# Patient Record
Sex: FEMALE | Race: WHITE | NOT HISPANIC OR LATINO
[De-identification: names, ages, dates, MRNs, and addresses within clinical notes are randomized per-mention and may not be internally consistent; named-entity substitution may affect disease eponyms.]

---

## 2018-12-17 ENCOUNTER — APPOINTMENT (OUTPATIENT)
Dept: ORTHOPEDIC SURGERY | Facility: CLINIC | Age: 68
End: 2018-12-17
Payer: COMMERCIAL

## 2018-12-17 VITALS
WEIGHT: 110 LBS | BODY MASS INDEX: 17.68 KG/M2 | HEIGHT: 66 IN | DIASTOLIC BLOOD PRESSURE: 100 MMHG | SYSTOLIC BLOOD PRESSURE: 178 MMHG | HEART RATE: 79 BPM

## 2018-12-17 DIAGNOSIS — M25.551 PAIN IN RIGHT HIP: ICD-10-CM

## 2018-12-17 DIAGNOSIS — S70.12XA CONTUSION OF LEFT HIP, INITIAL ENCOUNTER: ICD-10-CM

## 2018-12-17 DIAGNOSIS — S70.02XA CONTUSION OF LEFT HIP, INITIAL ENCOUNTER: ICD-10-CM

## 2018-12-17 PROBLEM — Z00.00 ENCOUNTER FOR PREVENTIVE HEALTH EXAMINATION: Noted: 2018-12-17

## 2018-12-17 PROBLEM — Z00.00 ENCOUNTER FOR PREVENTIVE HEALTH EXAMINATION: Status: ACTIVE | Noted: 2018-12-17

## 2018-12-17 PROCEDURE — 73502 X-RAY EXAM HIP UNI 2-3 VIEWS: CPT | Mod: LT

## 2018-12-17 PROCEDURE — 99204 OFFICE O/P NEW MOD 45 MIN: CPT

## 2018-12-18 ENCOUNTER — APPOINTMENT (OUTPATIENT)
Dept: ORTHOPEDIC SURGERY | Facility: CLINIC | Age: 68
End: 2018-12-18

## 2019-05-28 ENCOUNTER — RESULT REVIEW (OUTPATIENT)
Age: 69
End: 2019-05-28

## 2019-10-04 ENCOUNTER — NON-APPOINTMENT (OUTPATIENT)
Age: 69
End: 2019-10-04

## 2019-10-04 ENCOUNTER — APPOINTMENT (OUTPATIENT)
Dept: CARDIOLOGY | Facility: CLINIC | Age: 69
End: 2019-10-04
Payer: COMMERCIAL

## 2019-10-04 VITALS
BODY MASS INDEX: 17.04 KG/M2 | DIASTOLIC BLOOD PRESSURE: 93 MMHG | HEIGHT: 66 IN | SYSTOLIC BLOOD PRESSURE: 154 MMHG | WEIGHT: 106 LBS | RESPIRATION RATE: 15 BRPM | HEART RATE: 68 BPM

## 2019-10-04 PROCEDURE — 99204 OFFICE O/P NEW MOD 45 MIN: CPT

## 2019-10-04 PROCEDURE — 93000 ELECTROCARDIOGRAM COMPLETE: CPT

## 2019-10-11 ENCOUNTER — APPOINTMENT (OUTPATIENT)
Dept: CARDIOLOGY | Facility: CLINIC | Age: 69
End: 2019-10-11
Payer: COMMERCIAL

## 2019-10-11 VITALS
WEIGHT: 108 LBS | HEART RATE: 74 BPM | BODY MASS INDEX: 17.36 KG/M2 | HEIGHT: 66 IN | RESPIRATION RATE: 15 BRPM | SYSTOLIC BLOOD PRESSURE: 150 MMHG | DIASTOLIC BLOOD PRESSURE: 91 MMHG

## 2019-10-11 PROCEDURE — 99214 OFFICE O/P EST MOD 30 MIN: CPT

## 2019-10-11 NOTE — PHYSICAL EXAM
[Well Groomed] : well groomed [Normal Appearance] : normal appearance [General Appearance - Well Developed] : well developed [General Appearance - In No Acute Distress] : no acute distress [General Appearance - Well Nourished] : well nourished [No Deformities] : no deformities [Normal Oropharynx] : normal oropharynx [Normal Conjunctiva] : the conjunctiva exhibited no abnormalities [Normal Oral Mucosa] : normal oral mucosa [Normal Jugular Venous V Waves Present] : normal jugular venous V waves present [Normal Jugular Venous A Waves Present] : normal jugular venous A waves present [Respiration, Rhythm And Depth] : normal respiratory rhythm and effort [No Jugular Venous Fish A Waves] : no jugular venous fish A waves [Exaggerated Use Of Accessory Muscles For Inspiration] : no accessory muscle use [Auscultation Breath Sounds / Voice Sounds] : lungs were clear to auscultation bilaterally [Bowel Sounds] : normal bowel sounds [Abdomen Soft] : soft [Abnormal Walk] : normal gait [Gait - Sufficient For Exercise Testing] : the gait was sufficient for exercise testing [Cyanosis, Localized] : no localized cyanosis [Nail Clubbing] : no clubbing of the fingernails [Skin Color & Pigmentation] : normal skin color and pigmentation [Skin Turgor] : normal skin turgor [] : no rash [Oriented To Time, Place, And Person] : oriented to person, place, and time [Affect] : the affect was normal [Mood] : the mood was normal [No Anxiety] : not feeling anxious [Normal] : normal [No Precordial Heave] : no precordial heave was noted [Normal Rate] : normal [Rhythm Regular] : regular [Normal S1] : normal S1 [Normal S2] : normal S2 [No Gallop] : no gallop heard [II] : a grade 2 [No Abnormalities] : the abdominal aorta was not enlarged and no bruit was heard [2+] : left 2+ [No Pitting Edema] : no pitting edema present [S3] : no S3 [S4] : no S4 [Click] : no click [Pericardial Rub] : no pericardial rub [Right Carotid Bruit] : no bruit heard over the right carotid [Left Carotid Bruit] : no bruit heard over the left carotid [Right Femoral Bruit] : no bruit heard over the right femoral artery [Left Femoral Bruit] : no bruit heard over the left femoral artery

## 2019-10-11 NOTE — REASON FOR VISIT
[Follow-Up - Clinic] : a clinic follow-up of [FreeTextEntry2] : pre-op cataract [FreeTextEntry1] : murmur

## 2019-10-22 RX ORDER — TELMISARTAN 80 MG/1
80 TABLET ORAL
Qty: 90 | Refills: 1 | Status: DISCONTINUED | COMMUNITY
Start: 2019-10-11 | End: 2019-10-22

## 2019-11-12 ENCOUNTER — APPOINTMENT (OUTPATIENT)
Dept: CARDIOLOGY | Facility: CLINIC | Age: 69
End: 2019-11-12
Payer: COMMERCIAL

## 2019-11-12 VITALS
WEIGHT: 109 LBS | SYSTOLIC BLOOD PRESSURE: 150 MMHG | DIASTOLIC BLOOD PRESSURE: 89 MMHG | BODY MASS INDEX: 17.52 KG/M2 | RESPIRATION RATE: 16 BRPM | HEIGHT: 66 IN | HEART RATE: 76 BPM

## 2019-11-12 PROCEDURE — 99213 OFFICE O/P EST LOW 20 MIN: CPT

## 2019-11-12 RX ORDER — TROPICAMIDE 10 MG/ML
1 SOLUTION/ DROPS OPHTHALMIC
Qty: 15 | Refills: 0 | Status: DISCONTINUED | COMMUNITY
Start: 2019-10-08

## 2019-11-12 RX ORDER — DIFLUPREDNATE 0.5 MG/ML
0.05 EMULSION OPHTHALMIC
Qty: 5 | Refills: 0 | Status: DISCONTINUED | COMMUNITY
Start: 2019-10-08

## 2019-11-12 RX ORDER — TELMISARTAN 40 MG/1
40 TABLET ORAL DAILY
Qty: 90 | Refills: 1 | Status: COMPLETED | COMMUNITY
Start: 2019-10-04 | End: 2019-11-12

## 2019-11-12 RX ORDER — OFLOXACIN 3 MG/ML
0.3 SOLUTION/ DROPS OPHTHALMIC
Qty: 5 | Refills: 0 | Status: DISCONTINUED | COMMUNITY
Start: 2019-10-08

## 2019-11-26 ENCOUNTER — APPOINTMENT (OUTPATIENT)
Dept: CARDIOLOGY | Facility: CLINIC | Age: 69
End: 2019-11-26
Payer: COMMERCIAL

## 2019-11-26 VITALS
WEIGHT: 107 LBS | RESPIRATION RATE: 99 BRPM | HEIGHT: 66 IN | SYSTOLIC BLOOD PRESSURE: 130 MMHG | DIASTOLIC BLOOD PRESSURE: 82 MMHG | HEART RATE: 74 BPM | BODY MASS INDEX: 17.19 KG/M2

## 2019-11-26 PROCEDURE — 99213 OFFICE O/P EST LOW 20 MIN: CPT

## 2019-12-03 RX ORDER — BROMFENAC 0.76 MG/ML
0.07 SOLUTION/ DROPS OPHTHALMIC
Qty: 5 | Refills: 0 | Status: COMPLETED | COMMUNITY
Start: 2019-10-08 | End: 2019-12-03

## 2019-12-03 RX ORDER — TIMOLOL MALEATE 5 MG/ML
0.5 SOLUTION OPHTHALMIC
Qty: 5 | Refills: 0 | Status: COMPLETED | COMMUNITY
Start: 2019-10-24 | End: 2019-12-03

## 2020-01-08 ENCOUNTER — APPOINTMENT (OUTPATIENT)
Dept: CARDIOLOGY | Facility: CLINIC | Age: 70
End: 2020-01-08
Payer: COMMERCIAL

## 2020-01-08 ENCOUNTER — LABORATORY RESULT (OUTPATIENT)
Age: 70
End: 2020-01-08

## 2020-01-08 VITALS
HEIGHT: 66 IN | BODY MASS INDEX: 17.36 KG/M2 | RESPIRATION RATE: 16 BRPM | HEART RATE: 70 BPM | WEIGHT: 108 LBS | DIASTOLIC BLOOD PRESSURE: 84 MMHG | SYSTOLIC BLOOD PRESSURE: 130 MMHG

## 2020-01-08 DIAGNOSIS — Z01.810 ENCOUNTER FOR PREPROCEDURAL CARDIOVASCULAR EXAMINATION: ICD-10-CM

## 2020-01-08 DIAGNOSIS — Z87.891 PERSONAL HISTORY OF NICOTINE DEPENDENCE: ICD-10-CM

## 2020-01-08 PROCEDURE — 99214 OFFICE O/P EST MOD 30 MIN: CPT

## 2020-04-26 ENCOUNTER — MESSAGE (OUTPATIENT)
Age: 70
End: 2020-04-26

## 2020-05-06 ENCOUNTER — APPOINTMENT (OUTPATIENT)
Dept: DISASTER EMERGENCY | Facility: CLINIC | Age: 70
End: 2020-05-06

## 2020-05-06 LAB
SARS-COV-2 IGG SERPL IA-ACNC: 0.4 INDEX
SARS-COV-2 IGG SERPL QL IA: NEGATIVE

## 2020-08-12 RX ORDER — TELMISARTAN AND HYDROCHLOROTHIAZIDE 40; 12.5 MG/1; MG/1
40-12.5 TABLET ORAL
Qty: 90 | Refills: 1 | Status: DISCONTINUED | COMMUNITY
Start: 2020-03-13 | End: 2020-08-12

## 2020-09-14 ENCOUNTER — RX RENEWAL (OUTPATIENT)
Age: 70
End: 2020-09-14

## 2020-09-23 ENCOUNTER — APPOINTMENT (OUTPATIENT)
Dept: CARDIOLOGY | Facility: CLINIC | Age: 70
End: 2020-09-23
Payer: COMMERCIAL

## 2020-09-23 ENCOUNTER — NON-APPOINTMENT (OUTPATIENT)
Age: 70
End: 2020-09-23

## 2020-09-23 VITALS
WEIGHT: 107 LBS | RESPIRATION RATE: 15 BRPM | HEART RATE: 69 BPM | BODY MASS INDEX: 17.19 KG/M2 | SYSTOLIC BLOOD PRESSURE: 128 MMHG | HEIGHT: 66 IN | DIASTOLIC BLOOD PRESSURE: 80 MMHG

## 2020-09-23 PROCEDURE — 99213 OFFICE O/P EST LOW 20 MIN: CPT

## 2020-09-23 PROCEDURE — 93000 ELECTROCARDIOGRAM COMPLETE: CPT

## 2020-10-22 ENCOUNTER — LABORATORY RESULT (OUTPATIENT)
Age: 70
End: 2020-10-22

## 2020-10-27 ENCOUNTER — APPOINTMENT (OUTPATIENT)
Dept: CARDIOLOGY | Facility: CLINIC | Age: 70
End: 2020-10-27
Payer: COMMERCIAL

## 2020-10-27 PROCEDURE — 99072 ADDL SUPL MATRL&STAF TM PHE: CPT

## 2020-10-27 PROCEDURE — 78452 HT MUSCLE IMAGE SPECT MULT: CPT

## 2020-10-27 PROCEDURE — A9500: CPT

## 2020-10-27 PROCEDURE — 93015 CV STRESS TEST SUPVJ I&R: CPT

## 2021-04-16 ENCOUNTER — NON-APPOINTMENT (OUTPATIENT)
Age: 71
End: 2021-04-16

## 2021-05-26 ENCOUNTER — APPOINTMENT (OUTPATIENT)
Dept: CARDIOLOGY | Facility: CLINIC | Age: 71
End: 2021-05-26

## 2021-08-18 ENCOUNTER — APPOINTMENT (OUTPATIENT)
Dept: CARDIOLOGY | Facility: CLINIC | Age: 71
End: 2021-08-18
Payer: COMMERCIAL

## 2021-08-18 ENCOUNTER — NON-APPOINTMENT (OUTPATIENT)
Age: 71
End: 2021-08-18

## 2021-08-18 VITALS
TEMPERATURE: 97.8 F | HEIGHT: 66 IN | DIASTOLIC BLOOD PRESSURE: 90 MMHG | SYSTOLIC BLOOD PRESSURE: 160 MMHG | RESPIRATION RATE: 16 BRPM | WEIGHT: 109 LBS | BODY MASS INDEX: 17.52 KG/M2 | OXYGEN SATURATION: 99 % | HEART RATE: 64 BPM

## 2021-08-18 PROCEDURE — 99214 OFFICE O/P EST MOD 30 MIN: CPT | Mod: 25

## 2021-08-18 PROCEDURE — 93000 ELECTROCARDIOGRAM COMPLETE: CPT

## 2021-08-18 NOTE — CARDIOLOGY SUMMARY
[___] : [unfilled] [de-identified] : 8/18/2021 [de-identified] : NUC: 10/27/2020 [de-identified] : 9/25/2019

## 2021-08-18 NOTE — PHYSICAL EXAM
[Well Developed] : well developed [Well Nourished] : well nourished [No Acute Distress] : no acute distress [Normal Venous Pressure] : normal venous pressure [No Carotid Bruit] : no carotid bruit [Normal S1, S2] : normal S1, S2 [No Murmur] : no murmur [No Rub] : no rub [Clear Lung Fields] : clear lung fields [Good Air Entry] : good air entry [No Respiratory Distress] : no respiratory distress  [Soft] : abdomen soft [Non Tender] : non-tender [No Masses/organomegaly] : no masses/organomegaly [Normal Bowel Sounds] : normal bowel sounds [Normal Gait] : normal gait [No Edema] : no edema [No Cyanosis] : no cyanosis [No Clubbing] : no clubbing [No Varicosities] : no varicosities [No Rash] : no rash [No Skin Lesions] : no skin lesions [Moves all extremities] : moves all extremities [No Focal Deficits] : no focal deficits [Normal Speech] : normal speech [Alert and Oriented] : alert and oriented [Normal memory] : normal memory [General Appearance - Well Developed] : well developed [Normal Appearance] : normal appearance [Well Groomed] : well groomed [General Appearance - Well Nourished] : well nourished [No Deformities] : no deformities [General Appearance - In No Acute Distress] : no acute distress [Normal Conjunctiva] : the conjunctiva exhibited no abnormalities [Normal Oral Mucosa] : normal oral mucosa [Normal Oropharynx] : normal oropharynx [Normal Jugular Venous A Waves Present] : normal jugular venous A waves present [Normal Jugular Venous V Waves Present] : normal jugular venous V waves present [No Jugular Venous Fish A Waves] : no jugular venous fish A waves [Respiration, Rhythm And Depth] : normal respiratory rhythm and effort [Exaggerated Use Of Accessory Muscles For Inspiration] : no accessory muscle use [Auscultation Breath Sounds / Voice Sounds] : lungs were clear to auscultation bilaterally [Bowel Sounds] : normal bowel sounds [Abdomen Soft] : soft [Abnormal Walk] : normal gait [Gait - Sufficient For Exercise Testing] : the gait was sufficient for exercise testing [Nail Clubbing] : no clubbing of the fingernails [Cyanosis, Localized] : no localized cyanosis [Skin Color & Pigmentation] : normal skin color and pigmentation [Skin Turgor] : normal skin turgor [] : no rash [Oriented To Time, Place, And Person] : oriented to person, place, and time [Affect] : the affect was normal [Mood] : the mood was normal [No Anxiety] : not feeling anxious [5th Left ICS - MCL] : palpated at the 5th LICS in the midclavicular line [Normal] : normal [No Precordial Heave] : no precordial heave was noted [Normal Rate] : normal [Heart Rate ___] : [unfilled] bpm [Rhythm Regular] : regular [Normal S1] : normal S1 [Normal S2] : normal S2 [No Gallop] : no gallop heard [II] : a grade 2 [2+] : left 2+ [No Abnormalities] : the abdominal aorta was not enlarged and no bruit was heard [No Pitting Edema] : no pitting edema present [S3] : no S3 [S4] : no S4 [Click] : no click [Pericardial Rub] : no pericardial rub [Right Carotid Bruit] : no bruit heard over the right carotid [Left Carotid Bruit] : no bruit heard over the left carotid [Right Femoral Bruit] : no bruit heard over the right femoral artery [Left Femoral Bruit] : no bruit heard over the left femoral artery

## 2021-08-18 NOTE — DISCUSSION/SUMMARY
[FreeTextEntry1] : Dr. Be-(PRIOR VISIT and PMH WITH Dr. Be): \par This is a 70-year-old female past medical history significant for Mohs surgery, s/p cataract surgery who comes in for follow-up cardiac evaluation.  She denies chest pain, shortness of breath, dizziness or syncope.  The patient is still concerned about her abnormal electrocardiogram.\par \par Electrocardiogram done September 23, 2020 demonstrated normal sinus rhythm rate 70 bpm is otherwise remarkable for left atrial abnormality and a Q wave in V1 and V2 with poor R wave progression.\par \par The patient will schedule a nuclear stress test to rule out significant coronary artery disease and to evaluate her coronary artery perfusion.\par \par Her blood pressure is under good control.\par \par The patient has no history of rheumatic fever.  Her cardiac risk factors include a remote history of smoking (he stopped over 35 years ago, and apparently now hypertension.\par \par Electrocardiogram done October 4, 2019 demonstrated normal sinus rhythm rate 67 beats per minute is otherwise remarkable for poor R-wave progression.\par \par  She exercises on a daily basis and has good aerobic capacity.\par \par She will follow-up with me after the nuclear stress test is completed.\par She is encouraged to limit her salt intake.  She will continue on her good aerobic exercise program.

## 2021-08-19 ENCOUNTER — APPOINTMENT (OUTPATIENT)
Dept: CARDIOLOGY | Facility: CLINIC | Age: 71
End: 2021-08-19
Payer: COMMERCIAL

## 2021-08-19 DIAGNOSIS — I10 ESSENTIAL (PRIMARY) HYPERTENSION: ICD-10-CM

## 2021-08-19 PROCEDURE — 93784 AMBL BP MNTR W/SOFTWARE: CPT

## 2021-08-20 PROBLEM — I10 WHITE COAT SYNDROME WITH DIAGNOSIS OF HYPERTENSION: Status: ACTIVE | Noted: 2021-08-18

## 2021-08-23 ENCOUNTER — NON-APPOINTMENT (OUTPATIENT)
Age: 71
End: 2021-08-23

## 2021-12-14 ENCOUNTER — APPOINTMENT (OUTPATIENT)
Dept: CARDIOLOGY | Facility: CLINIC | Age: 71
End: 2021-12-14

## 2022-01-03 ENCOUNTER — TRANSCRIPTION ENCOUNTER (OUTPATIENT)
Age: 72
End: 2022-01-03

## 2022-01-04 ENCOUNTER — EMERGENCY (EMERGENCY)
Facility: HOSPITAL | Age: 72
LOS: 1 days | Discharge: HOME | End: 2022-01-04
Attending: STUDENT IN AN ORGANIZED HEALTH CARE EDUCATION/TRAINING PROGRAM
Payer: COMMERCIAL

## 2022-01-04 VITALS
RESPIRATION RATE: 18 BRPM | WEIGHT: 108.91 LBS | TEMPERATURE: 98 F | DIASTOLIC BLOOD PRESSURE: 106 MMHG | HEIGHT: 66 IN | OXYGEN SATURATION: 98 % | HEART RATE: 114 BPM | SYSTOLIC BLOOD PRESSURE: 192 MMHG

## 2022-01-04 PROCEDURE — 99284 EMERGENCY DEPT VISIT MOD MDM: CPT

## 2022-01-04 PROCEDURE — 70450 CT HEAD/BRAIN W/O DYE: CPT | Mod: 26,MD

## 2022-01-04 PROCEDURE — 90714 TD VACC NO PRESV 7 YRS+ IM: CPT

## 2022-01-04 PROCEDURE — 76377 3D RENDER W/INTRP POSTPROCES: CPT | Mod: 26

## 2022-01-04 PROCEDURE — 99285 EMERGENCY DEPT VISIT HI MDM: CPT | Mod: 25

## 2022-01-04 PROCEDURE — 70486 CT MAXILLOFACIAL W/O DYE: CPT | Mod: MD

## 2022-01-04 PROCEDURE — 70450 CT HEAD/BRAIN W/O DYE: CPT | Mod: MD

## 2022-01-04 PROCEDURE — 76377 3D RENDER W/INTRP POSTPROCES: CPT

## 2022-01-04 PROCEDURE — 70486 CT MAXILLOFACIAL W/O DYE: CPT | Mod: 26,MD

## 2022-01-04 PROCEDURE — 90471 IMMUNIZATION ADMIN: CPT

## 2022-01-04 PROCEDURE — 13132 CMPLX RPR F/C/C/M/N/AX/G/H/F: CPT

## 2022-01-04 RX ORDER — TETANUS AND DIPHTHERIA TOXOIDS ADSORBED 2; 2 [LF]/.5ML; [LF]/.5ML
0.5 INJECTION INTRAMUSCULAR ONCE
Refills: 0 | Status: COMPLETED | OUTPATIENT
Start: 2022-01-04 | End: 2022-01-04

## 2022-01-04 RX ORDER — ACETAMINOPHEN 500 MG
975 TABLET ORAL ONCE
Refills: 0 | Status: COMPLETED | OUTPATIENT
Start: 2022-01-04 | End: 2022-01-04

## 2022-01-04 RX ORDER — CEPHALEXIN 500 MG
1 CAPSULE ORAL
Qty: 8 | Refills: 0
Start: 2022-01-04 | End: 2022-01-07

## 2022-01-04 RX ADMIN — Medication 975 MILLIGRAM(S): at 11:15

## 2022-01-04 RX ADMIN — Medication 975 MILLIGRAM(S): at 12:10

## 2022-01-04 RX ADMIN — TETANUS AND DIPHTHERIA TOXOIDS ADSORBED 0.5 MILLILITER(S): 2; 2 INJECTION INTRAMUSCULAR at 09:45

## 2022-01-04 NOTE — ED PROVIDER NOTE - ATTENDING CONTRIBUTION TO CARE
I, Guillermo Mcclendon, performed a history and physical exam of the patient and discussed their management with the resident and/or advanced care provider. I reviewed the resident and/or advanced care provider's note and agree with the documented findings and plan of care. I was present and available for all procedures.    71F PMH of HTN here after bumping into steel frame at 3AM this morning. Mechanical mechanism, no prodromal sequelae, patient denies falling from impact but says that she sank to floor 2/2 ?pain. Denies postdromal sequelae as well. Denies HA, n/v/ blurry vision, double vision, changes in sensation, dizziness. Did not take any pain meds. Denies other recent trauma, fevers, chills, headache, dizziness, nausea, vomiting, dysuria, freq, hematuria, diarrhea, constipation, chest pain, shortness of breath, cough.      Primary Survey  A - airway intact  B - bilateral breath sounds and good chest rise  C - initially BP: 192/106 , palpable pulses in all extremities  D - GCS 15 on arrival  Exposure obtained    Secondary survey  Gen: NAD  HEENT: NC, C-spine no ttp, right forehead with laceration 3cm, pupils 3mm equal & reactive, eomi, nasal ttp with swelling no nasal septal hematoma, left maxillary ttp without crepitus, periorbital ecchymosis to left eye. no ttp, normal teeth. small upper lip laceration not through and through not crossing vermillion border, left neck ecchymosis without ttp or crepitus trachea midline   CV: pulse regularly present  Pulm: CTA B/L  Chest: intact without ttp  Abd: Soft, ND, NT, no rebound, no guarding  Groin: Normal appearing  Ext: Palp radial b/l, palp DP b/l  Back: no TTP, no palpable runoff, stepoff, or deformity    well appearing concerning for laceration after hs, patient requesting plastics for laceration repair otherwise will eval with cth ct max face, tdap, analgesia, no other signs of traumatic injury or orbital entrapment dispo pending reeval

## 2022-01-04 NOTE — ED PROVIDER NOTE - CARE PROVIDER_API CALL
Calixto Angel)  Plastic Surgery  [address]  [place] [address]  Phone: [phone]  Fax: [phone]  Follow Up Time: 1-3 Days

## 2022-01-04 NOTE — ED PROVIDER NOTE - PHYSICAL EXAMINATION
Gen: WDWN, NAD  HEENT: EOMI, no nasal discharge, mucous membranes moist; (+) R forehead laceration ~ 2-3cm in length from hairline to upper mid forehead vertically; (+) L lip lac < 1cm in length not open, (+) forehead hematoma in b/w eyebrows, (+) raccoon sign on L, no hemotympanum or coughlin sign, no CSF rhinorrhea  CV: RRR, +S1/S2, no M/R/G  Resp: CTAB, no W/R/R  GI: Abdomen soft non-distended, NTTP, no masses  MSK: No open wounds, no bruising, no LE edema  Neuro: CN2-12 grossly intact, A&Ox4, MS +5/5 in UE and LE BL, finger to nose smooth and rapid, gross sensation intact in UE and LE BL, gait smooth and coordinated, negative rhomberg, negative pronator drift   Psych: appropriate mood, denies AH, VH, SI

## 2022-01-04 NOTE — ED PROVIDER NOTE - OBJECTIVE STATEMENT
71F PMH of HTN here after bumping into steel frame at 3AM this morning. Mechicanical mechanism, no prodromal sequelae, patient denies falling from impact but says that she sank to floor 2/2 ?pain. Denies postdromal sequelae as well. Denies HA, n/v/ blurry vision, double vision, changes in sensation, dizziness. Did not take any pain meds.

## 2022-01-04 NOTE — ED ADULT NURSE NOTE - NSIMPLEMENTINTERV_GEN_ALL_ED
Abdomen soft, non-tender, no guarding.
Implemented All Universal Safety Interventions:  [place] to call system. Call bell, personal items and telephone within reach. Instruct patient to call for assistance. Room bathroom lighting operational. Non-slip footwear when patient is off stretcher. Physically safe environment: no spills, clutter or unnecessary equipment. Stretcher in lowest position, wheels locked, appropriate side rails in place.

## 2022-01-04 NOTE — ED PROVIDER NOTE - PROGRESS NOTE DETAILS
Patient requesting plastics to attend to wound. Will call plastics for assistance Sheba AYALA PGY2: Will dc on keflex BID x 4 days as per plastics recs

## 2022-01-04 NOTE — ED ADULT NURSE NOTE - OBJECTIVE STATEMENT
Patient presents s/p fall at home. As per patient, she woke up around 3AM to use the restroom and walked into a metal bar at home cause a laceration to the R side of her forehead/hairline. Patient presents with bruising to the L eye/orbital area, some facial abrasions, and bilateral knee bruising. Patient states discomfort is localized to face. Denies any other injuries, lumbar, cervical or chest pain. PMH: HTN on Losartan daily. Safety parameters in place. Will continue to assess and monitor.

## 2022-01-04 NOTE — ED PROVIDER NOTE - PATIENT PORTAL LINK FT
You can access the FollowMyHealth Patient Portal offered by [facility] by registering at the following website: [email or web address]. By joining [facility]’s FollowMyHealth portal, you will also be able to view your health information using other applications (apps) compatible with our system.

## 2022-01-04 NOTE — ED PROVIDER NOTE - CLINICAL SUMMARY MEDICAL DECISION MAKING FREE TEXT BOX
71F here after head lac. VSS, PE significant for head lac 2-3cm in length, raccoon sign on L, no hemotypanum or CSF rhinorrhe. Will do CT and pain control, plastics consult.

## 2022-01-04 NOTE — ED PROVIDER NOTE - NSFOLLOWUPINSTRUCTIONS_ED_ALL_ED_FT
You were seen today for lacerations that required repair by our plastic surgeons. Please follow up in 2 days at Dr. Angel's office. We have sent antibiotics to your pharmacy. Please take as directed.    Testing done today indicates that your issue is not from an acute emergency, this could still represent a more serious problem. Most commonly, what you are experiencing is likely not something serious and will resolve in a few days, however testing was done today based on the symptoms that you currently have; so if you develop new or worsening symptoms this could be a sign of a problem that was not tested for and means you should come back to the emergency room or see your doctor urgently. You need to follow up with your doctor in the next 48-72 hours. If you develop any new or worsening symptoms you need to return immediately to the emergency department. If you experience any of the following please come right back to the emergency room: severe nausea and vomiting with inability to tolerate eating, severe worsening of your pain, a new fever, new bleeding in stool or vomit, confusion, new numbness or weakness, passing out.

## 2022-01-04 NOTE — ED ADULT NURSE NOTE - CHIEF COMPLAINT QUOTE
Refill requested for:     Furosemide 40 mg   Last filled on:     8/30/17 #90 +3  Last office visit:     7/12/18   Last labs: 7/12/18    Please advise on refills.   
laceration to forehead   denies loc no thinners

## 2022-02-21 ENCOUNTER — APPOINTMENT (OUTPATIENT)
Dept: CARDIOLOGY | Facility: CLINIC | Age: 72
End: 2022-02-21

## 2022-02-21 PROBLEM — I10 ESSENTIAL (PRIMARY) HYPERTENSION: Chronic | Status: ACTIVE | Noted: 2022-01-04

## 2022-04-11 ENCOUNTER — APPOINTMENT (OUTPATIENT)
Dept: CARDIOLOGY | Facility: CLINIC | Age: 72
End: 2022-04-11
Payer: COMMERCIAL

## 2022-04-11 ENCOUNTER — NON-APPOINTMENT (OUTPATIENT)
Age: 72
End: 2022-04-11

## 2022-04-11 VITALS
OXYGEN SATURATION: 99 % | DIASTOLIC BLOOD PRESSURE: 98 MMHG | TEMPERATURE: 97.8 F | WEIGHT: 108 LBS | HEART RATE: 75 BPM | RESPIRATION RATE: 16 BRPM | BODY MASS INDEX: 17.36 KG/M2 | SYSTOLIC BLOOD PRESSURE: 165 MMHG | HEIGHT: 66 IN

## 2022-04-11 PROCEDURE — 93000 ELECTROCARDIOGRAM COMPLETE: CPT

## 2022-04-11 PROCEDURE — 93306 TTE W/DOPPLER COMPLETE: CPT

## 2022-04-11 PROCEDURE — 99214 OFFICE O/P EST MOD 30 MIN: CPT | Mod: 25

## 2022-04-11 NOTE — ASSESSMENT
[FreeTextEntry1] : This is a 71 year year old female here today for follow up cardiac followup evaluation. \par She has a past medical history significant for Mohs surgery, s/p cataract surgery.\par \par Today she is feeling generally well and does not have any complaints at this time. She remains on Telmisartan HCTZ 40/12.5mg PO DAILY. She was supposed to be on Amlodipine 5mg PO DAILY for BP management in addition to her Telmisartan HCTZ from when she had her 24 hour BP cuff done on 8/19/21 which demonstrated stage 1 HTN. She states that she did not like the side effects of the Amlodipine 5mg PO DAILY and stopped it herself. She states if her BP is elevated today then she is willing to rechallenge herself with the increased dose of Telmisartan (she did not like the side effects of the 80mg dose in the past).\par \par -The patient has no history of rheumatic fever.  Her cardiac risk factors include a remote history of smoking (he stopped over 35 years ago, and apparently now hypertension.\par \par BLOOD PRESSURE:\par -BP is elevated in today's visit and patient is on Telmisartan HCTZ 40/12.5mg PO DAILY.\par -We will increase her medication to Telmisartan HCTZ 80/12.5mg PO DAILY and she will follow up in 1 month for BP check.\par \par -I have discussed the importance of maintaining good BP control and reviewed the newest guidelines with the patient while re-enforcing dietary sodium restrictions to no more than 2-3 g daily, DASH diet, life style modifications as well as the goal of maintaining ideal body weight with the patient today. I have advised the patient to avoid the use of over-the-counter medications/ supplements especially NSAIDS.\par \par I have reviewed with Ms. PELAYO that serious health consequences can occur when blood pressure is not well controlled and the need for strict compliance with medication and that optimal control can significantly reduce the risk of cardiovascular disease stroke, heart attack and other organ damage. They verbally expressed understanding of the fore mentioned serious health consequences to me today.\par \par BLOOD WORK:\par -New blood work script was given today, 4/11/22 to evaluate lipid profile, CBC, BMP, hepatic function, A1C and TSH.\par \par CHOLESTEROL CONTROL:\par -Patient will continue the advised  TLC diet and to continue follow-up for treatment of hyperlipidemia and repeat blood testing with diet and exercise. I have discussed different exercises and the importance of maintenance of optimal body weight. The importance of staying within guidelines and recommendations was stressed to the patient today and they acknowledged that they understand this to me verbally.\par \par  -Ms. PELAYO was educated and advised that failure to follow-up with my medical recommendations to lower cholesterol can result in severe health consequences therefore, they will continuing a low saturated and low fat diet and to avoid excessive carbohydrates to help reduce triglycerides and that lowering LDL levels is associated with a significant decrease in serious cardiac events including but not limited to heart attack stroke and overall death. We will continue lipid lowering agents as advised based on blood test results and the patient understands to call if they develop severe muscle discomfort or if they have a reddish tinted discoloration in their urine.\par \par TESTING/REPORTS:\par -EKG done today 04/11/2022 which demonstrated regular sinus rhythm  BPM of 75 otherwise remarkable for left atrial abnormality and a Q wave in V1 and V2 with poor R wave progression.\par \par -EKG done Aug 18, 2021 which demonstrated regular sinus rhythm, BPM 66 of otherwise remarkable for left atrial abnormality and a Q wave in V1 and V2 with poor R wave progression.\par \par Electrocardiogram done September 23, 2020 demonstrated normal sinus rhythm rate 70 bpm is otherwise remarkable for left atrial abnormality and a Q wave in V1 and V2 with poor R wave progression.\par \par -Echocardiogram done in the office 4/11/22 and results are pending. \par \par -Nuclear stress test done on 10/27/2020 demonstrated no evidence of infarction or inducible ischemia .\par \par PLAN:\par -Increase Telmisartan HCTZ 80/12.5mg PO DAILY. \par -Follow up in 1 month for BP check.\par  \par I have discussed the plan of care with Ms. MIGUELITO PELAYO. She is compliant with all of her medications.\par \par The patient understands that aerobic exercises must be increased to minutes 4 times/week and a detailed discussion of lifestyle modification was done today. \par The patient has a good understanding of the diagnosis, treatment plan and lifestyle modification. \par She will contact me at the office for any questions with their care or any changes in their health status.\par \par The patient was seen together with supervision physician Dr. Sujit Be and the plan of care was discussed with the patient and will be carried out as noted above.\par \par Faith PARNELL

## 2022-04-11 NOTE — CARDIOLOGY SUMMARY
[___] : [unfilled] [de-identified] : 4/11/22 [de-identified] : NUC: 10/27/2020 [de-identified] : 9/25/2019

## 2022-04-13 RX ORDER — AMLODIPINE BESYLATE 5 MG/1
5 TABLET ORAL
Qty: 90 | Refills: 1 | Status: DISCONTINUED | COMMUNITY
Start: 2021-08-23 | End: 2022-04-13

## 2022-04-28 ENCOUNTER — NON-APPOINTMENT (OUTPATIENT)
Age: 72
End: 2022-04-28

## 2022-05-24 ENCOUNTER — APPOINTMENT (OUTPATIENT)
Dept: CARDIOLOGY | Facility: CLINIC | Age: 72
End: 2022-05-24

## 2022-07-06 ENCOUNTER — APPOINTMENT (OUTPATIENT)
Dept: CARDIOLOGY | Facility: CLINIC | Age: 72
End: 2022-07-06

## 2022-07-29 ENCOUNTER — APPOINTMENT (OUTPATIENT)
Dept: CARDIOLOGY | Facility: CLINIC | Age: 72
End: 2022-07-29

## 2022-08-05 ENCOUNTER — APPOINTMENT (OUTPATIENT)
Dept: CARDIOLOGY | Facility: CLINIC | Age: 72
End: 2022-08-05

## 2022-08-05 VITALS
WEIGHT: 111 LBS | HEIGHT: 66 IN | HEART RATE: 73 BPM | DIASTOLIC BLOOD PRESSURE: 81 MMHG | BODY MASS INDEX: 17.84 KG/M2 | TEMPERATURE: 98 F | SYSTOLIC BLOOD PRESSURE: 134 MMHG | OXYGEN SATURATION: 99 % | RESPIRATION RATE: 14 BRPM

## 2022-08-05 PROCEDURE — 99214 OFFICE O/P EST MOD 30 MIN: CPT

## 2022-08-05 NOTE — PHYSICAL EXAM
[Well Developed] : well developed [Well Nourished] : well nourished [No Acute Distress] : no acute distress [Normal Venous Pressure] : normal venous pressure [No Carotid Bruit] : no carotid bruit [Normal S1, S2] : normal S1, S2 [No Murmur] : no murmur [No Rub] : no rub [Clear Lung Fields] : clear lung fields [Good Air Entry] : good air entry [No Respiratory Distress] : no respiratory distress  [Soft] : abdomen soft [Non Tender] : non-tender [No Masses/organomegaly] : no masses/organomegaly [Normal Bowel Sounds] : normal bowel sounds [Normal Gait] : normal gait [No Edema] : no edema [No Cyanosis] : no cyanosis [No Clubbing] : no clubbing [No Varicosities] : no varicosities [No Rash] : no rash [No Skin Lesions] : no skin lesions [Moves all extremities] : moves all extremities [No Focal Deficits] : no focal deficits [Normal Speech] : normal speech [Alert and Oriented] : alert and oriented [Normal memory] : normal memory [General Appearance - Well Developed] : well developed [Normal Appearance] : normal appearance [Well Groomed] : well groomed [General Appearance - Well Nourished] : well nourished [No Deformities] : no deformities [General Appearance - In No Acute Distress] : no acute distress [Normal Conjunctiva] : the conjunctiva exhibited no abnormalities [Normal Oral Mucosa] : normal oral mucosa [Normal Oropharynx] : normal oropharynx [Normal Jugular Venous A Waves Present] : normal jugular venous A waves present [Normal Jugular Venous V Waves Present] : normal jugular venous V waves present [No Jugular Venous Fish A Waves] : no jugular venous fish A waves [Respiration, Rhythm And Depth] : normal respiratory rhythm and effort [Exaggerated Use Of Accessory Muscles For Inspiration] : no accessory muscle use [Auscultation Breath Sounds / Voice Sounds] : lungs were clear to auscultation bilaterally [Bowel Sounds] : normal bowel sounds [Abdomen Soft] : soft [Abnormal Walk] : normal gait [Gait - Sufficient For Exercise Testing] : the gait was sufficient for exercise testing [Nail Clubbing] : no clubbing of the fingernails [Cyanosis, Localized] : no localized cyanosis [Skin Color & Pigmentation] : normal skin color and pigmentation [Skin Turgor] : normal skin turgor [] : no rash [Oriented To Time, Place, And Person] : oriented to person, place, and time [Affect] : the affect was normal [Mood] : the mood was normal [No Anxiety] : not feeling anxious [5th Left ICS - MCL] : palpated at the 5th LICS in the midclavicular line [Normal] : normal [No Precordial Heave] : no precordial heave was noted [Normal Rate] : normal [Heart Rate ___] : [unfilled] bpm [Rhythm Regular] : regular [Normal S1] : normal S1 [Normal S2] : normal S2 [No Gallop] : no gallop heard [S3] : no S3 [S4] : no S4 [Click] : no click [Pericardial Rub] : no pericardial rub [II] : a grade 2 [Right Carotid Bruit] : no bruit heard over the right carotid [Left Carotid Bruit] : no bruit heard over the left carotid [Right Femoral Bruit] : no bruit heard over the right femoral artery [Left Femoral Bruit] : no bruit heard over the left femoral artery [2+] : left 2+ [No Abnormalities] : the abdominal aorta was not enlarged and no bruit was heard [No Pitting Edema] : no pitting edema present

## 2022-08-05 NOTE — CARDIOLOGY SUMMARY
[de-identified] : 4/11/22 [de-identified] : NUC: 10/27/2020 [de-identified] : 9/25/2019 [___] : [unfilled]

## 2022-08-05 NOTE — ASSESSMENT
[FreeTextEntry1] : Prior note nurse practitioner Cherelle April 11, 2022::\par \par This is a 71 year year old female here today for follow up cardiac followup evaluation. \par She has a past medical history significant for Mohs surgery, s/p cataract surgery.\par \par Today she is feeling generally well and does not have any complaints at this time. She remains on Telmisartan HCTZ 40/12.5mg PO DAILY. She was supposed to be on Amlodipine 5mg PO DAILY for BP management in addition to her Telmisartan HCTZ from when she had her 24 hour BP cuff done on 8/19/21 which demonstrated stage 1 HTN. She states that she did not like the side effects of the Amlodipine 5mg PO DAILY and stopped it herself. She states if her BP is elevated today then she is willing to rechallenge herself with the increased dose of Telmisartan (she did not like the side effects of the 80mg dose in the past).\par \par -The patient has no history of rheumatic fever.  Her cardiac risk factors include a remote history of smoking (he stopped over 35 years ago, and apparently now hypertension.\par \par BLOOD PRESSURE:\par -BP is elevated in today's visit and patient is on Telmisartan HCTZ 40/12.5mg PO DAILY.\par -We will increase her medication to Telmisartan HCTZ 80/12.5mg PO DAILY and she will follow up in 1 month for BP check.\par \par -I have discussed the importance of maintaining good BP control and reviewed the newest guidelines with the patient while re-enforcing dietary sodium restrictions to no more than 2-3 g daily, DASH diet, life style modifications as well as the goal of maintaining ideal body weight with the patient today. I have advised the patient to avoid the use of over-the-counter medications/ supplements especially NSAIDS.\par \par I have reviewed with Ms. PELAYO that serious health consequences can occur when blood pressure is not well controlled and the need for strict compliance with medication and that optimal control can significantly reduce the risk of cardiovascular disease stroke, heart attack and other organ damage. They verbally expressed understanding of the fore mentioned serious health consequences to me today.\par \par BLOOD WORK:\par -New blood work script was given today, 4/11/22 to evaluate lipid profile, CBC, BMP, hepatic function, A1C and TSH.\par \par CHOLESTEROL CONTROL:\par -Patient will continue the advised  TLC diet and to continue follow-up for treatment of hyperlipidemia and repeat blood testing with diet and exercise. I have discussed different exercises and the importance of maintenance of optimal body weight. The importance of staying within guidelines and recommendations was stressed to the patient today and they acknowledged that they understand this to me verbally.\par \par  -Ms. PELAYO was educated and advised that failure to follow-up with my medical recommendations to lower cholesterol can result in severe health consequences therefore, they will continuing a low saturated and low fat diet and to avoid excessive carbohydrates to help reduce triglycerides and that lowering LDL levels is associated with a significant decrease in serious cardiac events including but not limited to heart attack stroke and overall death. We will continue lipid lowering agents as advised based on blood test results and the patient understands to call if they develop severe muscle discomfort or if they have a reddish tinted discoloration in their urine.\par \par TESTING/REPORTS:\par -EKG done today 04/11/2022 which demonstrated regular sinus rhythm  BPM of 75 otherwise remarkable for left atrial abnormality and a Q wave in V1 and V2 with poor R wave progression.\par \par -EKG done Aug 18, 2021 which demonstrated regular sinus rhythm, BPM 66 of otherwise remarkable for left atrial abnormality and a Q wave in V1 and V2 with poor R wave progression.\par \par Electrocardiogram done September 23, 2020 demonstrated normal sinus rhythm rate 70 bpm is otherwise remarkable for left atrial abnormality and a Q wave in V1 and V2 with poor R wave progression.\par \par -Echocardiogram done in the office 4/11/22 and results are pending. \par \par -Nuclear stress test done on 10/27/2020 demonstrated no evidence of infarction or inducible ischemia .\par \par PLAN:\par -Increase Telmisartan HCTZ 80/12.5mg PO DAILY. \par -Follow up in 1 month for BP check.\par  \par I have discussed the plan of care with Ms. MIGUELITO PELAYO. She is compliant with all of her medications.\par \par The patient understands that aerobic exercises must be increased to minutes 4 times/week and a detailed discussion of lifestyle modification was done today. \par The patient has a good understanding of the diagnosis, treatment plan and lifestyle modification. \par She will contact me at the office for any questions with their care or any changes in their health status.\par \par The patient was seen together with supervision physician Dr. Sujit Be and the plan of care was discussed with the patient and will be carried out as noted above.\par \par Faith PARNELL

## 2022-08-05 NOTE — DISCUSSION/SUMMARY
[FreeTextEntry1] : This is a 71-year-old female past medical history significant for hypertension, mitral valve regurgitation, Mohs surgery, s/p cataract surgery who comes in for follow-up cardiac evaluation.  She denies chest pain, shortness of breath, dizziness or syncope.\par The patient has no history of rheumatic fever.  Her cardiac risk factors include a remote history of smoking (he stopped over 35 years ago, and apparently now hypertension.\par The patient is back on her Micardis hydrochlorothiazide 80/12.5 mg/day for better control of her blood pressure.\par Her blood pressure today is adequate.  I have asked her to return in 1 month to reevaluate her blood pressure.  (134 of 81 in the left arm, 131/80 in the right arm).\par Echo Doppler examination done April 11, 2022 demonstrated mild mitral mild tricuspid valve regurgitation, thickened and mildly calcified aortic valve leaflets with normal opening and normal ejection fraction of 65%.\par She will be going for new blood work in the next month.  She will get me a copy of those results for my review.\par Electrocardiogram done April 11, 2022 demonstrated normal sinus rhythm rate 75 bpm is otherwise remarkable for left atrial abnormality and poor R wave progression.\par Electrocardiogram done September 23, 2020 demonstrated normal sinus rhythm rate 70 bpm is otherwise remarkable for left atrial abnormality and a Q wave in V1 and V2 with poor R wave progression.\par The patient had a normal nuclear stress test October 27, 2020 with an estimated ejection fraction of 79%.\par Electrocardiogram done October 4, 2019 demonstrated normal sinus rhythm rate 67 beats per minute is otherwise remarkable for poor R-wave progression.\par She exercises on a daily basis and has good aerobic capacity.\par \par She is encouraged to limit her salt intake.  She will continue on her good aerobic exercise program.\par The patient understands that aerobic exercises must be increased to 40 minutes 4 times per week. A detailed discussion of lifestyle modification was done today. The patient has a good understanding of the diagnosis, and treatment plan. Lifestyle modification was also outlined.

## 2022-08-12 ENCOUNTER — RX RENEWAL (OUTPATIENT)
Age: 72
End: 2022-08-12

## 2022-09-13 ENCOUNTER — APPOINTMENT (OUTPATIENT)
Dept: CARDIOLOGY | Facility: CLINIC | Age: 72
End: 2022-09-13

## 2022-09-19 ENCOUNTER — NON-APPOINTMENT (OUTPATIENT)
Age: 72
End: 2022-09-19

## 2022-09-19 ENCOUNTER — APPOINTMENT (OUTPATIENT)
Dept: ORTHOPEDIC SURGERY | Facility: CLINIC | Age: 72
End: 2022-09-19

## 2022-09-19 VITALS — WEIGHT: 110 LBS | BODY MASS INDEX: 17.68 KG/M2 | HEIGHT: 66 IN

## 2022-09-19 DIAGNOSIS — S99.922A UNSPECIFIED INJURY OF LEFT FOOT, INITIAL ENCOUNTER: ICD-10-CM

## 2022-09-19 DIAGNOSIS — S93.619A: ICD-10-CM

## 2022-09-19 PROCEDURE — 73610 X-RAY EXAM OF ANKLE: CPT | Mod: LT

## 2022-09-19 PROCEDURE — 73630 X-RAY EXAM OF FOOT: CPT | Mod: LT

## 2022-09-19 PROCEDURE — 99204 OFFICE O/P NEW MOD 45 MIN: CPT

## 2022-09-19 NOTE — PHYSICAL EXAM
[de-identified] : General: No acute distress\par Mental: Alert and oriented x3\par Eyes: Conjunctivitis not seen\par Chest: Symmetric chest rise, no audible wheezing\par Skin: Bilateral lower extremities absent from rashes and ulcers\par Abdomen: No distention\par Exam of the left lower extremity demonstrates soft tissue swelling over the dorsal aspect of the midfoot laterally, tender along the calcaneocuboid joint and along the dorsal aspect of the proximal fourth and fifth metatarsals.  Nontender medial and lateral malleoli.  Full strength with resisted ankle dorsiflexion and plantarflexion, full motion.  Resisted inversion and eversion does not reproduce pain.  Fires EHL and FHL without pain, flexion and extension of lesser toes with resistance does not reproduce pain.  Nontender at the Achilles tendon insertion.  Negative anterior drawer and posterior drawer of the ankle, negative syndesmotic squeeze, no pain with medial to lateral compression of the midfoot. [de-identified] : X-rays of the left foot and ankle performed today show some disuse osteopenia, otherwise normal midfoot and hindfoot alignment, no fractures or dislocations.

## 2022-09-19 NOTE — DISCUSSION/SUMMARY
[de-identified] : 72-year-old female here with left dorsal midfoot pain and swelling, likely from dorsal ligament sprain.  Also possible nondisplaced fracture not visualized on x-ray.  We will obtain an MRI for further evaluation.  Cam walker boot was also for increased ability and pain control when ambulating.  She may weightbearing as tolerated on left lower extremity.  She may take anti-inflammatories or Tylenol as needed.  Follow-up after the MRI.

## 2022-09-19 NOTE — HISTORY OF PRESENT ILLNESS
[de-identified] : 72-year-old female here with left foot pain over the past 3 weeks.  She was walking down the steps in her house when she twisted her left foot and felt some pain on the lateral aspect of her foot.  She developed swelling and pain over the top of the foot.  The pain is worse with increased ambulation and going on stairs, improves with rest.  She has been trying to wear a sneaker for support.  The swelling has been persistent and always improves in the morning.  She has never had any issues with her foot and ankle in the past.  She denies any other sites of pain.  She denies any distal numbness or tingling.

## 2022-09-21 ENCOUNTER — OUTPATIENT (OUTPATIENT)
Dept: OUTPATIENT SERVICES | Facility: HOSPITAL | Age: 72
LOS: 1 days | End: 2022-09-21
Payer: COMMERCIAL

## 2022-09-21 ENCOUNTER — RESULT REVIEW (OUTPATIENT)
Age: 72
End: 2022-09-21

## 2022-09-21 ENCOUNTER — APPOINTMENT (OUTPATIENT)
Dept: MRI IMAGING | Facility: CLINIC | Age: 72
End: 2022-09-21

## 2022-09-21 DIAGNOSIS — Z00.00 ENCOUNTER FOR GENERAL ADULT MEDICAL EXAMINATION WITHOUT ABNORMAL FINDINGS: ICD-10-CM

## 2022-09-21 PROCEDURE — 73718 MRI LOWER EXTREMITY W/O DYE: CPT

## 2022-09-21 PROCEDURE — 73718 MRI LOWER EXTREMITY W/O DYE: CPT | Mod: 26,LT

## 2022-09-22 ENCOUNTER — APPOINTMENT (OUTPATIENT)
Dept: ORTHOPEDIC SURGERY | Facility: CLINIC | Age: 72
End: 2022-09-22

## 2022-09-22 PROCEDURE — 99442: CPT

## 2022-10-12 ENCOUNTER — APPOINTMENT (OUTPATIENT)
Dept: ORTHOPEDIC SURGERY | Facility: CLINIC | Age: 72
End: 2022-10-12
Payer: COMMERCIAL

## 2022-10-12 VITALS — BODY MASS INDEX: 17.68 KG/M2 | WEIGHT: 110 LBS | HEIGHT: 66 IN

## 2022-10-12 PROCEDURE — 73630 X-RAY EXAM OF FOOT: CPT | Mod: LT

## 2022-10-12 PROCEDURE — 99213 OFFICE O/P EST LOW 20 MIN: CPT

## 2022-10-12 NOTE — DISCUSSION/SUMMARY
[de-identified] : 72-year-old female here for follow-up of left anterior calcaneus fracture being treated nonoperatively.  It has been approximately 6 weeks since the injury and she may begin to wean out of the boot and into sneakers.  Discussed with her that this should be a gradual transition as she gets used to wearing sneakers again.  She may continue to elevate and ice for swelling.  Follow-up in 4 weeks for reevaluation with repeat x-rays of the left foot.

## 2022-10-12 NOTE — PHYSICAL EXAM
[de-identified] : General: No acute distress\par Mental: Alert and oriented x3\par Eyes: Conjunctivitis not seen\par Chest: Symmetric chest rise, no audible wheezing\par Skin: Bilateral lower extremities absent from rashes and ulcers\par Abdomen: No distention\par \par Right foot and ankle show skin is intact, no deformity, no pain.  Painless range of motion of the ankle and foot.\par Left lower extremity: Skin is intact, significant improvement in swelling compared to last visit.  No ecchymosis.  Slightly tender over the lateral midfoot, otherwise nontender throughout the foot.  Plantarflexion 30, dorsiflexion 10 degrees past neutral.  Good strength with resisted inversion and eversion.  Sensation intact light touch in all distributions.  Fires EHL/FHL, brisk capillary refill.  Nontender compression of the calcaneal body.  [de-identified] : Left foot x-rays today demonstrate no obvious fracture lines, overall maintained alignment and joint space.

## 2022-10-12 NOTE — HISTORY OF PRESENT ILLNESS
[de-identified] : 73-year-old female here for follow-up of left foot injury sustained in late August.  We obtained an MRI which demonstrated a nondisplaced fracture of the anterior calcaneus involving the calcaneocuboid joint.  She has been weightbearing as tolerated in a tall walker boot.  Reports pain is gradually decreasing along with the swelling.  She has been elevating and icing.  She has been weightbearing as tolerated as instructed and occasionally wearing sneakers.  She has been taking Tylenol as needed.

## 2022-11-09 ENCOUNTER — APPOINTMENT (OUTPATIENT)
Dept: ORTHOPEDIC SURGERY | Facility: CLINIC | Age: 72
End: 2022-11-09

## 2022-11-18 ENCOUNTER — APPOINTMENT (OUTPATIENT)
Dept: CARDIOLOGY | Facility: CLINIC | Age: 72
End: 2022-11-18

## 2022-12-05 ENCOUNTER — APPOINTMENT (OUTPATIENT)
Dept: CARDIOLOGY | Facility: CLINIC | Age: 72
End: 2022-12-05

## 2022-12-05 ENCOUNTER — NON-APPOINTMENT (OUTPATIENT)
Age: 72
End: 2022-12-05

## 2022-12-05 VITALS
HEIGHT: 66 IN | DIASTOLIC BLOOD PRESSURE: 78 MMHG | TEMPERATURE: 97.8 F | OXYGEN SATURATION: 98 % | SYSTOLIC BLOOD PRESSURE: 138 MMHG | RESPIRATION RATE: 16 BRPM | WEIGHT: 110 LBS | BODY MASS INDEX: 17.68 KG/M2 | HEART RATE: 69 BPM

## 2022-12-05 DIAGNOSIS — R94.31 ABNORMAL ELECTROCARDIOGRAM [ECG] [EKG]: ICD-10-CM

## 2022-12-05 PROCEDURE — 93000 ELECTROCARDIOGRAM COMPLETE: CPT

## 2022-12-05 PROCEDURE — 99214 OFFICE O/P EST MOD 30 MIN: CPT | Mod: 25

## 2022-12-05 NOTE — DISCUSSION/SUMMARY
[FreeTextEntry1] : Dr. Be-(PRIOR VISIT and PMH WITH Dr. Be): \par This is a 71-year-old female past medical history significant for hypertension, mitral valve regurgitation, Mohs surgery, s/p cataract surgery who comes in for follow-up cardiac evaluation.  She denies chest pain, shortness of breath, dizziness or syncope.\par \par The patient has no history of rheumatic fever.  Her cardiac risk factors include a remote history of smoking (he stopped over 35 years ago, and apparently now hypertension.\par \par The patient is back on her Micardis hydrochlorothiazide 80/12.5 mg/day for better control of her blood pressure. Her blood pressure today is adequate.  I have asked her to return in 1 month to reevaluate her blood pressure.  (134 of 81 in the left arm, 131/80 in the right arm).\par \par Echo Doppler examination done April 11, 2022 demonstrated mild mitral mild tricuspid valve regurgitation, thickened and mildly calcified aortic valve leaflets with normal opening and normal ejection fraction of 65%.\par \par Electrocardiogram done April 11, 2022 demonstrated normal sinus rhythm rate 75 bpm is otherwise remarkable for left atrial abnormality and poor R wave progression.\par \par Electrocardiogram done September 23, 2020 demonstrated normal sinus rhythm rate 70 bpm is otherwise remarkable for left atrial abnormality and a Q wave in V1 and V2 with poor R wave progression.\par \par The patient had a normal nuclear stress test October 27, 2020 with an estimated ejection fraction of 79%.\par \par Electrocardiogram done October 4, 2019 demonstrated normal sinus rhythm rate 67 beats per minute is otherwise remarkable for poor R-wave progression.\par \par The patient understands that aerobic exercises must be increased to 40 minutes 4 times per week. A detailed discussion of lifestyle modification was done today. The patient has a good understanding of the diagnosis, and treatment plan. Lifestyle modification was also outlined.

## 2022-12-05 NOTE — CARDIOLOGY SUMMARY
[___] : [unfilled] [de-identified] : 4/11/22 [de-identified] : NUC: 10/27/2020 [de-identified] : 9/25/2019

## 2022-12-05 NOTE — ASSESSMENT
[FreeTextEntry1] : This is a 72 year year old female here today for follow up cardiac followup evaluation. \par She has a past medical history significant for Mohs surgery, s/p cataract surgery.\par \par Today she is feeling generally well and does not have any complaints at this time. She remains on Telmisartan HCTZ 80/12.5mg PO DAILY. \par \par PMH:\par She was supposed to be on Amlodipine 5mg PO DAILY for BP management in addition to her Telmisartan HCTZ from when she had her 24 hour BP cuff done on 8/19/21 which demonstrated stage 1 HTN. She states that she did not like the side effects of the Amlodipine 5mg PO DAILY and stopped it herself. She states if her BP is elevated today then she is willing to rechallenge herself with the increased dose of Telmisartan (she did not like the side effects of the 80mg dose in the past).\par \par -The patient has no history of rheumatic fever.  Her cardiac risk factors include a remote history of smoking (he stopped over 35 years ago, and apparently now hypertension.\par \par BLOOD PRESSURE:\par -BP is controlled on today's exam. \par \par -I have discussed the importance of maintaining good BP control and reviewed the newest guidelines with the patient while re-enforcing dietary sodium restrictions to no more than 2-3 g daily, DASH diet, life style modifications as well as the goal of maintaining ideal body weight with the patient today. I have advised the patient to avoid the use of over-the-counter medications/ supplements especially NSAIDS.\par \par I have reviewed with Ms. PELAYO that serious health consequences can occur when blood pressure is not well controlled and the need for strict compliance with medication and that optimal control can significantly reduce the risk of cardiovascular disease stroke, heart attack and other organ damage. They verbally expressed understanding of the fore mentioned serious health consequences to me today.\par \par BLOOD WORK:\par -Blood work script given to the patient to have blood work done prior to her next follow up appointment. \par \par CHOLESTEROL CONTROL:\par -Patient will continue the advised  TLC diet and to continue follow-up for treatment of hyperlipidemia and repeat blood testing with diet and exercise. I have discussed different exercises and the importance of maintenance of optimal body weight. The importance of staying within guidelines and recommendations was stressed to the patient today and they acknowledged that they understand this to me verbally.\par \par  -Ms. PELAYO was educated and advised that failure to follow-up with my medical recommendations to lower cholesterol can result in severe health consequences therefore, they will continuing a low saturated and low fat diet and to avoid excessive carbohydrates to help reduce triglycerides and that lowering LDL levels is associated with a significant decrease in serious cardiac events including but not limited to heart attack stroke and overall death. We will continue lipid lowering agents as advised based on blood test results and the patient understands to call if they develop severe muscle discomfort or if they have a reddish tinted discoloration in their urine.\par \par TESTING/REPORTS:\par -EKG done 12/05/2022 which demonstrated regular sinus rhythm with nonspecific ST-T wave changes BPM of 69.\par \par -Echocardiogram done April 11, 2022 demonstrated mild mitral and tricuspid regurgitation with mildly thickened and calcified aortic valve with normal left ventricular systolic function ejection fraction 65%.\par \par -EKG done  04/11/2022 which demonstrated regular sinus rhythm  BPM of 75 otherwise remarkable for left atrial abnormality and a Q wave in V1 and V2 with poor R wave progression.\par \par -EKG done Aug 18, 2021 which demonstrated regular sinus rhythm, BPM 66 of otherwise remarkable for left atrial abnormality and a Q wave in V1 and V2 with poor R wave progression.\par \par -24 hour BP cuff done on 8/19/21 which demonstrated stage 1 HTN.\par \par -Electrocardiogram done September 23, 2020 demonstrated normal sinus rhythm rate 70 bpm is otherwise remarkable for left atrial abnormality and a Q wave in V1 and V2 with poor R wave progression.\par \par -Nuclear stress test done on 10/27/2020 demonstrated no evidence of infarction or inducible ischemia .\par \par PLAN:\par -She will continue with her current medications and will contact the office if she is having any complaints between now and their next follow up appointment.\par -Blood work script given to the patient to have blood work done prior to her next follow up appointment. She understands she needs to get blood work done since her last BW was done in 2020.\par -She will follow up in 3 months.\par  \par I have discussed the plan of care with Ms. MIGUELITO PELAYO. She is compliant with all of her medications.\par \par The patient understands that aerobic exercises must be increased to minutes 4 times/week and a detailed discussion of lifestyle modification was done today. \par The patient has a good understanding of the diagnosis, treatment plan and lifestyle modification. \par She will contact me at the office for any questions with their care or any changes in their health status.\par \par The patient was seen together with supervision physician Dr. Sujit Be and the plan of care was discussed with the patient and will be carried out as noted above.\par \par Faith PARNELL

## 2022-12-14 ENCOUNTER — APPOINTMENT (OUTPATIENT)
Dept: ORTHOPEDIC SURGERY | Facility: CLINIC | Age: 72
End: 2022-12-14

## 2022-12-14 VITALS — HEIGHT: 66 IN | BODY MASS INDEX: 17.68 KG/M2 | WEIGHT: 110 LBS

## 2022-12-14 DIAGNOSIS — S92.009A UNSPECIFIED FRACTURE OF UNSPECIFIED CALCANEUS, INITIAL ENCOUNTER FOR CLOSED FRACTURE: ICD-10-CM

## 2022-12-14 PROCEDURE — 99213 OFFICE O/P EST LOW 20 MIN: CPT

## 2022-12-14 NOTE — HISTORY OF PRESENT ILLNESS
[de-identified] : 72-year-old female presents for follow-up of left anterior calcaneus fracture sustained in August.  Over the past month she has had significant improvement in the pain, denies any swelling or bruising.  She stopped wearing the walking boot and has been wearing regular shoes without difficulty.  She is not having pain radiating proximal or distal, and denies any numbness or tingling.  She has not tried wearing heels or flat shoes.

## 2022-12-14 NOTE — PHYSICAL EXAM
[de-identified] : General: No acute distress\par Mental: Alert and oriented x3\par Eyes: Conjunctivitis not seen\par Chest: Symmetric chest rise, no audible wheezing\par Skin: Bilateral lower extremities absent from rashes and ulcers\par Abdomen: No distention\par \par Left ankle: Skin intact, no ecchymosis, no swelling.  Ankle plantarflexion 30, dorsiflexion 10 degrees.  No change in ankle dorsiflexion with the knee flexed or extended.  Nontender throughout the foot and ankle.  5/5 strength with plantarflexion, dorsiflexion, inversion, eversion.  Motor and sensory intact.

## 2022-12-14 NOTE — DISCUSSION/SUMMARY
[de-identified] : 72-year-old female with left anterior calcaneus fracture.  She is clinically healed, and has no pain.  She may continue all activities as tolerated.  Recommended gentle stretching exercises to resolve any ankle stiffness.  Follow-up as needed.

## 2023-03-17 RX ORDER — TELMISARTAN AND HYDROCHLOROTHIAZIDE 80; 12.5 MG/1; MG/1
80-12.5 TABLET ORAL DAILY
Qty: 30 | Refills: 0 | Status: DISCONTINUED | COMMUNITY
Start: 2019-11-12 | End: 2023-03-17

## 2023-06-06 ENCOUNTER — APPOINTMENT (OUTPATIENT)
Dept: CARDIOLOGY | Facility: CLINIC | Age: 73
End: 2023-06-06

## 2023-09-05 ENCOUNTER — RX RENEWAL (OUTPATIENT)
Age: 73
End: 2023-09-05

## 2023-09-07 ENCOUNTER — RX RENEWAL (OUTPATIENT)
Age: 73
End: 2023-09-07

## 2023-09-19 ENCOUNTER — APPOINTMENT (OUTPATIENT)
Dept: CARDIOLOGY | Facility: CLINIC | Age: 73
End: 2023-09-19

## 2023-09-25 ENCOUNTER — RX RENEWAL (OUTPATIENT)
Age: 73
End: 2023-09-25

## 2023-11-02 ENCOUNTER — OFFICE (OUTPATIENT)
Dept: URBAN - METROPOLITAN AREA CLINIC 35 | Facility: CLINIC | Age: 73
Setting detail: OPHTHALMOLOGY
End: 2023-11-02
Payer: COMMERCIAL

## 2023-11-02 DIAGNOSIS — H16.223: ICD-10-CM

## 2023-11-02 DIAGNOSIS — Z96.1: ICD-10-CM

## 2023-11-02 DIAGNOSIS — H01.001: ICD-10-CM

## 2023-11-02 DIAGNOSIS — H35.363: ICD-10-CM

## 2023-11-02 DIAGNOSIS — H35.373: ICD-10-CM

## 2023-11-02 DIAGNOSIS — H01.004: ICD-10-CM

## 2023-11-02 DIAGNOSIS — H26.493: ICD-10-CM

## 2023-11-02 DIAGNOSIS — C44.329: ICD-10-CM

## 2023-11-02 PROCEDURE — 92134 CPTRZ OPH DX IMG PST SGM RTA: CPT | Performed by: OPHTHALMOLOGY

## 2023-11-02 PROCEDURE — 83861 MICROFLUID ANALY TEARS: CPT | Performed by: OPHTHALMOLOGY

## 2023-11-02 PROCEDURE — 83861 MICROFLUID ANALY TEARS: CPT | Mod: LT | Performed by: OPHTHALMOLOGY

## 2023-11-02 PROCEDURE — 92014 COMPRE OPH EXAM EST PT 1/>: CPT | Performed by: OPHTHALMOLOGY

## 2023-11-02 ASSESSMENT — REFRACTION_AUTOREFRACTION
OS_AXIS: 106
OD_AXIS: 064
OS_CYLINDER: -1.00
OD_CYLINDER: -2.50
OS_SPHERE: +0.50
OD_SPHERE: +1.50

## 2023-11-02 ASSESSMENT — REFRACTION_CURRENTRX
OS_VPRISM_DIRECTION: SV
OD_VPRISM_DIRECTION: SV
OS_OVR_VA: 20/
OS_OVR_VA: 20/
OD_OVR_VA: 20/
OS_SPHERE: +1.75
OS_OVR_VA: 20/
OS_AXIS: 097
OS_SPHERE: +2.00
OD_SPHERE: +1.75
OS_VPRISM_DIRECTION: SV
OD_OVR_VA: 20/
OS_CYLINDER: -2.25
OD_SPHERE: +2.00
OS_VPRISM_DIRECTION: SV
OD_VPRISM_DIRECTION: SV
OD_OVR_VA: 20/
OD_SPHERE: +1.75
OD_VPRISM_DIRECTION: SV
OS_SPHERE: +2.75

## 2023-11-02 ASSESSMENT — SPHEQUIV_DERIVED
OS_SPHEQUIV: 0.25
OD_SPHEQUIV: 0.125
OD_SPHEQUIV: 0.625
OS_SPHEQUIV: 0
OS_SPHEQUIV: 0
OD_SPHEQUIV: 0.25
OS_SPHEQUIV: 0
OS_SPHEQUIV: -0.375
OD_SPHEQUIV: 0.25
OD_SPHEQUIV: 0

## 2023-11-02 ASSESSMENT — REFRACTION_MANIFEST
OD_VA1: 20/25-3
OS_AXIS: 090
OS_SPHERE: +0.75
OD_VA1: 20/25-3
OS_AXIS: 105
OD_CYLINDER: -2.50
OS_VA1: 20/20
OS_CYLINDER: -1.00
OD_SPHERE: +1.50
OS_AXIS: 105
OS_CYLINDER: -1.25
OD_CYLINDER: -1.75
OS_AXIS: 090
OD_CYLINDER: -1.00
OD_AXIS: 065
OD_AXIS: 070
OD_SPHERE: +0.50
OS_CYLINDER: -1.00
OS_VA1: 20/25-2
OS_VA1: 20/25+1
OD_SPHERE: +0.50
OS_CYLINDER: -1.00
OD_CYLINDER: -0.75
OD_VA1: 20/20
OS_VA1: 20/30+
OS_SPHERE: +0.50
OD_AXIS: 070
OD_AXIS: 075
OS_SPHERE: +0.50
OD_VA1: 20/40-2
OS_SPHERE: +0.25
OD_SPHERE: +1.50

## 2023-11-02 ASSESSMENT — SUPERFICIAL PUNCTATE KERATITIS (SPK)
OD_SPK: T
OS_SPK: T

## 2023-11-02 ASSESSMENT — LID EXAM ASSESSMENTS
OS_COMMENTS: CRUSTING ON LIDS
OD_COMMENTS: CRUSTING ON LIDS
OS_BLEPHARITIS: LUL T
OD_BLEPHARITIS: RUL T

## 2023-11-02 ASSESSMENT — CONFRONTATIONAL VISUAL FIELD TEST (CVF)
OD_FINDINGS: FULL
OS_FINDINGS: FULL

## 2023-11-03 ENCOUNTER — RX RENEWAL (OUTPATIENT)
Age: 73
End: 2023-11-03

## 2023-11-22 ENCOUNTER — RX ONLY (RX ONLY)
Age: 73
End: 2023-11-22

## 2023-11-22 ENCOUNTER — OFFICE (OUTPATIENT)
Dept: URBAN - METROPOLITAN AREA CLINIC 35 | Facility: CLINIC | Age: 73
Setting detail: OPHTHALMOLOGY
End: 2023-11-22
Payer: COMMERCIAL

## 2023-11-22 DIAGNOSIS — H26.493: ICD-10-CM

## 2023-11-22 DIAGNOSIS — C44.329: ICD-10-CM

## 2023-11-22 DIAGNOSIS — H40.041: ICD-10-CM

## 2023-11-22 DIAGNOSIS — Z96.1: ICD-10-CM

## 2023-11-22 DIAGNOSIS — H16.223: ICD-10-CM

## 2023-11-22 DIAGNOSIS — H40.053: ICD-10-CM

## 2023-11-22 DIAGNOSIS — H40.013: ICD-10-CM

## 2023-11-22 DIAGNOSIS — H11.823: ICD-10-CM

## 2023-11-22 DIAGNOSIS — H01.001: ICD-10-CM

## 2023-11-22 DIAGNOSIS — H01.004: ICD-10-CM

## 2023-11-22 PROBLEM — H35.373 EPIRETINAL MEMBRANE; BOTH EYES: Status: ACTIVE | Noted: 2023-11-02

## 2023-11-22 PROBLEM — H52.7 REFRACTIVE ERROR: Status: ACTIVE | Noted: 2023-11-02

## 2023-11-22 PROBLEM — H35.363 DRUSEN; BOTH EYES: Status: ACTIVE | Noted: 2023-11-02

## 2023-11-22 PROCEDURE — 99213 OFFICE O/P EST LOW 20 MIN: CPT | Performed by: OPHTHALMOLOGY

## 2023-11-22 ASSESSMENT — REFRACTION_MANIFEST
OS_SPHERE: +0.75
OD_SPHERE: +0.50
OS_AXIS: 105
OD_CYLINDER: -0.75
OD_AXIS: 075
OD_SPHERE: +0.50
OD_VA1: 20/40-2
OD_AXIS: 065
OS_CYLINDER: -1.00
OS_VA1: 20/25-2
OD_VA1: 20/20
OS_SPHERE: +0.25
OD_VA1: 20/25-3
OS_CYLINDER: -1.00
OS_SPHERE: +0.50
OS_VA1: 20/25+1
OD_CYLINDER: -2.50
OS_VA1: 20/20
OS_AXIS: 090
OS_AXIS: 090
OD_AXIS: 070
OS_AXIS: 105
OD_VA1: 20/25-3
OS_VA1: 20/30+
OD_CYLINDER: -1.75
OS_CYLINDER: -1.00
OD_SPHERE: +1.50
OS_SPHERE: +0.50
OS_CYLINDER: -1.25
OD_AXIS: 070
OD_SPHERE: +1.50
OD_CYLINDER: -1.00

## 2023-11-22 ASSESSMENT — LID EXAM ASSESSMENTS
OS_COMMENTS: CRUSTING ON LIDS
OS_BLEPHARITIS: LUL T
OD_BLEPHARITIS: RUL T
OD_COMMENTS: CRUSTING ON LIDS

## 2023-11-22 ASSESSMENT — REFRACTION_CURRENTRX
OS_OVR_VA: 20/
OD_SPHERE: +2.00
OS_VPRISM_DIRECTION: SV
OS_SPHERE: +2.75
OS_OVR_VA: 20/
OS_OVR_VA: 20/
OS_SPHERE: +1.75
OS_AXIS: 097
OD_VPRISM_DIRECTION: SV
OD_OVR_VA: 20/
OS_VPRISM_DIRECTION: SV
OD_OVR_VA: 20/
OD_VPRISM_DIRECTION: SV
OD_OVR_VA: 20/
OS_VPRISM_DIRECTION: SV
OS_CYLINDER: -2.25
OS_SPHERE: +2.00
OD_SPHERE: +1.75
OD_SPHERE: +1.75
OD_VPRISM_DIRECTION: SV

## 2023-11-22 ASSESSMENT — SPHEQUIV_DERIVED
OS_SPHEQUIV: -0.375
OD_SPHEQUIV: 0.25
OD_SPHEQUIV: 0.125
OS_SPHEQUIV: 0
OD_SPHEQUIV: 0
OS_SPHEQUIV: 0.25
OS_SPHEQUIV: 0
OD_SPHEQUIV: 0.625

## 2023-11-22 ASSESSMENT — SUPERFICIAL PUNCTATE KERATITIS (SPK)
OS_SPK: T
OD_SPK: T

## 2023-11-22 ASSESSMENT — CONFRONTATIONAL VISUAL FIELD TEST (CVF)
OS_FINDINGS: FULL
OD_FINDINGS: FULL

## 2024-01-01 ENCOUNTER — NON-APPOINTMENT (OUTPATIENT)
Age: 74
End: 2024-01-01

## 2024-02-06 ENCOUNTER — APPOINTMENT (OUTPATIENT)
Dept: CARDIOLOGY | Facility: CLINIC | Age: 74
End: 2024-02-06

## 2024-02-14 ENCOUNTER — RX RENEWAL (OUTPATIENT)
Age: 74
End: 2024-02-14

## 2024-02-29 ENCOUNTER — LABORATORY RESULT (OUTPATIENT)
Age: 74
End: 2024-02-29

## 2024-02-29 ENCOUNTER — NON-APPOINTMENT (OUTPATIENT)
Age: 74
End: 2024-02-29

## 2024-02-29 ENCOUNTER — APPOINTMENT (OUTPATIENT)
Dept: CARDIOLOGY | Facility: CLINIC | Age: 74
End: 2024-02-29
Payer: COMMERCIAL

## 2024-02-29 VITALS
BODY MASS INDEX: 16.39 KG/M2 | RESPIRATION RATE: 16 BRPM | HEIGHT: 66 IN | SYSTOLIC BLOOD PRESSURE: 135 MMHG | HEART RATE: 74 BPM | WEIGHT: 102 LBS | TEMPERATURE: 97.3 F | OXYGEN SATURATION: 99 % | DIASTOLIC BLOOD PRESSURE: 85 MMHG

## 2024-02-29 VITALS — HEART RATE: 78 BPM

## 2024-02-29 DIAGNOSIS — R01.1 CARDIAC MURMUR, UNSPECIFIED: ICD-10-CM

## 2024-02-29 DIAGNOSIS — E78.5 HYPERLIPIDEMIA, UNSPECIFIED: ICD-10-CM

## 2024-02-29 DIAGNOSIS — I10 ESSENTIAL (PRIMARY) HYPERTENSION: ICD-10-CM

## 2024-02-29 PROCEDURE — 99214 OFFICE O/P EST MOD 30 MIN: CPT

## 2024-02-29 PROCEDURE — G2211 COMPLEX E/M VISIT ADD ON: CPT

## 2024-02-29 PROCEDURE — 93000 ELECTROCARDIOGRAM COMPLETE: CPT

## 2024-02-29 NOTE — DISCUSSION/SUMMARY
[FreeTextEntry1] : This is a 73-year-old female past medical history significant for hypertension, mitral valve regurgitation, Mohs surgery, s/p cataract surgery who comes in for follow-up cardiac evaluation.  She denies chest pain, shortness of breath, dizziness or syncope. The patient has no history of rheumatic fever.  Her cardiac risk factors include a remote history of smoking (he stopped over 35 years ago, and apparently now hypertension. The patient's blood pressure is under much better control on Micardis hydrochlorothiazide 80/12.5 mg daily. Electrocardiogram done February 29, 2024 demonstrated normal sinus rhythm rate 78 bpm is otherwise remarkable for left atrial abnormality. She will have new blood work done today for lipid panel, electrolytes, and hemoglobin A1c.  She plans to make an appointment with a new primary care physician in the next 2 months. Echo Doppler examination done April 11, 2022 demonstrated mild mitral valve regurgitation, mild tricuspid valve regurgitation, thickened aortic valve leaflets with normal opening and estimated ejection fraction of 65%. Echo Doppler examination done April 11, 2022 demonstrated mild mitral mild tricuspid valve regurgitation, thickened and mildly calcified aortic valve leaflets with normal opening and normal ejection fraction of 65%. Electrocardiogram done April 11, 2022 demonstrated normal sinus rhythm rate 75 bpm is otherwise remarkable for left atrial abnormality and poor R wave progression. Electrocardiogram done September 23, 2020 demonstrated normal sinus rhythm rate 70 bpm is otherwise remarkable for left atrial abnormality and a Q wave in V1 and V2 with poor R wave progression. The patient had a normal nuclear stress test October 27, 2020 with an estimated ejection fraction of 79%. Electrocardiogram done October 4, 2019 demonstrated normal sinus rhythm rate 67 beats per minute is otherwise remarkable for poor R-wave progression.  The patient understands that aerobic exercises must be increased to 40 minutes 4 times per week. A detailed discussion of lifestyle modification was done today. The patient has a good understanding of the diagnosis, and treatment plan. Lifestyle modification was also outlined.

## 2024-02-29 NOTE — CARDIOLOGY SUMMARY
[___] : [unfilled] [de-identified] : 4/11/22 [de-identified] : NUC: 10/27/2020 [de-identified] : 9/25/2019 -Not on any medication as per [facility] papers  -supportive care  -nutritional support

## 2024-02-29 NOTE — REASON FOR VISIT
[CV Risk Factors and Non-Cardiac Disease] : CV risk factors and non-cardiac disease [Follow-Up - Clinic] : a clinic follow-up of [Hyperlipidemia] : hyperlipidemia [Hypertension] : hypertension [FreeTextEntry1] : murmur

## 2024-02-29 NOTE — PHYSICAL EXAM
[Well Developed] : well developed [Well Nourished] : well nourished [No Acute Distress] : no acute distress [Normal Venous Pressure] : normal venous pressure [No Carotid Bruit] : no carotid bruit [Normal S1, S2] : normal S1, S2 [No Murmur] : no murmur [No Rub] : no rub [Clear Lung Fields] : clear lung fields [Good Air Entry] : good air entry [No Respiratory Distress] : no respiratory distress  [Soft] : abdomen soft [Non Tender] : non-tender [No Masses/organomegaly] : no masses/organomegaly [Normal Bowel Sounds] : normal bowel sounds [Normal Gait] : normal gait [No Cyanosis] : no cyanosis [No Edema] : no edema [No Clubbing] : no clubbing [No Varicosities] : no varicosities [No Rash] : no rash [No Skin Lesions] : no skin lesions [Moves all extremities] : moves all extremities [No Focal Deficits] : no focal deficits [Normal Speech] : normal speech [Alert and Oriented] : alert and oriented [Normal memory] : normal memory [General Appearance - Well Developed] : well developed [Normal Appearance] : normal appearance [Well Groomed] : well groomed [No Deformities] : no deformities [General Appearance - Well Nourished] : well nourished [General Appearance - In No Acute Distress] : no acute distress [Normal Conjunctiva] : the conjunctiva exhibited no abnormalities [Normal Oral Mucosa] : normal oral mucosa [Normal Oropharynx] : normal oropharynx [Normal Jugular Venous A Waves Present] : normal jugular venous A waves present [Normal Jugular Venous V Waves Present] : normal jugular venous V waves present [No Jugular Venous Fish A Waves] : no jugular venous fish A waves [Respiration, Rhythm And Depth] : normal respiratory rhythm and effort [Exaggerated Use Of Accessory Muscles For Inspiration] : no accessory muscle use [Auscultation Breath Sounds / Voice Sounds] : lungs were clear to auscultation bilaterally [Bowel Sounds] : normal bowel sounds [Abdomen Soft] : soft [Abnormal Walk] : normal gait [Gait - Sufficient For Exercise Testing] : the gait was sufficient for exercise testing [Nail Clubbing] : no clubbing of the fingernails [Skin Color & Pigmentation] : normal skin color and pigmentation [Cyanosis, Localized] : no localized cyanosis [Skin Turgor] : normal skin turgor [] : no rash [Oriented To Time, Place, And Person] : oriented to person, place, and time [Affect] : the affect was normal [Mood] : the mood was normal [No Anxiety] : not feeling anxious [5th Left ICS - MCL] : palpated at the 5th LICS in the midclavicular line [Normal] : normal [Normal Rate] : normal [No Precordial Heave] : no precordial heave was noted [Heart Rate ___] : [unfilled] bpm [Rhythm Regular] : regular [Normal S1] : normal S1 [Normal S2] : normal S2 [No Gallop] : no gallop heard [II] : a grade 2 [2+] : left 2+ [No Abnormalities] : the abdominal aorta was not enlarged and no bruit was heard [No Pitting Edema] : no pitting edema present [S3] : no S3 [S4] : no S4 [Click] : no click [Pericardial Rub] : no pericardial rub [Right Carotid Bruit] : no bruit heard over the right carotid [Left Carotid Bruit] : no bruit heard over the left carotid [Right Femoral Bruit] : no bruit heard over the right femoral artery [Left Femoral Bruit] : no bruit heard over the left femoral artery

## 2024-03-05 RX ORDER — BROMFENAC SODIUM 0.7 MG/ML
0.07 SOLUTION/ DROPS OPHTHALMIC
Qty: 3 | Refills: 0 | Status: ACTIVE | COMMUNITY
Start: 2023-11-17

## 2024-03-05 RX ORDER — LATANOPROST/PF 0.005 %
0.01 DROPS OPHTHALMIC (EYE)
Qty: 2 | Refills: 0 | Status: ACTIVE | COMMUNITY
Start: 2023-11-17

## 2024-03-05 RX ORDER — DICLOFENAC SODIUM 1 MG/ML
0.1 SOLUTION OPHTHALMIC
Qty: 5 | Refills: 0 | Status: ACTIVE | COMMUNITY
Start: 2023-11-22

## 2024-03-05 RX ORDER — FLUTICASONE PROPIONATE 50 UG/1
50 SPRAY, METERED NASAL
Qty: 16 | Refills: 0 | Status: ACTIVE | COMMUNITY
Start: 2024-01-02

## 2024-03-06 RX ORDER — POTASSIUM CHLORIDE 750 MG/1
10 TABLET, EXTENDED RELEASE ORAL
Qty: 65 | Refills: 1 | Status: ACTIVE | COMMUNITY
Start: 2024-03-04 | End: 1900-01-01

## 2024-03-13 ENCOUNTER — OFFICE (OUTPATIENT)
Dept: URBAN - METROPOLITAN AREA CLINIC 35 | Facility: CLINIC | Age: 74
Setting detail: OPHTHALMOLOGY
End: 2024-03-13
Payer: COMMERCIAL

## 2024-03-13 DIAGNOSIS — Z96.1: ICD-10-CM

## 2024-03-13 DIAGNOSIS — H01.001: ICD-10-CM

## 2024-03-13 DIAGNOSIS — H11.823: ICD-10-CM

## 2024-03-13 DIAGNOSIS — H40.013: ICD-10-CM

## 2024-03-13 DIAGNOSIS — C44.329: ICD-10-CM

## 2024-03-13 DIAGNOSIS — H01.004: ICD-10-CM

## 2024-03-13 DIAGNOSIS — H40.041: ICD-10-CM

## 2024-03-13 DIAGNOSIS — H16.223: ICD-10-CM

## 2024-03-13 DIAGNOSIS — H26.493: ICD-10-CM

## 2024-03-13 DIAGNOSIS — H40.053: ICD-10-CM

## 2024-03-13 DIAGNOSIS — H35.353: ICD-10-CM

## 2024-03-13 DIAGNOSIS — H35.373: ICD-10-CM

## 2024-03-13 PROCEDURE — 92133 CPTRZD OPH DX IMG PST SGM ON: CPT | Performed by: OPHTHALMOLOGY

## 2024-03-13 PROCEDURE — 92012 INTRM OPH EXAM EST PATIENT: CPT | Performed by: OPHTHALMOLOGY

## 2024-03-13 PROCEDURE — 92083 EXTENDED VISUAL FIELD XM: CPT | Performed by: OPHTHALMOLOGY

## 2024-03-13 ASSESSMENT — REFRACTION_CURRENTRX
OD_SPHERE: +2.00
OS_SPHERE: +1.75
OS_OVR_VA: 20/
OS_OVR_VA: 20/
OS_VPRISM_DIRECTION: SV
OD_VPRISM_DIRECTION: SV
OS_OVR_VA: 20/
OS_AXIS: 097
OS_VPRISM_DIRECTION: SV
OD_OVR_VA: 20/
OD_OVR_VA: 20/
OD_SPHERE: +1.75
OD_VPRISM_DIRECTION: SV
OD_VPRISM_DIRECTION: SV
OS_SPHERE: +2.75
OS_SPHERE: +2.00
OD_SPHERE: +1.75
OS_VPRISM_DIRECTION: SV
OS_CYLINDER: -2.25
OD_OVR_VA: 20/

## 2024-03-13 ASSESSMENT — REFRACTION_MANIFEST
OD_VA1: 20/20
OD_SPHERE: +0.50
OS_CYLINDER: -1.00
OD_VA1: 20/25-3
OD_CYLINDER: -1.75
OS_AXIS: 090
OS_SPHERE: +0.50
OD_AXIS: 075
OS_SPHERE: +0.50
OD_CYLINDER: -1.00
OD_CYLINDER: -0.75
OS_VA1: 20/25-2
OD_SPHERE: +1.50
OS_AXIS: 105
OD_AXIS: 065
OS_SPHERE: +0.25
OD_SPHERE: +1.50
OS_SPHERE: +0.75
OS_CYLINDER: -1.00
OD_SPHERE: +0.50
OD_AXIS: 070
OD_CYLINDER: -2.50
OS_VA1: 20/20
OS_CYLINDER: -1.25
OD_VA1: 20/25-3
OD_VA1: 20/40-2
OS_AXIS: 090
OD_AXIS: 070
OS_VA1: 20/30+
OS_CYLINDER: -1.00
OS_AXIS: 105
OS_VA1: 20/25+1

## 2024-03-13 ASSESSMENT — LID EXAM ASSESSMENTS
OD_BLEPHARITIS: RUL T
OS_COMMENTS: CRUSTING ON LIDS
OS_BLEPHARITIS: LUL T
OD_COMMENTS: CRUSTING ON LIDS

## 2024-03-13 ASSESSMENT — SPHEQUIV_DERIVED
OD_SPHEQUIV: 0.125
OD_SPHEQUIV: 0
OD_SPHEQUIV: 0.25
OS_SPHEQUIV: 0
OS_SPHEQUIV: 0.25
OS_SPHEQUIV: -0.375
OS_SPHEQUIV: 0
OD_SPHEQUIV: 0.625

## 2024-04-02 ENCOUNTER — APPOINTMENT (OUTPATIENT)
Dept: GASTROENTEROLOGY | Facility: CLINIC | Age: 74
End: 2024-04-02

## 2024-04-07 RX ORDER — TELMISARTAN AND HYDROCHLOROTHIAZIDE 80; 12.5 MG/1; MG/1
80-12.5 TABLET ORAL DAILY
Qty: 90 | Refills: 1 | Status: ACTIVE | COMMUNITY
Start: 2023-02-20 | End: 1900-01-01

## 2024-04-15 ENCOUNTER — RX RENEWAL (OUTPATIENT)
Age: 74
End: 2024-04-15

## 2024-05-01 NOTE — DISCUSSION/SUMMARY
[FreeTextEntry1] : This is a 69-year-old female past medical history significant for Mohs surgery, who is scheduled for cataract surgery in the next 2 weeks he comes in for cardiac preoperative consultation.\par The patient's blood pressure still elevated today.  The patient will increase her Micardis 80 mg daily.\par I reviewed the results of the patient's recent blood work, from October 4, 2019.  She has normal left ventricular function.\par \par The patient is clear for cataract surgery.  She will continue her Micardis 80 mg daily starting tomorrow morning.  She will followup with me 3-4 weeks if the surgery is completed.  She understands she must take her medication the morning of surgery.\par \par She denies chest pain, shortness of breath, dizziness, palpitations or syncope.\par The patient has no history of rheumatic fever.  Her cardiac risk factors include a remote history of smoking (he stopped over 35 years ago, and apparently now hypertension.\par Electrocardiogram done October 4, 2019 demonstrated normal sinus rhythm rate 67 beats per minute is otherwise remarkable for poor R-wave progression.\par  She exercises on a daily basis and has good aerobic capacity.\par She is encouraged to limit her salt intake.  She will continue on her good aerobic exercise program.
Never smoker

## 2024-07-31 DIAGNOSIS — I10 ESSENTIAL (PRIMARY) HYPERTENSION: ICD-10-CM

## 2024-07-31 DIAGNOSIS — R01.1 CARDIAC MURMUR, UNSPECIFIED: ICD-10-CM

## 2024-07-31 DIAGNOSIS — E78.5 HYPERLIPIDEMIA, UNSPECIFIED: ICD-10-CM

## 2024-08-15 ENCOUNTER — APPOINTMENT (OUTPATIENT)
Dept: CARDIOLOGY | Facility: CLINIC | Age: 74
End: 2024-08-15

## 2024-08-15 PROCEDURE — 93306 TTE W/DOPPLER COMPLETE: CPT

## 2024-09-23 ENCOUNTER — APPOINTMENT (OUTPATIENT)
Dept: CARDIOLOGY | Facility: CLINIC | Age: 74
End: 2024-09-23
Payer: COMMERCIAL

## 2024-09-23 VITALS
OXYGEN SATURATION: 100 % | HEART RATE: 67 BPM | SYSTOLIC BLOOD PRESSURE: 128 MMHG | DIASTOLIC BLOOD PRESSURE: 80 MMHG | TEMPERATURE: 97.3 F | BODY MASS INDEX: 18 KG/M2 | WEIGHT: 112 LBS | HEIGHT: 66 IN | RESPIRATION RATE: 16 BRPM

## 2024-09-23 DIAGNOSIS — E78.5 HYPERLIPIDEMIA, UNSPECIFIED: ICD-10-CM

## 2024-09-23 DIAGNOSIS — I10 ESSENTIAL (PRIMARY) HYPERTENSION: ICD-10-CM

## 2024-09-23 DIAGNOSIS — R06.09 OTHER FORMS OF DYSPNEA: ICD-10-CM

## 2024-09-23 DIAGNOSIS — I07.1 RHEUMATIC TRICUSPID INSUFFICIENCY: ICD-10-CM

## 2024-09-23 DIAGNOSIS — R01.1 CARDIAC MURMUR, UNSPECIFIED: ICD-10-CM

## 2024-09-23 PROCEDURE — 99213 OFFICE O/P EST LOW 20 MIN: CPT | Mod: 25

## 2024-09-23 PROCEDURE — 93015 CV STRESS TEST SUPVJ I&R: CPT

## 2024-09-23 NOTE — CARDIOLOGY SUMMARY
[___] : [unfilled] [de-identified] : 4/11/22 [de-identified] : NUC: 10/27/2020 [de-identified] : 9/25/2019

## 2024-09-23 NOTE — DISCUSSION/SUMMARY
[FreeTextEntry1] : This is a 74-year-old female past medical history significant for hypertension, mitral valve regurgitation, Mohs surgery, s/p cataract surgery who comes in for follow-up cardiac evaluation.  She denies chest pain, shortness of breath, dizziness or syncope. The patient has no history of rheumatic fever.  Her cardiac risk factors include a remote history of smoking (he stopped over 35 years ago, and apparently now hypertension. The patient had a normal exercise stress test September 23, 2024. Her blood pressure is under good control on Micardis hydrochlorothiazide 80/12.5 mg in the morning and potassium chloride 10 mEq Monday through Friday. Lipid panel done February 29, 2024 demonstrated cholesterol 217, HDL of 94, triglycerides of 88, LDL calculated 108 mg/dL, and non-HDL cholesterol 123 mg/dL and LDL direct 114 mg/dL with hemoglobin A1c of 5.3. She will be going for new blood work in the next few months with her primary care physician.  She will get me a copy of those results for my review. Echo Doppler examination done August 15, 2024 demonstrated minimal mitral valve regurgitation, mild tricuspid valve regurgitation, minimal thickening of the aortic valve leaflets normal ejection fraction of 65%. She will continue on her current diet and exercise program. The patient's blood pressure is under much better control on Micardis hydrochlorothiazide 80/12.5 mg daily. Electrocardiogram done February 29, 2024 demonstrated normal sinus rhythm rate 78 bpm is otherwise remarkable for left atrial abnormality.  Echo Doppler examination done April 11, 2022 demonstrated mild mitral valve regurgitation, mild tricuspid valve regurgitation, thickened aortic valve leaflets with normal opening and estimated ejection fraction of 65%. Echo Doppler examination done April 11, 2022 demonstrated mild mitral mild tricuspid valve regurgitation, thickened and mildly calcified aortic valve leaflets with normal opening and normal ejection fraction of 65%. Electrocardiogram done April 11, 2022 demonstrated normal sinus rhythm rate 75 bpm is otherwise remarkable for left atrial abnormality and poor R wave progression. Electrocardiogram done September 23, 2020 demonstrated normal sinus rhythm rate 70 bpm is otherwise remarkable for left atrial abnormality and a Q wave in V1 and V2 with poor R wave progression. The patient had a normal nuclear stress test October 27, 2020 with an estimated ejection fraction of 79%. Electrocardiogram done October 4, 2019 demonstrated normal sinus rhythm rate 67 beats per minute is otherwise remarkable for poor R-wave progression.  The patient understands that aerobic exercises must be increased to 40 minutes 4 times per week. A detailed discussion of lifestyle modification was done today. The patient has a good understanding of the diagnosis, and treatment plan. Lifestyle modification was also outlined.

## 2024-10-08 ENCOUNTER — RX RENEWAL (OUTPATIENT)
Age: 74
End: 2024-10-08

## 2024-11-07 ENCOUNTER — DOCTOR'S OFFICE (OUTPATIENT)
Facility: LOCATION | Age: 74
Setting detail: OPHTHALMOLOGY
End: 2024-11-07
Payer: COMMERCIAL

## 2024-11-07 DIAGNOSIS — H01.001: ICD-10-CM

## 2024-11-07 DIAGNOSIS — H11.823: ICD-10-CM

## 2024-11-07 DIAGNOSIS — H01.004: ICD-10-CM

## 2024-11-07 DIAGNOSIS — H40.013: ICD-10-CM

## 2024-11-07 DIAGNOSIS — Z96.1: ICD-10-CM

## 2024-11-07 DIAGNOSIS — H40.041: ICD-10-CM

## 2024-11-07 DIAGNOSIS — H26.493: ICD-10-CM

## 2024-11-07 DIAGNOSIS — H16.223: ICD-10-CM

## 2024-11-07 DIAGNOSIS — H40.053: ICD-10-CM

## 2024-11-07 DIAGNOSIS — C44.329: ICD-10-CM

## 2024-11-07 DIAGNOSIS — H35.353: ICD-10-CM

## 2024-11-07 DIAGNOSIS — H35.373: ICD-10-CM

## 2024-11-07 PROCEDURE — 92014 COMPRE OPH EXAM EST PT 1/>: CPT | Performed by: OPHTHALMOLOGY

## 2024-11-07 PROCEDURE — 92134 CPTRZ OPH DX IMG PST SGM RTA: CPT | Performed by: OPHTHALMOLOGY

## 2024-11-07 ASSESSMENT — CONFRONTATIONAL VISUAL FIELD TEST (CVF)
OS_FINDINGS: FULL
OD_FINDINGS: FULL

## 2024-11-07 ASSESSMENT — SUPERFICIAL PUNCTATE KERATITIS (SPK)
OD_SPK: T
OS_SPK: T

## 2024-11-07 ASSESSMENT — REFRACTION_MANIFEST
OD_VA1: 20/20
OD_AXIS: 070
OS_CYLINDER: -1.00
OS_CYLINDER: -1.00
OD_VA1: 20/40-2
OS_AXIS: 090
OD_AXIS: 070
OS_AXIS: 090
OD_CYLINDER: -2.50
OS_SPHERE: +0.75
OS_AXIS: 105
OS_CYLINDER: -1.00
OS_SPHERE: +0.50
OS_SPHERE: +0.50
OD_CYLINDER: -1.75
OS_VA1: 20/25-2
OS_VA1: 20/20
OS_VA1: 20/25+1
OD_AXIS: 065
OD_SPHERE: +0.50
OD_VA1: 20/25-3
OS_AXIS: 105
OD_SPHERE: +1.50
OD_CYLINDER: -1.00
OS_SPHERE: +0.25
OD_AXIS: 075
OD_SPHERE: +1.50
OD_SPHERE: +0.50
OD_VA1: 20/25-3
OS_VA1: 20/30+
OD_CYLINDER: -0.75
OS_CYLINDER: -1.25

## 2024-11-07 ASSESSMENT — KERATOMETRY
OD_K2POWER_DIOPTERS: 42.00
OD_K1POWER_DIOPTERS: 41.75
METHOD_AUTO_MANUAL: AUTO
OD_AXISANGLE_DEGREES: 005
OS_K2POWER_DIOPTERS: 41.75
OS_K1POWER_DIOPTERS: 41.50
OS_AXISANGLE_DEGREES: 127

## 2024-11-07 ASSESSMENT — REFRACTION_CURRENTRX
OS_VPRISM_DIRECTION: SV
OS_SPHERE: +2.75
OS_CYLINDER: -2.25
OD_OVR_VA: 20/
OD_VPRISM_DIRECTION: SV
OD_OVR_VA: 20/
OS_OVR_VA: 20/
OS_SPHERE: +1.75
OD_SPHERE: +2.00
OS_VPRISM_DIRECTION: SV
OS_OVR_VA: 20/
OD_VPRISM_DIRECTION: SV
OS_OVR_VA: 20/
OS_AXIS: 097
OD_OVR_VA: 20/
OD_SPHERE: +1.75
OS_VPRISM_DIRECTION: SV
OS_SPHERE: +2.00
OD_SPHERE: +1.75
OD_VPRISM_DIRECTION: SV

## 2024-11-07 ASSESSMENT — LID EXAM ASSESSMENTS
OD_BLEPHARITIS: RUL T
OS_COMMENTS: CRUSTING ON LIDS
OD_COMMENTS: CRUSTING ON LIDS
OS_BLEPHARITIS: LUL T

## 2024-11-07 ASSESSMENT — REFRACTION_AUTOREFRACTION
OD_AXIS: 082
OD_CYLINDER: -0.75
OD_SPHERE: PLANO
OS_AXIS: 101
OS_CYLINDER: -1.25
OS_SPHERE: +0.50

## 2024-11-07 ASSESSMENT — VISUAL ACUITY
OD_BCVA: 20/20-1
OS_BCVA: 20/30+1

## 2024-12-23 ENCOUNTER — NON-APPOINTMENT (OUTPATIENT)
Age: 74
End: 2024-12-23

## 2024-12-25 ENCOUNTER — NON-APPOINTMENT (OUTPATIENT)
Age: 74
End: 2024-12-25

## 2025-01-01 ENCOUNTER — APPOINTMENT (OUTPATIENT)
Dept: ULTRASOUND IMAGING | Facility: IMAGING CENTER | Age: 75
End: 2025-01-01
Payer: COMMERCIAL

## 2025-01-01 ENCOUNTER — INPATIENT (INPATIENT)
Facility: HOSPITAL | Age: 75
LOS: 0 days | Discharge: HOME | DRG: 376 | End: 2025-05-10
Attending: INTERNAL MEDICINE | Admitting: INTERNAL MEDICINE
Payer: COMMERCIAL

## 2025-01-01 ENCOUNTER — RESULT REVIEW (OUTPATIENT)
Age: 75
End: 2025-01-01

## 2025-01-01 ENCOUNTER — APPOINTMENT (OUTPATIENT)
Dept: HEMATOLOGY ONCOLOGY | Facility: CLINIC | Age: 75
End: 2025-01-01

## 2025-01-01 ENCOUNTER — APPOINTMENT (OUTPATIENT)
Dept: ORTHOPEDIC SURGERY | Facility: CLINIC | Age: 75
End: 2025-01-01

## 2025-01-01 ENCOUNTER — INPATIENT (INPATIENT)
Facility: HOSPITAL | Age: 75
LOS: 2 days | End: 2025-06-19
Attending: INTERNAL MEDICINE | Admitting: INTERNAL MEDICINE
Payer: COMMERCIAL

## 2025-01-01 ENCOUNTER — OUTPATIENT (OUTPATIENT)
Dept: OUTPATIENT SERVICES | Facility: HOSPITAL | Age: 75
LOS: 1 days | End: 2025-01-01
Payer: COMMERCIAL

## 2025-01-01 ENCOUNTER — OUTPATIENT (OUTPATIENT)
Dept: OUTPATIENT SERVICES | Facility: HOSPITAL | Age: 75
LOS: 1 days | End: 2025-01-01

## 2025-01-01 ENCOUNTER — OUTPATIENT (OUTPATIENT)
Dept: OUTPATIENT SERVICES | Facility: HOSPITAL | Age: 75
LOS: 1 days | Discharge: HOME | End: 2025-01-01

## 2025-01-01 VITALS
HEART RATE: 86 BPM | SYSTOLIC BLOOD PRESSURE: 116 MMHG | DIASTOLIC BLOOD PRESSURE: 80 MMHG | TEMPERATURE: 97 F | RESPIRATION RATE: 17 BRPM | OXYGEN SATURATION: 99 %

## 2025-01-01 VITALS — HEIGHT: 66 IN

## 2025-01-01 VITALS — RESPIRATION RATE: 29 BRPM | HEART RATE: 120 BPM | OXYGEN SATURATION: 98 %

## 2025-01-01 VITALS
RESPIRATION RATE: 18 BRPM | SYSTOLIC BLOOD PRESSURE: 130 MMHG | OXYGEN SATURATION: 98 % | DIASTOLIC BLOOD PRESSURE: 84 MMHG | HEART RATE: 89 BPM | TEMPERATURE: 97 F

## 2025-01-01 DIAGNOSIS — R79.89 OTHER SPECIFIED ABNORMAL FINDINGS OF BLOOD CHEMISTRY: ICD-10-CM

## 2025-01-01 DIAGNOSIS — C78.6 SECONDARY MALIGNANT NEOPLASM OF RETROPERITONEUM AND PERITONEUM: ICD-10-CM

## 2025-01-01 DIAGNOSIS — R73.9 HYPERGLYCEMIA, UNSPECIFIED: ICD-10-CM

## 2025-01-01 DIAGNOSIS — E87.1 HYPO-OSMOLALITY AND HYPONATREMIA: ICD-10-CM

## 2025-01-01 DIAGNOSIS — C48.2 MALIGNANT NEOPLASM OF PERITONEUM, UNSPECIFIED: ICD-10-CM

## 2025-01-01 DIAGNOSIS — Z00.8 ENCOUNTER FOR OTHER GENERAL EXAMINATION: ICD-10-CM

## 2025-01-01 DIAGNOSIS — J90 PLEURAL EFFUSION, NOT ELSEWHERE CLASSIFIED: ICD-10-CM

## 2025-01-01 DIAGNOSIS — C18.9 MALIGNANT NEOPLASM OF COLON, UNSPECIFIED: ICD-10-CM

## 2025-01-01 DIAGNOSIS — N17.9 ACUTE KIDNEY FAILURE, UNSPECIFIED: ICD-10-CM

## 2025-01-01 LAB
-  STREPTOCOCCUS SP. (NOT GRP A, B OR S PNEUMONIAE): SIGNIFICANT CHANGE UP
A1C WITH ESTIMATED AVERAGE GLUCOSE RESULT: 5.8 % — HIGH (ref 4–5.6)
ACANTHOCYTES BLD QL SMEAR: SLIGHT — SIGNIFICANT CHANGE UP
ADD ON TEST-SPECIMEN IN LAB: SIGNIFICANT CHANGE UP
ALBUMIN SERPL ELPH-MCNC: 1.7 G/DL — LOW (ref 3.3–5)
ALBUMIN SERPL ELPH-MCNC: 1.8 G/DL — LOW (ref 3.3–5)
ALBUMIN SERPL ELPH-MCNC: 1.9 G/DL — LOW (ref 3.3–5)
ALBUMIN SERPL ELPH-MCNC: 1.9 G/DL — LOW (ref 3.3–5)
ALBUMIN SERPL ELPH-MCNC: 2.2 G/DL — LOW (ref 3.3–5)
ALBUMIN SERPL ELPH-MCNC: 2.2 G/DL — LOW (ref 3.3–5)
ALBUMIN SERPL ELPH-MCNC: 3.3 G/DL — SIGNIFICANT CHANGE UP (ref 3.3–5)
ALP SERPL-CCNC: 120 U/L — SIGNIFICANT CHANGE UP (ref 40–120)
ALP SERPL-CCNC: 127 U/L — HIGH (ref 40–120)
ALP SERPL-CCNC: 128 U/L — HIGH (ref 40–120)
ALP SERPL-CCNC: 48 U/L — SIGNIFICANT CHANGE UP (ref 40–120)
ALP SERPL-CCNC: 74 U/L — SIGNIFICANT CHANGE UP (ref 40–120)
ALP SERPL-CCNC: 74 U/L — SIGNIFICANT CHANGE UP (ref 40–120)
ALP SERPL-CCNC: 83 U/L — SIGNIFICANT CHANGE UP (ref 40–120)
ALP SERPL-CCNC: 88 U/L — SIGNIFICANT CHANGE UP (ref 40–120)
ALP SERPL-CCNC: 88 U/L — SIGNIFICANT CHANGE UP (ref 40–120)
ALT FLD-CCNC: 13 U/L — SIGNIFICANT CHANGE UP (ref 4–33)
ALT FLD-CCNC: 15 U/L — SIGNIFICANT CHANGE UP (ref 4–33)
ALT FLD-CCNC: 15 U/L — SIGNIFICANT CHANGE UP (ref 4–33)
ALT FLD-CCNC: 20 U/L — SIGNIFICANT CHANGE UP (ref 4–33)
ALT FLD-CCNC: 47 U/L — HIGH (ref 4–33)
ALT FLD-CCNC: 47 U/L — HIGH (ref 4–33)
ALT FLD-CCNC: 48 U/L — HIGH (ref 4–33)
ALT FLD-CCNC: 6 U/L — LOW (ref 10–45)
ALT FLD-CCNC: 6 U/L — SIGNIFICANT CHANGE UP (ref 4–33)
AMMONIA BLD-MCNC: 17 UMOL/L — SIGNIFICANT CHANGE UP (ref 11–55)
ANION GAP SERPL CALC-SCNC: 12 MMOL/L — SIGNIFICANT CHANGE UP (ref 7–14)
ANION GAP SERPL CALC-SCNC: 13 MMOL/L — SIGNIFICANT CHANGE UP (ref 5–17)
ANION GAP SERPL CALC-SCNC: 14 MMOL/L — SIGNIFICANT CHANGE UP (ref 7–14)
ANION GAP SERPL CALC-SCNC: 14 MMOL/L — SIGNIFICANT CHANGE UP (ref 7–14)
ANION GAP SERPL CALC-SCNC: 15 MMOL/L — HIGH (ref 7–14)
ANION GAP SERPL CALC-SCNC: 16 MMOL/L — HIGH (ref 7–14)
ANION GAP SERPL CALC-SCNC: 17 MMOL/L — HIGH (ref 7–14)
ANION GAP SERPL CALC-SCNC: 20 MMOL/L — HIGH (ref 7–14)
ANISOCYTOSIS BLD QL: SLIGHT — SIGNIFICANT CHANGE UP
ANISOCYTOSIS BLD QL: SLIGHT — SIGNIFICANT CHANGE UP
APPEARANCE UR: ABNORMAL
APTT BLD: 29 SEC — SIGNIFICANT CHANGE UP (ref 26.1–36.8)
APTT BLD: 31.4 SEC — SIGNIFICANT CHANGE UP (ref 26.1–36.8)
APTT BLD: 33.5 SEC — SIGNIFICANT CHANGE UP (ref 26.1–36.8)
APTT BLD: 34.6 SEC — SIGNIFICANT CHANGE UP (ref 26.1–36.8)
APTT BLD: 35.7 SEC — SIGNIFICANT CHANGE UP (ref 26.1–36.8)
APTT BLD: 37.7 SEC — HIGH (ref 26.1–36.8)
APTT BLD: 41.5 SEC — HIGH (ref 26.1–36.8)
APTT BLD: 50.3 SEC — HIGH (ref 26.1–36.8)
AST SERPL-CCNC: 152 U/L — HIGH (ref 4–32)
AST SERPL-CCNC: 160 U/L — HIGH (ref 4–32)
AST SERPL-CCNC: 172 U/L — HIGH (ref 4–32)
AST SERPL-CCNC: 18 U/L — SIGNIFICANT CHANGE UP (ref 10–40)
AST SERPL-CCNC: 23 U/L — SIGNIFICANT CHANGE UP (ref 4–32)
AST SERPL-CCNC: 38 U/L — HIGH (ref 4–32)
AST SERPL-CCNC: 41 U/L — HIGH (ref 4–32)
AST SERPL-CCNC: 49 U/L — HIGH (ref 4–32)
AST SERPL-CCNC: 69 U/L — HIGH (ref 4–32)
B PERT DNA SPEC QL NAA+PROBE: SIGNIFICANT CHANGE UP
B PERT+PARAPERT DNA PNL SPEC NAA+PROBE: SIGNIFICANT CHANGE UP
BACTERIA # UR AUTO: ABNORMAL /HPF
BASOPHILS # BLD AUTO: 0 K/UL — SIGNIFICANT CHANGE UP (ref 0–0.2)
BASOPHILS # BLD AUTO: 0 K/UL — SIGNIFICANT CHANGE UP (ref 0–0.2)
BASOPHILS # BLD AUTO: 0.06 K/UL — SIGNIFICANT CHANGE UP (ref 0–0.2)
BASOPHILS # BLD AUTO: 0.09 K/UL — SIGNIFICANT CHANGE UP (ref 0–0.2)
BASOPHILS # BLD AUTO: 0.18 K/UL — SIGNIFICANT CHANGE UP (ref 0–0.2)
BASOPHILS NFR BLD AUTO: 0 % — SIGNIFICANT CHANGE UP (ref 0–2)
BASOPHILS NFR BLD AUTO: 0 % — SIGNIFICANT CHANGE UP (ref 0–2)
BASOPHILS NFR BLD AUTO: 0.3 % — SIGNIFICANT CHANGE UP (ref 0–2)
BASOPHILS NFR BLD AUTO: 0.6 % — SIGNIFICANT CHANGE UP (ref 0–2)
BASOPHILS NFR BLD AUTO: 0.6 % — SIGNIFICANT CHANGE UP (ref 0–2)
BILIRUB SERPL-MCNC: 0.2 MG/DL — SIGNIFICANT CHANGE UP (ref 0.2–1.2)
BILIRUB SERPL-MCNC: 0.3 MG/DL — SIGNIFICANT CHANGE UP (ref 0.2–1.2)
BILIRUB SERPL-MCNC: 0.4 MG/DL — SIGNIFICANT CHANGE UP (ref 0.2–1.2)
BILIRUB SERPL-MCNC: 0.4 MG/DL — SIGNIFICANT CHANGE UP (ref 0.2–1.2)
BILIRUB SERPL-MCNC: 0.5 MG/DL — SIGNIFICANT CHANGE UP (ref 0.2–1.2)
BILIRUB UR-MCNC: ABNORMAL
BLD GP AB SCN SERPL QL: NEGATIVE — SIGNIFICANT CHANGE UP
BLOOD GAS ARTERIAL - LYTES,HGB,ICA,LACT RESULT: SIGNIFICANT CHANGE UP
BLOOD GAS ARTERIAL COMPREHENSIVE RESULT: SIGNIFICANT CHANGE UP
BLOOD GAS VENOUS COMPREHENSIVE RESULT: SIGNIFICANT CHANGE UP
BUN SERPL-MCNC: 16 MG/DL — SIGNIFICANT CHANGE UP (ref 7–23)
BUN SERPL-MCNC: 26 MG/DL — HIGH (ref 7–23)
BUN SERPL-MCNC: 29 MG/DL — HIGH (ref 7–23)
BUN SERPL-MCNC: 33 MG/DL — HIGH (ref 7–23)
BUN SERPL-MCNC: 34 MG/DL — HIGH (ref 7–23)
BUN SERPL-MCNC: 34 MG/DL — HIGH (ref 7–23)
BUN SERPL-MCNC: 35 MG/DL — HIGH (ref 7–23)
BUN SERPL-MCNC: 36 MG/DL — HIGH (ref 7–23)
BUN SERPL-MCNC: 39 MG/DL — HIGH (ref 7–23)
BUN SERPL-MCNC: 40 MG/DL — HIGH (ref 7–23)
BUN SERPL-MCNC: 41 MG/DL — HIGH (ref 7–23)
BURR CELLS BLD QL SMEAR: PRESENT — SIGNIFICANT CHANGE UP
BURR CELLS BLD QL SMEAR: SLIGHT — SIGNIFICANT CHANGE UP
C PNEUM DNA SPEC QL NAA+PROBE: SIGNIFICANT CHANGE UP
CALCIUM SERPL-MCNC: 6.8 MG/DL — LOW (ref 8.4–10.5)
CALCIUM SERPL-MCNC: 7.2 MG/DL — LOW (ref 8.4–10.5)
CALCIUM SERPL-MCNC: 7.2 MG/DL — LOW (ref 8.4–10.5)
CALCIUM SERPL-MCNC: 7.3 MG/DL — LOW (ref 8.4–10.5)
CALCIUM SERPL-MCNC: 7.4 MG/DL — LOW (ref 8.4–10.5)
CALCIUM SERPL-MCNC: 7.4 MG/DL — LOW (ref 8.4–10.5)
CALCIUM SERPL-MCNC: 7.5 MG/DL — LOW (ref 8.4–10.5)
CALCIUM SERPL-MCNC: 7.7 MG/DL — LOW (ref 8.4–10.5)
CALCIUM SERPL-MCNC: 7.9 MG/DL — LOW (ref 8.4–10.5)
CALCIUM SERPL-MCNC: 8.1 MG/DL — LOW (ref 8.4–10.5)
CALCIUM SERPL-MCNC: 8.9 MG/DL — SIGNIFICANT CHANGE UP (ref 8.4–10.5)
CANCER AG125 SERPL-ACNC: 220 U/ML — HIGH
CANCER AG19-9 SERPL-ACNC: 805 U/ML — HIGH
CAST: 1 /LPF — SIGNIFICANT CHANGE UP (ref 0–4)
CEA SERPL-MCNC: 310 NG/ML — HIGH (ref 0–3.8)
CHLORIDE SERPL-SCNC: 87 MMOL/L — LOW (ref 98–107)
CHLORIDE SERPL-SCNC: 89 MMOL/L — LOW (ref 98–107)
CHLORIDE SERPL-SCNC: 93 MMOL/L — LOW (ref 98–107)
CHLORIDE SERPL-SCNC: 95 MMOL/L — LOW (ref 98–107)
CHLORIDE SERPL-SCNC: 95 MMOL/L — LOW (ref 98–107)
CHLORIDE SERPL-SCNC: 97 MMOL/L — LOW (ref 98–107)
CHLORIDE SERPL-SCNC: 97 MMOL/L — LOW (ref 98–107)
CHLORIDE SERPL-SCNC: 98 MMOL/L — SIGNIFICANT CHANGE UP (ref 96–108)
CK MB BLD-MCNC: 2.9 % — HIGH (ref 0–2.5)
CK MB CFR SERPL CALC: 103 NG/ML — HIGH
CK SERPL-CCNC: 3043 U/L — HIGH (ref 25–170)
CK SERPL-CCNC: 341 U/L — HIGH (ref 25–170)
CK SERPL-CCNC: 3501 U/L — HIGH (ref 25–170)
CO2 SERPL-SCNC: 14 MMOL/L — LOW (ref 22–31)
CO2 SERPL-SCNC: 15 MMOL/L — LOW (ref 22–31)
CO2 SERPL-SCNC: 17 MMOL/L — LOW (ref 22–31)
CO2 SERPL-SCNC: 18 MMOL/L — LOW (ref 22–31)
CO2 SERPL-SCNC: 21 MMOL/L — LOW (ref 22–31)
CO2 SERPL-SCNC: 22 MMOL/L — SIGNIFICANT CHANGE UP (ref 22–31)
CO2 SERPL-SCNC: 23 MMOL/L — SIGNIFICANT CHANGE UP (ref 22–31)
CO2 SERPL-SCNC: 24 MMOL/L — SIGNIFICANT CHANGE UP (ref 22–31)
CO2 SERPL-SCNC: 25 MMOL/L — SIGNIFICANT CHANGE UP (ref 22–31)
CO2 SERPL-SCNC: 26 MMOL/L — SIGNIFICANT CHANGE UP (ref 22–31)
CO2 SERPL-SCNC: 27 MMOL/L — SIGNIFICANT CHANGE UP (ref 22–31)
COD CRY URNS QL: PRESENT
COLOR SPEC: SIGNIFICANT CHANGE UP
CORTIS F PM SERPL-MCNC: 133.3 UG/DL — HIGH (ref 2.7–10.5)
CREAT SERPL-MCNC: 0.71 MG/DL — SIGNIFICANT CHANGE UP (ref 0.5–1.3)
CREAT SERPL-MCNC: 1.18 MG/DL — SIGNIFICANT CHANGE UP (ref 0.5–1.3)
CREAT SERPL-MCNC: 1.36 MG/DL — HIGH (ref 0.5–1.3)
CREAT SERPL-MCNC: 1.48 MG/DL — HIGH (ref 0.5–1.3)
CREAT SERPL-MCNC: 1.51 MG/DL — HIGH (ref 0.5–1.3)
CREAT SERPL-MCNC: 1.6 MG/DL — HIGH (ref 0.5–1.3)
CREAT SERPL-MCNC: 1.62 MG/DL — HIGH (ref 0.5–1.3)
CREAT SERPL-MCNC: 1.64 MG/DL — HIGH (ref 0.5–1.3)
CREAT SERPL-MCNC: 1.67 MG/DL — HIGH (ref 0.5–1.3)
CREAT SERPL-MCNC: 1.7 MG/DL — HIGH (ref 0.5–1.3)
CREAT SERPL-MCNC: 1.73 MG/DL — HIGH (ref 0.5–1.3)
CULTURE RESULTS: NO GROWTH — SIGNIFICANT CHANGE UP
CULTURE RESULTS: SIGNIFICANT CHANGE UP
DACRYOCYTES BLD QL SMEAR: SIGNIFICANT CHANGE UP
DIFF PNL FLD: ABNORMAL
EGFR: 31 ML/MIN/1.73M2 — LOW
EGFR: 32 ML/MIN/1.73M2 — LOW
EGFR: 32 ML/MIN/1.73M2 — LOW
EGFR: 33 ML/MIN/1.73M2 — LOW
EGFR: 34 ML/MIN/1.73M2 — LOW
EGFR: 34 ML/MIN/1.73M2 — LOW
EGFR: 36 ML/MIN/1.73M2 — LOW
EGFR: 36 ML/MIN/1.73M2 — LOW
EGFR: 37 ML/MIN/1.73M2 — LOW
EGFR: 37 ML/MIN/1.73M2 — LOW
EGFR: 41 ML/MIN/1.73M2 — LOW
EGFR: 41 ML/MIN/1.73M2 — LOW
EGFR: 48 ML/MIN/1.73M2 — LOW
EGFR: 48 ML/MIN/1.73M2 — LOW
EGFR: 89 ML/MIN/1.73M2 — SIGNIFICANT CHANGE UP
EGFR: 89 ML/MIN/1.73M2 — SIGNIFICANT CHANGE UP
ELLIPTOCYTES BLD QL SMEAR: SLIGHT — SIGNIFICANT CHANGE UP
EOSINOPHIL # BLD AUTO: 0 K/UL — SIGNIFICANT CHANGE UP (ref 0–0.5)
EOSINOPHIL # BLD AUTO: 0 K/UL — SIGNIFICANT CHANGE UP (ref 0–0.5)
EOSINOPHIL # BLD AUTO: 0.01 K/UL — SIGNIFICANT CHANGE UP (ref 0–0.5)
EOSINOPHIL # BLD AUTO: 0.02 K/UL — SIGNIFICANT CHANGE UP (ref 0–0.5)
EOSINOPHIL # BLD AUTO: 0.05 K/UL — SIGNIFICANT CHANGE UP (ref 0–0.5)
EOSINOPHIL NFR BLD AUTO: 0 % — SIGNIFICANT CHANGE UP (ref 0–6)
EOSINOPHIL NFR BLD AUTO: 0 % — SIGNIFICANT CHANGE UP (ref 0–6)
EOSINOPHIL NFR BLD AUTO: 0.1 % — SIGNIFICANT CHANGE UP (ref 0–6)
EOSINOPHIL NFR BLD AUTO: 0.1 % — SIGNIFICANT CHANGE UP (ref 0–6)
EOSINOPHIL NFR BLD AUTO: 0.2 % — SIGNIFICANT CHANGE UP (ref 0–6)
ESTIMATED AVERAGE GLUCOSE: 120 — SIGNIFICANT CHANGE UP
FLUAV AG NPH QL: SIGNIFICANT CHANGE UP
FLUAV SUBTYP SPEC NAA+PROBE: SIGNIFICANT CHANGE UP
FLUBV AG NPH QL: SIGNIFICANT CHANGE UP
FLUBV RNA SPEC QL NAA+PROBE: SIGNIFICANT CHANGE UP
GAS PNL BLDV: SIGNIFICANT CHANGE UP
GIANT PLATELETS BLD QL SMEAR: PRESENT — SIGNIFICANT CHANGE UP
GIANT PLATELETS BLD QL SMEAR: PRESENT — SIGNIFICANT CHANGE UP
GLUCOSE BLDC GLUCOMTR-MCNC: 129 MG/DL — HIGH (ref 70–99)
GLUCOSE BLDC GLUCOMTR-MCNC: 144 MG/DL — HIGH (ref 70–99)
GLUCOSE BLDC GLUCOMTR-MCNC: 148 MG/DL — HIGH (ref 70–99)
GLUCOSE BLDC GLUCOMTR-MCNC: 153 MG/DL — HIGH (ref 70–99)
GLUCOSE BLDC GLUCOMTR-MCNC: 168 MG/DL — HIGH (ref 70–99)
GLUCOSE BLDC GLUCOMTR-MCNC: 174 MG/DL — HIGH (ref 70–99)
GLUCOSE BLDC GLUCOMTR-MCNC: 232 MG/DL — HIGH (ref 70–99)
GLUCOSE BLDC GLUCOMTR-MCNC: 236 MG/DL — HIGH (ref 70–99)
GLUCOSE BLDC GLUCOMTR-MCNC: 61 MG/DL — LOW (ref 70–99)
GLUCOSE BLDC GLUCOMTR-MCNC: 70 MG/DL — SIGNIFICANT CHANGE UP (ref 70–99)
GLUCOSE SERPL-MCNC: 101 MG/DL — HIGH (ref 70–99)
GLUCOSE SERPL-MCNC: 113 MG/DL — HIGH (ref 70–99)
GLUCOSE SERPL-MCNC: 153 MG/DL — HIGH (ref 70–99)
GLUCOSE SERPL-MCNC: 165 MG/DL — HIGH (ref 70–99)
GLUCOSE SERPL-MCNC: 179 MG/DL — HIGH (ref 70–99)
GLUCOSE SERPL-MCNC: 191 MG/DL — HIGH (ref 70–99)
GLUCOSE SERPL-MCNC: 196 MG/DL — HIGH (ref 70–99)
GLUCOSE SERPL-MCNC: 210 MG/DL — HIGH (ref 70–99)
GLUCOSE SERPL-MCNC: 229 MG/DL — HIGH (ref 70–99)
GLUCOSE SERPL-MCNC: 252 MG/DL — HIGH (ref 70–99)
GLUCOSE SERPL-MCNC: 92 MG/DL — SIGNIFICANT CHANGE UP (ref 70–99)
GLUCOSE UR QL: NEGATIVE MG/DL — SIGNIFICANT CHANGE UP
GRAM STN FLD: ABNORMAL
HADV DNA SPEC QL NAA+PROBE: SIGNIFICANT CHANGE UP
HCOV 229E RNA SPEC QL NAA+PROBE: SIGNIFICANT CHANGE UP
HCOV HKU1 RNA SPEC QL NAA+PROBE: SIGNIFICANT CHANGE UP
HCOV NL63 RNA SPEC QL NAA+PROBE: SIGNIFICANT CHANGE UP
HCOV OC43 RNA SPEC QL NAA+PROBE: SIGNIFICANT CHANGE UP
HCT VFR BLD CALC: 30.5 % — LOW (ref 34.5–45)
HCT VFR BLD CALC: 31.7 % — LOW (ref 34.5–45)
HCT VFR BLD CALC: 31.8 % — LOW (ref 34.5–45)
HCT VFR BLD CALC: 32.2 % — LOW (ref 34.5–45)
HCT VFR BLD CALC: 36.8 % — SIGNIFICANT CHANGE UP (ref 34.5–45)
HCT VFR BLD CALC: 39.3 % — SIGNIFICANT CHANGE UP (ref 34.5–45)
HCT VFR BLD CALC: 40.7 % — SIGNIFICANT CHANGE UP (ref 34.5–45)
HCT VFR BLD CALC: 44.4 % — SIGNIFICANT CHANGE UP (ref 34.5–45)
HEPARIN-PF4 AB RESULT: <0.6 U/ML — SIGNIFICANT CHANGE UP (ref 0–0.9)
HGB BLD-MCNC: 10.2 G/DL — LOW (ref 11.5–15.5)
HGB BLD-MCNC: 10.2 G/DL — LOW (ref 11.5–15.5)
HGB BLD-MCNC: 10.7 G/DL — LOW (ref 11.5–15.5)
HGB BLD-MCNC: 10.9 G/DL — LOW (ref 11.5–15.5)
HGB BLD-MCNC: 11.8 G/DL — SIGNIFICANT CHANGE UP (ref 11.5–15.5)
HGB BLD-MCNC: 13.1 G/DL — SIGNIFICANT CHANGE UP (ref 11.5–15.5)
HGB BLD-MCNC: 13.8 G/DL — SIGNIFICANT CHANGE UP (ref 11.5–15.5)
HGB BLD-MCNC: 13.8 G/DL — SIGNIFICANT CHANGE UP (ref 11.5–15.5)
HMPV RNA SPEC QL NAA+PROBE: SIGNIFICANT CHANGE UP
HPIV1 RNA SPEC QL NAA+PROBE: SIGNIFICANT CHANGE UP
HPIV2 RNA SPEC QL NAA+PROBE: SIGNIFICANT CHANGE UP
HPIV3 RNA SPEC QL NAA+PROBE: SIGNIFICANT CHANGE UP
HPIV4 RNA SPEC QL NAA+PROBE: SIGNIFICANT CHANGE UP
IANC: 11.56 K/UL — HIGH (ref 1.8–7.4)
IANC: 27.51 K/UL — HIGH (ref 1.8–7.4)
IMM GRANULOCYTES NFR BLD AUTO: 0.7 % — SIGNIFICANT CHANGE UP (ref 0–0.9)
IMM GRANULOCYTES NFR BLD AUTO: 0.9 % — SIGNIFICANT CHANGE UP (ref 0–0.9)
IMM GRANULOCYTES NFR BLD AUTO: 3.4 % — HIGH (ref 0–0.9)
INR BLD: 0.95 RATIO — SIGNIFICANT CHANGE UP (ref 0.85–1.16)
INR BLD: 1.19 RATIO — HIGH (ref 0.85–1.16)
INR BLD: 1.25 RATIO — HIGH (ref 0.85–1.16)
INR BLD: 1.37 RATIO — HIGH (ref 0.85–1.16)
INR BLD: 1.4 RATIO — HIGH (ref 0.85–1.16)
INR BLD: 1.42 RATIO — HIGH (ref 0.85–1.16)
INR BLD: 1.58 RATIO — HIGH (ref 0.85–1.16)
KETONES UR-MCNC: ABNORMAL MG/DL
LACTATE BLDV-MCNC: 4.2 MMOL/L — CRITICAL HIGH (ref 0.5–2)
LEUKOCYTE ESTERASE UR-ACNC: ABNORMAL
LYMPHOCYTES # BLD AUTO: 0.88 K/UL — LOW (ref 1–3.3)
LYMPHOCYTES # BLD AUTO: 1.1 K/UL — SIGNIFICANT CHANGE UP (ref 1–3.3)
LYMPHOCYTES # BLD AUTO: 1.31 K/UL — SIGNIFICANT CHANGE UP (ref 1–3.3)
LYMPHOCYTES # BLD AUTO: 1.74 K/UL — SIGNIFICANT CHANGE UP (ref 1–3.3)
LYMPHOCYTES # BLD AUTO: 16.4 % — SIGNIFICANT CHANGE UP (ref 13–44)
LYMPHOCYTES # BLD AUTO: 2.21 K/UL — SIGNIFICANT CHANGE UP (ref 1–3.3)
LYMPHOCYTES # BLD AUTO: 3.5 % — LOW (ref 13–44)
LYMPHOCYTES # BLD AUTO: 4.4 % — LOW (ref 13–44)
LYMPHOCYTES # BLD AUTO: 5.6 % — LOW (ref 13–44)
LYMPHOCYTES # BLD AUTO: 8.8 % — LOW (ref 13–44)
M PNEUMO DNA SPEC QL NAA+PROBE: SIGNIFICANT CHANGE UP
MAGNESIUM SERPL-MCNC: 1.7 MG/DL — SIGNIFICANT CHANGE UP (ref 1.6–2.6)
MAGNESIUM SERPL-MCNC: 1.8 MG/DL — SIGNIFICANT CHANGE UP (ref 1.6–2.6)
MAGNESIUM SERPL-MCNC: 1.9 MG/DL — SIGNIFICANT CHANGE UP (ref 1.6–2.6)
MANUAL SMEAR VERIFICATION: SIGNIFICANT CHANGE UP
MCHC RBC-ENTMCNC: 24.2 PG — LOW (ref 27–34)
MCHC RBC-ENTMCNC: 24.4 PG — LOW (ref 27–34)
MCHC RBC-ENTMCNC: 24.8 PG — LOW (ref 27–34)
MCHC RBC-ENTMCNC: 25.1 PG — LOW (ref 27–34)
MCHC RBC-ENTMCNC: 25.3 PG — LOW (ref 27–34)
MCHC RBC-ENTMCNC: 25.4 PG — LOW (ref 27–34)
MCHC RBC-ENTMCNC: 25.7 PG — LOW (ref 27–34)
MCHC RBC-ENTMCNC: 26.6 PG — LOW (ref 27–34)
MCHC RBC-ENTMCNC: 31.1 G/DL — LOW (ref 32–36)
MCHC RBC-ENTMCNC: 32.1 G/DL — SIGNIFICANT CHANGE UP (ref 32–36)
MCHC RBC-ENTMCNC: 32.1 G/DL — SIGNIFICANT CHANGE UP (ref 32–36)
MCHC RBC-ENTMCNC: 32.2 G/DL — SIGNIFICANT CHANGE UP (ref 32–36)
MCHC RBC-ENTMCNC: 33.2 G/DL — SIGNIFICANT CHANGE UP (ref 32–36)
MCHC RBC-ENTMCNC: 33.4 G/DL — SIGNIFICANT CHANGE UP (ref 32–36)
MCHC RBC-ENTMCNC: 34.4 G/DL — SIGNIFICANT CHANGE UP (ref 32–36)
MCHC RBC-ENTMCNC: 35.1 G/DL — SIGNIFICANT CHANGE UP (ref 32–36)
MCV RBC AUTO: 72.2 FL — LOW (ref 80–100)
MCV RBC AUTO: 73.5 FL — LOW (ref 80–100)
MCV RBC AUTO: 73.5 FL — LOW (ref 80–100)
MCV RBC AUTO: 74.2 FL — LOW (ref 80–100)
MCV RBC AUTO: 75.5 FL — LOW (ref 80–100)
MCV RBC AUTO: 80 FL — SIGNIFICANT CHANGE UP (ref 80–100)
MCV RBC AUTO: 80.9 FL — SIGNIFICANT CHANGE UP (ref 80–100)
MCV RBC AUTO: 83.1 FL — SIGNIFICANT CHANGE UP (ref 80–100)
METAMYELOCYTES # FLD: 0.9 % — SIGNIFICANT CHANGE UP (ref 0–1)
METAMYELOCYTES NFR BLD: 0.9 % — SIGNIFICANT CHANGE UP (ref 0–1)
METHOD TYPE: SIGNIFICANT CHANGE UP
MICROCYTES BLD QL: SLIGHT — SIGNIFICANT CHANGE UP
MONOCYTES # BLD AUTO: 0.12 K/UL — SIGNIFICANT CHANGE UP (ref 0–0.9)
MONOCYTES # BLD AUTO: 0.53 K/UL — SIGNIFICANT CHANGE UP (ref 0–0.9)
MONOCYTES # BLD AUTO: 0.56 K/UL — SIGNIFICANT CHANGE UP (ref 0–0.9)
MONOCYTES # BLD AUTO: 0.69 K/UL — SIGNIFICANT CHANGE UP (ref 0–0.9)
MONOCYTES # BLD AUTO: 0.89 K/UL — SIGNIFICANT CHANGE UP (ref 0–0.9)
MONOCYTES NFR BLD AUTO: 0.9 % — LOW (ref 2–14)
MONOCYTES NFR BLD AUTO: 1.7 % — LOW (ref 2–14)
MONOCYTES NFR BLD AUTO: 1.8 % — LOW (ref 2–14)
MONOCYTES NFR BLD AUTO: 3.5 % — SIGNIFICANT CHANGE UP (ref 2–14)
MONOCYTES NFR BLD AUTO: 6 % — SIGNIFICANT CHANGE UP (ref 2–14)
MRSA PCR RESULT.: SIGNIFICANT CHANGE UP
NEUTROPHILS # BLD AUTO: 11.01 K/UL — HIGH (ref 1.8–7.4)
NEUTROPHILS # BLD AUTO: 12.41 K/UL — HIGH (ref 1.8–7.4)
NEUTROPHILS # BLD AUTO: 18.02 K/UL — HIGH (ref 1.8–7.4)
NEUTROPHILS # BLD AUTO: 27.51 K/UL — HIGH (ref 1.8–7.4)
NEUTROPHILS # BLD AUTO: 29.8 K/UL — HIGH (ref 1.8–7.4)
NEUTROPHILS NFR BLD AUTO: 71.5 % — SIGNIFICANT CHANGE UP (ref 43–77)
NEUTROPHILS NFR BLD AUTO: 83.6 % — HIGH (ref 43–77)
NEUTROPHILS NFR BLD AUTO: 88.4 % — HIGH (ref 43–77)
NEUTROPHILS NFR BLD AUTO: 91 % — HIGH (ref 43–77)
NEUTROPHILS NFR BLD AUTO: 92.2 % — HIGH (ref 43–77)
NEUTS BAND # BLD: 10.3 % — CRITICAL HIGH (ref 0–6)
NEUTS BAND # BLD: 2.6 % — SIGNIFICANT CHANGE UP (ref 0–6)
NEUTS BAND NFR BLD: 10.3 % — CRITICAL HIGH (ref 0–6)
NEUTS BAND NFR BLD: 2.6 % — SIGNIFICANT CHANGE UP (ref 0–6)
NITRITE UR-MCNC: NEGATIVE — SIGNIFICANT CHANGE UP
NRBC # BLD AUTO: 0 K/UL — SIGNIFICANT CHANGE UP (ref 0–0)
NRBC # BLD AUTO: 0 K/UL — SIGNIFICANT CHANGE UP (ref 0–0)
NRBC # BLD AUTO: 0.02 K/UL — HIGH (ref 0–0)
NRBC # BLD AUTO: 0.02 K/UL — HIGH (ref 0–0)
NRBC # FLD: 0 K/UL — SIGNIFICANT CHANGE UP (ref 0–0)
NRBC # FLD: 0 K/UL — SIGNIFICANT CHANGE UP (ref 0–0)
NRBC # FLD: 0.02 K/UL — HIGH (ref 0–0)
NRBC # FLD: 0.02 K/UL — HIGH (ref 0–0)
NRBC BLD AUTO-RTO: 0 /100 WBCS — SIGNIFICANT CHANGE UP (ref 0–0)
NT-PROBNP SERPL-SCNC: HIGH PG/ML
OSMOLALITY SERPL: 289 MOSM/KG — SIGNIFICANT CHANGE UP (ref 275–295)
OSMOLALITY UR: 416 MOSM/KG — SIGNIFICANT CHANGE UP (ref 50–1200)
PF4 HEPARIN CMPLX AB SER-ACNC: NEGATIVE — SIGNIFICANT CHANGE UP
PH UR: 5.5 — SIGNIFICANT CHANGE UP (ref 5–8)
PHOSPHATE SERPL-MCNC: 4.4 MG/DL — SIGNIFICANT CHANGE UP (ref 2.5–4.5)
PHOSPHATE SERPL-MCNC: 4.5 MG/DL — SIGNIFICANT CHANGE UP (ref 2.5–4.5)
PHOSPHATE SERPL-MCNC: 4.8 MG/DL — HIGH (ref 2.5–4.5)
PHOSPHATE SERPL-MCNC: 4.9 MG/DL — HIGH (ref 2.5–4.5)
PHOSPHATE SERPL-MCNC: 4.9 MG/DL — HIGH (ref 2.5–4.5)
PHOSPHATE SERPL-MCNC: 5.1 MG/DL — HIGH (ref 2.5–4.5)
PHOSPHATE SERPL-MCNC: 5.6 MG/DL — HIGH (ref 2.5–4.5)
PHOSPHATE SERPL-MCNC: 5.6 MG/DL — HIGH (ref 2.5–4.5)
PHOSPHATE SERPL-MCNC: 5.8 MG/DL — HIGH (ref 2.5–4.5)
PHOSPHATE SERPL-MCNC: 6.2 MG/DL — HIGH (ref 2.5–4.5)
PLAT MORPH BLD: ABNORMAL
PLAT MORPH BLD: NORMAL — SIGNIFICANT CHANGE UP
PLATELET # BLD AUTO: 144 K/UL — LOW (ref 150–400)
PLATELET # BLD AUTO: 144 K/UL — LOW (ref 150–400)
PLATELET # BLD AUTO: 168 K/UL — SIGNIFICANT CHANGE UP (ref 150–400)
PLATELET # BLD AUTO: 206 K/UL — SIGNIFICANT CHANGE UP (ref 150–400)
PLATELET # BLD AUTO: 344 K/UL — SIGNIFICANT CHANGE UP (ref 150–400)
PLATELET # BLD AUTO: 418 K/UL — HIGH (ref 150–400)
PLATELET # BLD AUTO: 487 K/UL — HIGH (ref 150–400)
PLATELET # BLD AUTO: 598 K/UL — HIGH (ref 150–400)
PLATELET COUNT - ESTIMATE: NORMAL — SIGNIFICANT CHANGE UP
PLATELET COUNT - ESTIMATE: NORMAL — SIGNIFICANT CHANGE UP
PMV BLD: 10.4 FL — SIGNIFICANT CHANGE UP (ref 7–13)
PMV BLD: 10.7 FL — SIGNIFICANT CHANGE UP (ref 7–13)
PMV BLD: 9.8 FL — SIGNIFICANT CHANGE UP (ref 7–13)
POIKILOCYTOSIS BLD QL AUTO: SIGNIFICANT CHANGE UP
POIKILOCYTOSIS BLD QL AUTO: SLIGHT — SIGNIFICANT CHANGE UP
POLYCHROMASIA BLD QL SMEAR: SLIGHT — SIGNIFICANT CHANGE UP
POLYCHROMASIA BLD QL SMEAR: SLIGHT — SIGNIFICANT CHANGE UP
POTASSIUM SERPL-MCNC: 3.8 MMOL/L — SIGNIFICANT CHANGE UP (ref 3.5–5.3)
POTASSIUM SERPL-MCNC: 4 MMOL/L — SIGNIFICANT CHANGE UP (ref 3.5–5.3)
POTASSIUM SERPL-MCNC: 4.1 MMOL/L — SIGNIFICANT CHANGE UP (ref 3.5–5.3)
POTASSIUM SERPL-MCNC: 4.4 MMOL/L — SIGNIFICANT CHANGE UP (ref 3.5–5.3)
POTASSIUM SERPL-MCNC: 4.5 MMOL/L — SIGNIFICANT CHANGE UP (ref 3.5–5.3)
POTASSIUM SERPL-MCNC: 5 MMOL/L — SIGNIFICANT CHANGE UP (ref 3.5–5.3)
POTASSIUM SERPL-MCNC: 5 MMOL/L — SIGNIFICANT CHANGE UP (ref 3.5–5.3)
POTASSIUM SERPL-MCNC: 5.2 MMOL/L — SIGNIFICANT CHANGE UP (ref 3.5–5.3)
POTASSIUM SERPL-MCNC: 5.2 MMOL/L — SIGNIFICANT CHANGE UP (ref 3.5–5.3)
POTASSIUM SERPL-MCNC: 5.5 MMOL/L — HIGH (ref 3.5–5.3)
POTASSIUM SERPL-MCNC: 5.5 MMOL/L — HIGH (ref 3.5–5.3)
POTASSIUM SERPL-SCNC: 3.8 MMOL/L — SIGNIFICANT CHANGE UP (ref 3.5–5.3)
POTASSIUM SERPL-SCNC: 4 MMOL/L — SIGNIFICANT CHANGE UP (ref 3.5–5.3)
POTASSIUM SERPL-SCNC: 4.1 MMOL/L — SIGNIFICANT CHANGE UP (ref 3.5–5.3)
POTASSIUM SERPL-SCNC: 4.4 MMOL/L — SIGNIFICANT CHANGE UP (ref 3.5–5.3)
POTASSIUM SERPL-SCNC: 4.5 MMOL/L — SIGNIFICANT CHANGE UP (ref 3.5–5.3)
POTASSIUM SERPL-SCNC: 5 MMOL/L — SIGNIFICANT CHANGE UP (ref 3.5–5.3)
POTASSIUM SERPL-SCNC: 5 MMOL/L — SIGNIFICANT CHANGE UP (ref 3.5–5.3)
POTASSIUM SERPL-SCNC: 5.2 MMOL/L — SIGNIFICANT CHANGE UP (ref 3.5–5.3)
POTASSIUM SERPL-SCNC: 5.2 MMOL/L — SIGNIFICANT CHANGE UP (ref 3.5–5.3)
POTASSIUM SERPL-SCNC: 5.5 MMOL/L — HIGH (ref 3.5–5.3)
POTASSIUM SERPL-SCNC: 5.5 MMOL/L — HIGH (ref 3.5–5.3)
PROCALCITONIN SERPL-MCNC: > 100 NG/ML — SIGNIFICANT CHANGE UP (ref 0.02–0.1)
PROCALCITONIN SERPL-MCNC: > 100 NG/ML — SIGNIFICANT CHANGE UP (ref 0.02–0.1)
PROCALCITONIN SERPL-MCNC: >100 NG/ML — HIGH (ref 0.02–0.1)
PROCALCITONIN SERPL-MCNC: >100 NG/ML — HIGH (ref 0.02–0.1)
PROT SERPL-MCNC: 3.9 G/DL — LOW (ref 6–8.3)
PROT SERPL-MCNC: 4.1 G/DL — LOW (ref 6–8.3)
PROT SERPL-MCNC: 4.2 G/DL — LOW (ref 6–8.3)
PROT SERPL-MCNC: 4.2 G/DL — LOW (ref 6–8.3)
PROT SERPL-MCNC: 4.4 G/DL — LOW (ref 6–8.3)
PROT SERPL-MCNC: 4.7 G/DL — LOW (ref 6–8.3)
PROT SERPL-MCNC: 4.9 G/DL — LOW (ref 6–8.3)
PROT SERPL-MCNC: 5 G/DL — LOW (ref 6–8.3)
PROT SERPL-MCNC: 6.4 G/DL — SIGNIFICANT CHANGE UP (ref 6–8.3)
PROT UR-MCNC: 30 MG/DL
PROTHROM AB SERPL-ACNC: 10.9 SEC — SIGNIFICANT CHANGE UP (ref 9.9–13.4)
PROTHROM AB SERPL-ACNC: 14.1 SEC — HIGH (ref 9.9–13.4)
PROTHROM AB SERPL-ACNC: 14.9 SEC — HIGH (ref 9.9–13.4)
PROTHROM AB SERPL-ACNC: 15.8 SEC — HIGH (ref 9.9–13.4)
PROTHROM AB SERPL-ACNC: 16.2 SEC — HIGH (ref 9.9–13.4)
PROTHROM AB SERPL-ACNC: 16.8 SEC — HIGH (ref 9.9–13.4)
PROTHROM AB SERPL-ACNC: 18.2 SEC — HIGH (ref 9.9–13.4)
RAPID RVP RESULT: SIGNIFICANT CHANGE UP
RBC # BLD: 4.11 M/UL — SIGNIFICANT CHANGE UP (ref 3.8–5.2)
RBC # BLD: 4.21 M/UL — SIGNIFICANT CHANGE UP (ref 3.8–5.2)
RBC # BLD: 4.31 M/UL — SIGNIFICANT CHANGE UP (ref 3.8–5.2)
RBC # BLD: 4.38 M/UL — SIGNIFICANT CHANGE UP (ref 3.8–5.2)
RBC # BLD: 4.43 M/UL — SIGNIFICANT CHANGE UP (ref 3.8–5.2)
RBC # BLD: 5.09 M/UL — SIGNIFICANT CHANGE UP (ref 3.8–5.2)
RBC # BLD: 5.44 M/UL — HIGH (ref 3.8–5.2)
RBC # BLD: 5.49 M/UL — HIGH (ref 3.8–5.2)
RBC # FLD: 14.6 % — HIGH (ref 10.3–14.5)
RBC # FLD: 14.9 % — HIGH (ref 10.3–14.5)
RBC # FLD: 15.4 % — HIGH (ref 10.3–14.5)
RBC # FLD: 15.7 % — HIGH (ref 10.3–14.5)
RBC # FLD: 16.6 % — HIGH (ref 10.3–14.5)
RBC # FLD: 17.2 % — HIGH (ref 10.3–14.5)
RBC BLD AUTO: ABNORMAL
RBC BLD AUTO: SIGNIFICANT CHANGE UP
RBC CASTS # UR COMP ASSIST: 12 /HPF — HIGH (ref 0–4)
REVIEW: SIGNIFICANT CHANGE UP
RH IG SCN BLD-IMP: POSITIVE — SIGNIFICANT CHANGE UP
RSV RNA NPH QL NAA+NON-PROBE: SIGNIFICANT CHANGE UP
RSV RNA SPEC QL NAA+PROBE: SIGNIFICANT CHANGE UP
RV+EV RNA SPEC QL NAA+PROBE: SIGNIFICANT CHANGE UP
S AUREUS DNA NOSE QL NAA+PROBE: DETECTED
SARS-COV-2 RNA SPEC QL NAA+PROBE: SIGNIFICANT CHANGE UP
SARS-COV-2 RNA SPEC QL NAA+PROBE: SIGNIFICANT CHANGE UP
SMUDGE CELLS # BLD: PRESENT — SIGNIFICANT CHANGE UP
SODIUM SERPL-SCNC: 125 MMOL/L — LOW (ref 135–145)
SODIUM SERPL-SCNC: 126 MMOL/L — LOW (ref 135–145)
SODIUM SERPL-SCNC: 126 MMOL/L — LOW (ref 135–145)
SODIUM SERPL-SCNC: 127 MMOL/L — LOW (ref 135–145)
SODIUM SERPL-SCNC: 127 MMOL/L — LOW (ref 135–145)
SODIUM SERPL-SCNC: 128 MMOL/L — LOW (ref 135–145)
SODIUM SERPL-SCNC: 130 MMOL/L — LOW (ref 135–145)
SODIUM SERPL-SCNC: 130 MMOL/L — LOW (ref 135–145)
SODIUM SERPL-SCNC: 131 MMOL/L — LOW (ref 135–145)
SODIUM SERPL-SCNC: 132 MMOL/L — LOW (ref 135–145)
SODIUM SERPL-SCNC: 134 MMOL/L — LOW (ref 135–145)
SOURCE RESPIRATORY: SIGNIFICANT CHANGE UP
SP GR SPEC: 1.02 — SIGNIFICANT CHANGE UP (ref 1–1.03)
SPECIMEN SOURCE: SIGNIFICANT CHANGE UP
SQUAMOUS # UR AUTO: 8 /HPF — HIGH (ref 0–5)
T3 SERPL-MCNC: 106 NG/DL — SIGNIFICANT CHANGE UP (ref 80–200)
T4 AB SER-ACNC: 6.2 UG/DL — SIGNIFICANT CHANGE UP (ref 5.1–13)
T4 FREE SERPL-MCNC: 0.4 NG/DL — LOW (ref 0.9–1.8)
T4 FREE SERPL-MCNC: 0.4 NG/DL — LOW (ref 0.9–1.8)
TARGETS BLD QL SMEAR: SLIGHT — SIGNIFICANT CHANGE UP
TROPONIN T, HIGH SENSITIVITY RESULT: 101 NG/L — CRITICAL HIGH
TROPONIN T, HIGH SENSITIVITY RESULT: 97 NG/L — CRITICAL HIGH
TSH SERPL-MCNC: 7.29 UIU/ML — HIGH (ref 0.27–4.2)
URATE SERPL-MCNC: 4.7 MG/DL — SIGNIFICANT CHANGE UP (ref 2.5–7)
UROBILINOGEN FLD QL: 1 MG/DL — SIGNIFICANT CHANGE UP (ref 0.2–1)
WBC # BLD: 13.46 K/UL — HIGH (ref 3.8–10.5)
WBC # BLD: 14.85 K/UL — HIGH (ref 3.8–10.5)
WBC # BLD: 19.79 K/UL — HIGH (ref 3.8–10.5)
WBC # BLD: 26.56 K/UL — HIGH (ref 3.8–10.5)
WBC # BLD: 29.01 K/UL — HIGH (ref 3.8–10.5)
WBC # BLD: 30.35 K/UL — HIGH (ref 3.8–10.5)
WBC # BLD: 31.11 K/UL — HIGH (ref 3.8–10.5)
WBC # BLD: 31.43 K/UL — HIGH (ref 3.8–10.5)
WBC # FLD AUTO: 13.46 K/UL — HIGH (ref 3.8–10.5)
WBC # FLD AUTO: 14.85 K/UL — HIGH (ref 3.8–10.5)
WBC # FLD AUTO: 19.79 K/UL — HIGH (ref 3.8–10.5)
WBC # FLD AUTO: 26.56 K/UL — HIGH (ref 3.8–10.5)
WBC # FLD AUTO: 29.01 K/UL — HIGH (ref 3.8–10.5)
WBC # FLD AUTO: 30.35 K/UL — HIGH (ref 3.8–10.5)
WBC # FLD AUTO: 31.11 K/UL — HIGH (ref 3.8–10.5)
WBC # FLD AUTO: 31.43 K/UL — HIGH (ref 3.8–10.5)
WBC UR QL: 18 /HPF — HIGH (ref 0–5)

## 2025-01-01 PROCEDURE — G0545: CPT

## 2025-01-01 PROCEDURE — 93306 TTE W/DOPPLER COMPLETE: CPT | Mod: 26

## 2025-01-01 PROCEDURE — 36620 INSERTION CATHETER ARTERY: CPT

## 2025-01-01 PROCEDURE — 96360 HYDRATION IV INFUSION INIT: CPT

## 2025-01-01 PROCEDURE — 85025 COMPLETE CBC W/AUTO DIFF WBC: CPT

## 2025-01-01 PROCEDURE — 82330 ASSAY OF CALCIUM: CPT

## 2025-01-01 PROCEDURE — 71250 CT THORAX DX C-: CPT

## 2025-01-01 PROCEDURE — 85610 PROTHROMBIN TIME: CPT

## 2025-01-01 PROCEDURE — 99291 CRITICAL CARE FIRST HOUR: CPT | Mod: 25

## 2025-01-01 PROCEDURE — 99291 CRITICAL CARE FIRST HOUR: CPT

## 2025-01-01 PROCEDURE — 87086 URINE CULTURE/COLONY COUNT: CPT

## 2025-01-01 PROCEDURE — 93931 UPPER EXTREMITY STUDY: CPT | Mod: 26,RT

## 2025-01-01 PROCEDURE — 81001 URINALYSIS AUTO W/SCOPE: CPT

## 2025-01-01 PROCEDURE — 36415 COLL VENOUS BLD VENIPUNCTURE: CPT

## 2025-01-01 PROCEDURE — 84295 ASSAY OF SERUM SODIUM: CPT

## 2025-01-01 PROCEDURE — 71250 CT THORAX DX C-: CPT | Mod: 26

## 2025-01-01 PROCEDURE — 85018 HEMOGLOBIN: CPT

## 2025-01-01 PROCEDURE — 71046 X-RAY EXAM CHEST 2 VIEWS: CPT

## 2025-01-01 PROCEDURE — 86901 BLOOD TYPING SEROLOGIC RH(D): CPT

## 2025-01-01 PROCEDURE — 86301 IMMUNOASSAY TUMOR CA 19-9: CPT

## 2025-01-01 PROCEDURE — 99232 SBSQ HOSP IP/OBS MODERATE 35: CPT

## 2025-01-01 PROCEDURE — 82803 BLOOD GASES ANY COMBINATION: CPT

## 2025-01-01 PROCEDURE — 99222 1ST HOSP IP/OBS MODERATE 55: CPT

## 2025-01-01 PROCEDURE — 85730 THROMBOPLASTIN TIME PARTIAL: CPT

## 2025-01-01 PROCEDURE — 36556 INSERT NON-TUNNEL CV CATH: CPT | Mod: GC

## 2025-01-01 PROCEDURE — 71045 X-RAY EXAM CHEST 1 VIEW: CPT | Mod: 26

## 2025-01-01 PROCEDURE — 82378 CARCINOEMBRYONIC ANTIGEN: CPT

## 2025-01-01 PROCEDURE — 71046 X-RAY EXAM CHEST 2 VIEWS: CPT | Mod: 26

## 2025-01-01 PROCEDURE — 88305 TISSUE EXAM BY PATHOLOGIST: CPT

## 2025-01-01 PROCEDURE — 85014 HEMATOCRIT: CPT

## 2025-01-01 PROCEDURE — 93970 EXTREMITY STUDY: CPT | Mod: 26

## 2025-01-01 PROCEDURE — 84132 ASSAY OF SERUM POTASSIUM: CPT

## 2025-01-01 PROCEDURE — 82435 ASSAY OF BLOOD CHLORIDE: CPT

## 2025-01-01 PROCEDURE — 49083 ABD PARACENTESIS W/IMAGING: CPT

## 2025-01-01 PROCEDURE — 74177 CT ABD & PELVIS W/CONTRAST: CPT | Mod: 26

## 2025-01-01 PROCEDURE — 80053 COMPREHEN METABOLIC PANEL: CPT

## 2025-01-01 PROCEDURE — 88112 CYTOPATH CELL ENHANCE TECH: CPT

## 2025-01-01 PROCEDURE — 88341 IMHCHEM/IMCYTCHM EA ADD ANTB: CPT

## 2025-01-01 PROCEDURE — 99291 CRITICAL CARE FIRST HOUR: CPT | Mod: GC,25

## 2025-01-01 PROCEDURE — 99233 SBSQ HOSP IP/OBS HIGH 50: CPT | Mod: GC

## 2025-01-01 PROCEDURE — 99254 IP/OBS CNSLTJ NEW/EST MOD 60: CPT

## 2025-01-01 PROCEDURE — 82947 ASSAY GLUCOSE BLOOD QUANT: CPT

## 2025-01-01 PROCEDURE — 88342 IMHCHEM/IMCYTCHM 1ST ANTB: CPT

## 2025-01-01 PROCEDURE — 86850 RBC ANTIBODY SCREEN: CPT

## 2025-01-01 PROCEDURE — 99255 IP/OBS CONSLTJ NEW/EST HI 80: CPT | Mod: GC

## 2025-01-01 PROCEDURE — 71045 X-RAY EXAM CHEST 1 VIEW: CPT | Mod: 26,77

## 2025-01-01 PROCEDURE — 76770 US EXAM ABDO BACK WALL COMP: CPT | Mod: 26

## 2025-01-01 PROCEDURE — 83605 ASSAY OF LACTIC ACID: CPT

## 2025-01-01 PROCEDURE — 86900 BLOOD TYPING SEROLOGIC ABO: CPT

## 2025-01-01 PROCEDURE — 99254 IP/OBS CNSLTJ NEW/EST MOD 60: CPT | Mod: GC

## 2025-01-01 PROCEDURE — 99285 EMERGENCY DEPT VISIT HI MDM: CPT | Mod: 25

## 2025-01-01 PROCEDURE — 86304 IMMUNOASSAY TUMOR CA 125: CPT

## 2025-01-01 PROCEDURE — 99285 EMERGENCY DEPT VISIT HI MDM: CPT

## 2025-01-01 RX ORDER — TELMISARTAN 20 MG/1
0 TABLET ORAL
Qty: 0 | Refills: 0 | DISCHARGE
Start: 2025-01-01

## 2025-01-01 RX ORDER — CEFEPIME 2 G/20ML
1000 INJECTION, POWDER, FOR SOLUTION INTRAVENOUS ONCE
Refills: 0 | Status: COMPLETED | OUTPATIENT
Start: 2025-01-01 | End: 2025-01-01

## 2025-01-01 RX ORDER — FUROSEMIDE 10 MG/ML
40 INJECTION INTRAMUSCULAR; INTRAVENOUS ONCE
Refills: 0 | Status: COMPLETED | OUTPATIENT
Start: 2025-01-01 | End: 2025-01-01

## 2025-01-01 RX ORDER — PIPERACILLIN-TAZO-DEXTROSE,ISO 3.375G/5
3.38 IV SOLUTION, PIGGYBACK PREMIX FROZEN(ML) INTRAVENOUS EVERY 8 HOURS
Refills: 0 | Status: DISCONTINUED | OUTPATIENT
Start: 2025-01-01 | End: 2025-01-01

## 2025-01-01 RX ORDER — ARGATROBAN 100 MG/ML
0.3 INJECTION, SOLUTION INTRAVENOUS
Qty: 50 | Refills: 0 | Status: DISCONTINUED | OUTPATIENT
Start: 2025-01-01 | End: 2025-01-01

## 2025-01-01 RX ORDER — FUROSEMIDE 10 MG/ML
20 INJECTION INTRAMUSCULAR; INTRAVENOUS ONCE
Refills: 0 | Status: COMPLETED | OUTPATIENT
Start: 2025-01-01 | End: 2025-01-01

## 2025-01-01 RX ORDER — SENNA 187 MG
2 TABLET ORAL AT BEDTIME
Refills: 0 | Status: DISCONTINUED | OUTPATIENT
Start: 2025-01-01 | End: 2025-01-01

## 2025-01-01 RX ORDER — VASOPRESSIN 20 [USP'U]/ML
0.04 INJECTION INTRAVENOUS
Qty: 40 | Refills: 0 | Status: DISCONTINUED | OUTPATIENT
Start: 2025-01-01 | End: 2025-01-01

## 2025-01-01 RX ORDER — MUPIROCIN CALCIUM 20 MG/G
1 CREAM TOPICAL
Refills: 0 | Status: DISCONTINUED | OUTPATIENT
Start: 2025-01-01 | End: 2025-01-01

## 2025-01-01 RX ORDER — HYDROMORPHONE/SOD CHLOR,ISO/PF 2 MG/10 ML
1 SYRINGE (ML) INJECTION
Qty: 100 | Refills: 0 | Status: DISCONTINUED | OUTPATIENT
Start: 2025-01-01 | End: 2025-01-01

## 2025-01-01 RX ORDER — NOREPINEPHRINE BITARTRATE 8 MG
0.15 SOLUTION INTRAVENOUS
Qty: 16 | Refills: 0 | Status: DISCONTINUED | OUTPATIENT
Start: 2025-01-01 | End: 2025-01-01

## 2025-01-01 RX ORDER — LOPERAMIDE HCL 1 MG/7.5ML
2 SOLUTION ORAL
Refills: 0 | DISCHARGE

## 2025-01-01 RX ORDER — TELMISARTAN 20 MG/1
1 TABLET ORAL
Refills: 0 | DISCHARGE

## 2025-01-01 RX ORDER — DEXTROSE 50 % IN WATER 50 %
50 SYRINGE (ML) INTRAVENOUS ONCE
Refills: 0 | Status: COMPLETED | OUTPATIENT
Start: 2025-01-01 | End: 2025-01-01

## 2025-01-01 RX ORDER — HYDROMORPHONE/SOD CHLOR,ISO/PF 2 MG/10 ML
0.5 SYRINGE (ML) INJECTION
Refills: 0 | Status: DISCONTINUED | OUTPATIENT
Start: 2025-01-01 | End: 2025-01-01

## 2025-01-01 RX ORDER — SODIUM BICARBONATE 1 MEQ/ML
0.51 SYRINGE (ML) INTRAVENOUS
Qty: 150 | Refills: 0 | Status: DISCONTINUED | OUTPATIENT
Start: 2025-01-01 | End: 2025-01-01

## 2025-01-01 RX ORDER — HEPARIN SODIUM 1000 [USP'U]/ML
1500 INJECTION INTRAVENOUS; SUBCUTANEOUS EVERY 6 HOURS
Refills: 0 | Status: DISCONTINUED | OUTPATIENT
Start: 2025-01-01 | End: 2025-01-01

## 2025-01-01 RX ORDER — CAPECITABINE 500 MG/1
2 TABLET, FILM COATED ORAL
Refills: 0 | DISCHARGE

## 2025-01-01 RX ORDER — HYDROMORPHONE/SOD CHLOR,ISO/PF 2 MG/10 ML
0.5 SYRINGE (ML) INJECTION ONCE
Refills: 0 | Status: DISCONTINUED | OUTPATIENT
Start: 2025-01-01 | End: 2025-01-01

## 2025-01-01 RX ORDER — NITROGLYCERIN 20 MG/G
2 OINTMENT TOPICAL ONCE
Refills: 0 | Status: DISCONTINUED | OUTPATIENT
Start: 2025-01-01 | End: 2025-01-01

## 2025-01-01 RX ORDER — SODIUM CHLORIDE 9 G/1000ML
2000 INJECTION, SOLUTION INTRAVENOUS ONCE
Refills: 0 | Status: COMPLETED | OUTPATIENT
Start: 2025-01-01 | End: 2025-01-01

## 2025-01-01 RX ORDER — SODIUM BICARBONATE 1 MEQ/ML
50 SYRINGE (ML) INTRAVENOUS ONCE
Refills: 0 | Status: COMPLETED | OUTPATIENT
Start: 2025-01-01 | End: 2025-01-01

## 2025-01-01 RX ORDER — ONDANSETRON HCL/PF 4 MG/2 ML
1 VIAL (ML) INJECTION
Refills: 0 | DISCHARGE

## 2025-01-01 RX ORDER — HYDROCHLOROTHIAZIDE 50 MG/1
1 TABLET ORAL
Refills: 0 | DISCHARGE

## 2025-01-01 RX ORDER — NOREPINEPHRINE BITARTRATE 8 MG
0.19 SOLUTION INTRAVENOUS
Qty: 16 | Refills: 0 | Status: DISCONTINUED | OUTPATIENT
Start: 2025-01-01 | End: 2025-01-01

## 2025-01-01 RX ORDER — LIDOCAINE HYDROCHLORIDE 20 MG/ML
1 JELLY TOPICAL ONCE
Refills: 0 | Status: DISCONTINUED | OUTPATIENT
Start: 2025-01-01 | End: 2025-01-01

## 2025-01-01 RX ORDER — ALBUMIN (HUMAN) 12.5 G/50ML
250 INJECTION, SOLUTION INTRAVENOUS ONCE
Refills: 0 | Status: COMPLETED | OUTPATIENT
Start: 2025-01-01 | End: 2025-01-01

## 2025-01-01 RX ORDER — VANCOMYCIN HCL IN 5 % DEXTROSE 1.5G/250ML
1000 PLASTIC BAG, INJECTION (ML) INTRAVENOUS ONCE
Refills: 0 | Status: COMPLETED | OUTPATIENT
Start: 2025-01-01 | End: 2025-01-01

## 2025-01-01 RX ORDER — CALCIUM GLUCONATE 20 MG/ML
2 INJECTION, SOLUTION INTRAVENOUS ONCE
Refills: 0 | Status: COMPLETED | OUTPATIENT
Start: 2025-01-01 | End: 2025-01-01

## 2025-01-01 RX ORDER — LOPERAMIDE HCL 1 MG/7.5ML
0 SOLUTION ORAL
Qty: 0 | Refills: 0 | DISCHARGE
Start: 2025-01-01

## 2025-01-01 RX ORDER — BUMETANIDE 1 MG/1
2 TABLET ORAL ONCE
Refills: 0 | Status: COMPLETED | OUTPATIENT
Start: 2025-01-01 | End: 2025-01-01

## 2025-01-01 RX ORDER — HEPARIN SODIUM 1000 [USP'U]/ML
INJECTION INTRAVENOUS; SUBCUTANEOUS
Qty: 25000 | Refills: 0 | Status: DISCONTINUED | OUTPATIENT
Start: 2025-01-01 | End: 2025-01-01

## 2025-01-01 RX ORDER — CAPECITABINE 500 MG/1
0 TABLET, FILM COATED ORAL
Qty: 0 | Refills: 0 | DISCHARGE
Start: 2025-01-01

## 2025-01-01 RX ORDER — HYDROCORTISONE 20 MG
50 TABLET ORAL EVERY 6 HOURS
Refills: 0 | Status: DISCONTINUED | OUTPATIENT
Start: 2025-01-01 | End: 2025-01-01

## 2025-01-01 RX ORDER — OMEPRAZOLE 20 MG/1
0 CAPSULE, DELAYED RELEASE ORAL
Qty: 0 | Refills: 0 | DISCHARGE
Start: 2025-01-01

## 2025-01-01 RX ORDER — POLYETHYLENE GLYCOL 3350 17 G/17G
17 POWDER, FOR SOLUTION ORAL DAILY
Refills: 0 | Status: DISCONTINUED | OUTPATIENT
Start: 2025-01-01 | End: 2025-01-01

## 2025-01-01 RX ORDER — DICLOFENAC SODIUM 10 MG/G
2.25 GEL TOPICAL
Refills: 0 | DISCHARGE

## 2025-01-01 RX ORDER — GLYCOPYRROLATE 0.2 MG/ML
0.4 INJECTION INTRAMUSCULAR; INTRAVENOUS EVERY 6 HOURS
Refills: 0 | Status: DISCONTINUED | OUTPATIENT
Start: 2025-01-01 | End: 2025-01-01

## 2025-01-01 RX ORDER — GLYCOPYRROLATE 0.2 MG/ML
0.2 INJECTION INTRAMUSCULAR; INTRAVENOUS EVERY 6 HOURS
Refills: 0 | Status: DISCONTINUED | OUTPATIENT
Start: 2025-01-01 | End: 2025-01-01

## 2025-01-01 RX ORDER — SODIUM CHLORIDE 9 G/1000ML
500 INJECTION, SOLUTION INTRAVENOUS ONCE
Refills: 0 | Status: COMPLETED | OUTPATIENT
Start: 2025-01-01 | End: 2025-01-01

## 2025-01-01 RX ORDER — NITROGLYCERIN 20 MG/G
2 OINTMENT TOPICAL ONCE
Refills: 0 | Status: COMPLETED | OUTPATIENT
Start: 2025-01-01 | End: 2025-01-01

## 2025-01-01 RX ORDER — LORAZEPAM 4 MG/ML
2 VIAL (ML) INJECTION EVERY 4 HOURS
Refills: 0 | Status: DISCONTINUED | OUTPATIENT
Start: 2025-01-01 | End: 2025-01-01

## 2025-01-01 RX ORDER — HYDROCORTISONE 20 MG
50 TABLET ORAL EVERY 8 HOURS
Refills: 0 | Status: DISCONTINUED | OUTPATIENT
Start: 2025-01-01 | End: 2025-01-01

## 2025-01-01 RX ORDER — SODIUM CHLORIDE 9 G/1000ML
1000 INJECTION, SOLUTION INTRAVENOUS ONCE
Refills: 0 | Status: COMPLETED | OUTPATIENT
Start: 2025-01-01 | End: 2025-01-01

## 2025-01-01 RX ORDER — BUMETANIDE 1 MG/1
2 TABLET ORAL ONCE
Refills: 0 | Status: DISCONTINUED | OUTPATIENT
Start: 2025-01-01 | End: 2025-01-01

## 2025-01-01 RX ORDER — PHENTOLAMINE MESYLATE
5 POWDER (GRAM) MISCELLANEOUS ONCE
Refills: 0 | Status: COMPLETED | OUTPATIENT
Start: 2025-01-01 | End: 2025-01-01

## 2025-01-01 RX ORDER — NOREPINEPHRINE BITARTRATE 8 MG
0.05 SOLUTION INTRAVENOUS
Qty: 8 | Refills: 0 | Status: DISCONTINUED | OUTPATIENT
Start: 2025-01-01 | End: 2025-01-01

## 2025-01-01 RX ORDER — SODIUM CHLORIDE 9 G/1000ML
1000 INJECTION, SOLUTION INTRAVENOUS
Refills: 0 | Status: DISCONTINUED | OUTPATIENT
Start: 2025-01-01 | End: 2025-01-01

## 2025-01-01 RX ORDER — SODIUM ZIRCONIUM CYCLOSILICATE 5 G/5G
10 POWDER, FOR SUSPENSION ORAL ONCE
Refills: 0 | Status: COMPLETED | OUTPATIENT
Start: 2025-01-01 | End: 2025-01-01

## 2025-01-01 RX ORDER — BUMETANIDE 1 MG/1
4 TABLET ORAL
Qty: 20 | Refills: 0 | Status: DISCONTINUED | OUTPATIENT
Start: 2025-01-01 | End: 2025-01-01

## 2025-01-01 RX ORDER — ACETAMINOPHEN 500 MG/5ML
650 LIQUID (ML) ORAL ONCE
Refills: 0 | Status: COMPLETED | OUTPATIENT
Start: 2025-01-01 | End: 2025-01-01

## 2025-01-01 RX ORDER — HYDROCHLOROTHIAZIDE 50 MG/1
0 TABLET ORAL
Qty: 0 | Refills: 0 | DISCHARGE
Start: 2025-01-01

## 2025-01-01 RX ORDER — ONDANSETRON HCL/PF 4 MG/2 ML
1 VIAL (ML) INJECTION
Qty: 0 | Refills: 0 | DISCHARGE
Start: 2025-01-01

## 2025-01-01 RX ORDER — DEXMEDETOMIDINE HYDROCHLORIDE IN SODIUM CHLORIDE 4 UG/ML
0.1 INJECTION INTRAVENOUS
Qty: 400 | Refills: 0 | Status: DISCONTINUED | OUTPATIENT
Start: 2025-01-01 | End: 2025-01-01

## 2025-01-01 RX ORDER — NOREPINEPHRINE BITARTRATE 8 MG
0.7 SOLUTION INTRAVENOUS
Qty: 16 | Refills: 0 | Status: DISCONTINUED | OUTPATIENT
Start: 2025-01-01 | End: 2025-01-01

## 2025-01-01 RX ORDER — OMEPRAZOLE 20 MG/1
1 CAPSULE, DELAYED RELEASE ORAL
Refills: 0 | DISCHARGE

## 2025-01-01 RX ORDER — NOREPINEPHRINE BITARTRATE 8 MG
0.4 SOLUTION INTRAVENOUS
Qty: 16 | Refills: 0 | Status: DISCONTINUED | OUTPATIENT
Start: 2025-01-01 | End: 2025-01-01

## 2025-01-01 RX ORDER — HEPARIN SODIUM 1000 [USP'U]/ML
800 INJECTION INTRAVENOUS; SUBCUTANEOUS
Qty: 25000 | Refills: 0 | Status: DISCONTINUED | OUTPATIENT
Start: 2025-01-01 | End: 2025-01-01

## 2025-01-01 RX ORDER — CALCIUM GLUCONATE 20 MG/ML
2 INJECTION, SOLUTION INTRAVENOUS ONCE
Refills: 0 | Status: DISCONTINUED | OUTPATIENT
Start: 2025-01-01 | End: 2025-01-01

## 2025-01-01 RX ORDER — HEPARIN SODIUM 1000 [USP'U]/ML
3500 INJECTION INTRAVENOUS; SUBCUTANEOUS EVERY 6 HOURS
Refills: 0 | Status: DISCONTINUED | OUTPATIENT
Start: 2025-01-01 | End: 2025-01-01

## 2025-01-01 RX ORDER — SODIUM SULFATE, POTASSIUM SULFATE, MAGNESIUM SULFATE 1.6; 3.13; 17.5 G/177ML; G/177ML; G/177ML
0 SOLUTION ORAL
Qty: 0 | Refills: 0 | DISCHARGE
Start: 2025-01-01

## 2025-01-01 RX ORDER — DICLOFENAC SODIUM 10 MG/G
0 GEL TOPICAL
Qty: 0 | Refills: 0 | DISCHARGE
Start: 2025-01-01

## 2025-01-01 RX ORDER — HEPARIN SODIUM 1000 [USP'U]/ML
5000 INJECTION INTRAVENOUS; SUBCUTANEOUS EVERY 12 HOURS
Refills: 0 | Status: DISCONTINUED | OUTPATIENT
Start: 2025-01-01 | End: 2025-01-01

## 2025-01-01 RX ORDER — LOSARTAN POTASSIUM 100 MG/1
100 TABLET, FILM COATED ORAL DAILY
Refills: 0 | Status: DISCONTINUED | OUTPATIENT
Start: 2025-01-01 | End: 2025-01-01

## 2025-01-01 RX ADMIN — Medication 25 GRAM(S): at 06:29

## 2025-01-01 RX ADMIN — NOREPINEPHRINE BITARTRATE 7.86 MICROGRAM(S)/KG/MIN: 8 SOLUTION at 20:25

## 2025-01-01 RX ADMIN — Medication 5 UNIT(S): at 16:04

## 2025-01-01 RX ADMIN — Medication 25 GRAM(S): at 02:17

## 2025-01-01 RX ADMIN — DEXMEDETOMIDINE HYDROCHLORIDE IN SODIUM CHLORIDE 1.14 MICROGRAM(S)/KG/HR: 4 INJECTION INTRAVENOUS at 01:28

## 2025-01-01 RX ADMIN — NOREPINEPHRINE BITARTRATE 29.8 MICROGRAM(S)/KG/MIN: 8 SOLUTION at 23:13

## 2025-01-01 RX ADMIN — MUPIROCIN CALCIUM 1 APPLICATION(S): 20 CREAM TOPICAL at 05:29

## 2025-01-01 RX ADMIN — Medication 50 MILLILITER(S): at 16:04

## 2025-01-01 RX ADMIN — Medication 50 MILLIGRAM(S): at 00:21

## 2025-01-01 RX ADMIN — DEXMEDETOMIDINE HYDROCHLORIDE IN SODIUM CHLORIDE 1.14 MICROGRAM(S)/KG/HR: 4 INJECTION INTRAVENOUS at 22:06

## 2025-01-01 RX ADMIN — Medication 50 MILLIEQUIVALENT(S): at 11:37

## 2025-01-01 RX ADMIN — Medication 50 MILLIGRAM(S): at 14:22

## 2025-01-01 RX ADMIN — NOREPINEPHRINE BITARTRATE 29.8 MICROGRAM(S)/KG/MIN: 8 SOLUTION at 07:57

## 2025-01-01 RX ADMIN — SODIUM CHLORIDE 1000 MILLILITER(S): 9 INJECTION, SOLUTION INTRAVENOUS at 16:14

## 2025-01-01 RX ADMIN — Medication 1 MG/HR: at 18:53

## 2025-01-01 RX ADMIN — Medication 50 MILLIGRAM(S): at 11:43

## 2025-01-01 RX ADMIN — Medication 150 MEQ/KG/HR: at 12:48

## 2025-01-01 RX ADMIN — HEPARIN SODIUM 5000 UNIT(S): 1000 INJECTION INTRAVENOUS; SUBCUTANEOUS at 23:17

## 2025-01-01 RX ADMIN — Medication 0.5 MILLIGRAM(S): at 21:00

## 2025-01-01 RX ADMIN — HEPARIN SODIUM 5000 UNIT(S): 1000 INJECTION INTRAVENOUS; SUBCUTANEOUS at 17:50

## 2025-01-01 RX ADMIN — Medication 50 MILLIGRAM(S): at 17:47

## 2025-01-01 RX ADMIN — Medication 0.5 MILLIGRAM(S): at 18:53

## 2025-01-01 RX ADMIN — HEPARIN SODIUM 5000 UNIT(S): 1000 INJECTION INTRAVENOUS; SUBCUTANEOUS at 06:21

## 2025-01-01 RX ADMIN — Medication 1 APPLICATION(S): at 05:35

## 2025-01-01 RX ADMIN — DEXMEDETOMIDINE HYDROCHLORIDE IN SODIUM CHLORIDE 1.14 MICROGRAM(S)/KG/HR: 4 INJECTION INTRAVENOUS at 12:48

## 2025-01-01 RX ADMIN — BUMETANIDE 2 MILLIGRAM(S): 1 TABLET ORAL at 09:30

## 2025-01-01 RX ADMIN — Medication 25 GRAM(S): at 14:42

## 2025-01-01 RX ADMIN — Medication 50 MILLILITER(S): at 01:15

## 2025-01-01 RX ADMIN — VASOPRESSIN 6 UNIT(S)/MIN: 20 INJECTION INTRAVENOUS at 11:37

## 2025-01-01 RX ADMIN — SODIUM CHLORIDE 500 MILLILITER(S): 9 INJECTION, SOLUTION INTRAVENOUS at 12:15

## 2025-01-01 RX ADMIN — FUROSEMIDE 40 MILLIGRAM(S): 10 INJECTION INTRAMUSCULAR; INTRAVENOUS at 10:27

## 2025-01-01 RX ADMIN — SODIUM CHLORIDE 1000 MILLILITER(S): 9 INJECTION, SOLUTION INTRAVENOUS at 17:00

## 2025-01-01 RX ADMIN — Medication 25 GRAM(S): at 22:01

## 2025-01-01 RX ADMIN — Medication 50 MILLIGRAM(S): at 02:17

## 2025-01-01 RX ADMIN — NOREPINEPHRINE BITARTRATE 6.2 MICROGRAM(S)/KG/MIN: 8 SOLUTION at 02:13

## 2025-01-01 RX ADMIN — Medication 1 APPLICATION(S): at 06:32

## 2025-01-01 RX ADMIN — VASOPRESSIN 6 UNIT(S)/MIN: 20 INJECTION INTRAVENOUS at 07:58

## 2025-01-01 RX ADMIN — Medication 250 MILLIGRAM(S): at 13:28

## 2025-01-01 RX ADMIN — Medication 0.5 MILLIGRAM(S): at 20:31

## 2025-01-01 RX ADMIN — SODIUM CHLORIDE 2000 MILLILITER(S): 9 INJECTION, SOLUTION INTRAVENOUS at 12:30

## 2025-01-01 RX ADMIN — FUROSEMIDE 20 MILLIGRAM(S): 10 INJECTION INTRAMUSCULAR; INTRAVENOUS at 22:01

## 2025-01-01 RX ADMIN — Medication 50 MILLIGRAM(S): at 06:29

## 2025-01-01 RX ADMIN — Medication 50 MILLIGRAM(S): at 14:43

## 2025-01-01 RX ADMIN — NOREPINEPHRINE BITARTRATE 29.8 MICROGRAM(S)/KG/MIN: 8 SOLUTION at 20:10

## 2025-01-01 RX ADMIN — Medication 40 MILLIGRAM(S): at 12:06

## 2025-01-01 RX ADMIN — Medication 25 GRAM(S): at 05:27

## 2025-01-01 RX ADMIN — Medication 5 MILLIGRAM(S): at 22:40

## 2025-01-01 RX ADMIN — Medication 50 MILLIGRAM(S): at 05:27

## 2025-01-01 RX ADMIN — Medication 50 MILLILITER(S): at 01:17

## 2025-01-01 RX ADMIN — NITROGLYCERIN 2 INCH(S): 20 OINTMENT TOPICAL at 22:40

## 2025-01-01 RX ADMIN — VASOPRESSIN 6 UNIT(S)/MIN: 20 INJECTION INTRAVENOUS at 00:21

## 2025-01-01 RX ADMIN — NOREPINEPHRINE BITARTRATE 6.2 MICROGRAM(S)/KG/MIN: 8 SOLUTION at 07:50

## 2025-01-01 RX ADMIN — Medication 260 MILLIGRAM(S): at 08:43

## 2025-01-01 RX ADMIN — BUMETANIDE 2 MILLIGRAM(S): 1 TABLET ORAL at 04:27

## 2025-01-01 RX ADMIN — CALCIUM GLUCONATE 200 GRAM(S): 20 INJECTION, SOLUTION INTRAVENOUS at 16:04

## 2025-01-01 RX ADMIN — Medication 150 MEQ/KG/HR: at 07:58

## 2025-01-01 RX ADMIN — Medication 50 MILLIGRAM(S): at 22:31

## 2025-01-01 RX ADMIN — Medication 1 APPLICATION(S): at 23:16

## 2025-01-01 RX ADMIN — NOREPINEPHRINE BITARTRATE 4.26 MICROGRAM(S)/KG/MIN: 8 SOLUTION at 17:48

## 2025-01-01 RX ADMIN — Medication 25 GRAM(S): at 13:11

## 2025-01-01 RX ADMIN — Medication 25 GRAM(S): at 14:21

## 2025-01-01 RX ADMIN — Medication 0.5 MILLIGRAM(S): at 21:26

## 2025-01-01 RX ADMIN — Medication 40 MILLIGRAM(S): at 11:38

## 2025-01-01 RX ADMIN — ALBUMIN (HUMAN) 125 MILLILITER(S): 12.5 INJECTION, SOLUTION INTRAVENOUS at 02:13

## 2025-01-01 RX ADMIN — FUROSEMIDE 40 MILLIGRAM(S): 10 INJECTION INTRAMUSCULAR; INTRAVENOUS at 17:50

## 2025-01-01 RX ADMIN — SODIUM CHLORIDE 1000 MILLILITER(S): 9 INJECTION, SOLUTION INTRAVENOUS at 21:21

## 2025-01-01 RX ADMIN — LOSARTAN POTASSIUM 100 MILLIGRAM(S): 100 TABLET, FILM COATED ORAL at 08:58

## 2025-01-01 RX ADMIN — VASOPRESSIN 6 UNIT(S)/MIN: 20 INJECTION INTRAVENOUS at 23:14

## 2025-01-01 RX ADMIN — HEPARIN SODIUM 5000 UNIT(S): 1000 INJECTION INTRAVENOUS; SUBCUTANEOUS at 17:30

## 2025-01-01 RX ADMIN — HEPARIN SODIUM 5000 UNIT(S): 1000 INJECTION INTRAVENOUS; SUBCUTANEOUS at 05:29

## 2025-01-01 RX ADMIN — BUMETANIDE 10 MG/HR: 1 TABLET ORAL at 10:10

## 2025-01-01 RX ADMIN — SODIUM CHLORIDE 75 MILLILITER(S): 9 INJECTION, SOLUTION INTRAVENOUS at 23:31

## 2025-01-01 RX ADMIN — DEXMEDETOMIDINE HYDROCHLORIDE IN SODIUM CHLORIDE 1.14 MICROGRAM(S)/KG/HR: 4 INJECTION INTRAVENOUS at 20:10

## 2025-01-01 RX ADMIN — SODIUM CHLORIDE 2000 MILLILITER(S): 9 INJECTION, SOLUTION INTRAVENOUS at 14:30

## 2025-01-01 RX ADMIN — MUPIROCIN CALCIUM 1 APPLICATION(S): 20 CREAM TOPICAL at 17:50

## 2025-01-01 RX ADMIN — SODIUM CHLORIDE 500 MILLILITER(S): 9 INJECTION, SOLUTION INTRAVENOUS at 10:42

## 2025-01-01 RX ADMIN — VASOPRESSIN 6 UNIT(S)/MIN: 20 INJECTION INTRAVENOUS at 20:10

## 2025-01-01 RX ADMIN — ARGATROBAN 0.79 MICROGRAM(S)/KG/MIN: 100 INJECTION, SOLUTION INTRAVENOUS at 12:20

## 2025-01-01 RX ADMIN — HEPARIN SODIUM 5000 UNIT(S): 1000 INJECTION INTRAVENOUS; SUBCUTANEOUS at 06:29

## 2025-01-01 RX ADMIN — Medication 150 MEQ/KG/HR: at 20:11

## 2025-01-01 RX ADMIN — Medication 650 MILLIGRAM(S): at 09:13

## 2025-01-01 RX ADMIN — CEFEPIME 100 MILLIGRAM(S): 2 INJECTION, POWDER, FOR SOLUTION INTRAVENOUS at 12:30

## 2025-01-01 RX ADMIN — Medication 5 UNIT(S): at 01:18

## 2025-01-01 RX ADMIN — Medication 25 GRAM(S): at 22:31

## 2025-01-01 RX ADMIN — MUPIROCIN CALCIUM 1 APPLICATION(S): 20 CREAM TOPICAL at 06:30

## 2025-01-01 RX ADMIN — SODIUM CHLORIDE 500 MILLILITER(S): 9 INJECTION, SOLUTION INTRAVENOUS at 11:12

## 2025-01-01 RX ADMIN — Medication 1 APPLICATION(S): at 06:20

## 2025-01-01 RX ADMIN — CEFEPIME 1000 MILLIGRAM(S): 2 INJECTION, POWDER, FOR SOLUTION INTRAVENOUS at 13:15

## 2025-03-15 RX ORDER — HYDROCHLOROTHIAZIDE 12.5 MG/1
12.5 CAPSULE ORAL DAILY
Qty: 90 | Refills: 1 | Status: ACTIVE | COMMUNITY
Start: 2025-03-14 | End: 1900-01-01

## 2025-03-15 RX ORDER — TELMISARTAN 80 MG/1
80 TABLET ORAL
Qty: 90 | Refills: 1 | Status: ACTIVE | COMMUNITY
Start: 2025-03-14 | End: 1900-01-01

## 2025-03-17 ENCOUNTER — APPOINTMENT (OUTPATIENT)
Dept: ORTHOPEDIC SURGERY | Facility: CLINIC | Age: 75
End: 2025-03-17
Payer: COMMERCIAL

## 2025-03-17 ENCOUNTER — TRANSCRIPTION ENCOUNTER (OUTPATIENT)
Age: 75
End: 2025-03-17

## 2025-03-17 ENCOUNTER — NON-APPOINTMENT (OUTPATIENT)
Age: 75
End: 2025-03-17

## 2025-03-17 VITALS — HEIGHT: 66 IN | WEIGHT: 112 LBS | BODY MASS INDEX: 18 KG/M2

## 2025-03-17 DIAGNOSIS — G89.29 PAIN IN RIGHT KNEE: ICD-10-CM

## 2025-03-17 DIAGNOSIS — M25.562 PAIN IN RIGHT KNEE: ICD-10-CM

## 2025-03-17 DIAGNOSIS — M25.561 PAIN IN RIGHT KNEE: ICD-10-CM

## 2025-03-17 DIAGNOSIS — M79.672 PAIN IN LEFT FOOT: ICD-10-CM

## 2025-03-17 PROCEDURE — 73630 X-RAY EXAM OF FOOT: CPT | Mod: LT

## 2025-03-17 PROCEDURE — 99213 OFFICE O/P EST LOW 20 MIN: CPT

## 2025-03-17 PROCEDURE — 73564 X-RAY EXAM KNEE 4 OR MORE: CPT | Mod: 50

## 2025-03-18 ENCOUNTER — OUTPATIENT (OUTPATIENT)
Dept: OUTPATIENT SERVICES | Facility: HOSPITAL | Age: 75
LOS: 1 days | End: 2025-03-18
Payer: COMMERCIAL

## 2025-03-18 ENCOUNTER — APPOINTMENT (OUTPATIENT)
Dept: MRI IMAGING | Facility: CLINIC | Age: 75
End: 2025-03-18
Payer: COMMERCIAL

## 2025-03-18 DIAGNOSIS — S99.922A UNSPECIFIED INJURY OF LEFT FOOT, INITIAL ENCOUNTER: ICD-10-CM

## 2025-03-18 PROCEDURE — 73718 MRI LOWER EXTREMITY W/O DYE: CPT

## 2025-03-18 PROCEDURE — 73718 MRI LOWER EXTREMITY W/O DYE: CPT | Mod: 26,LT

## 2025-03-21 ENCOUNTER — NON-APPOINTMENT (OUTPATIENT)
Age: 75
End: 2025-03-21

## 2025-03-23 ENCOUNTER — NON-APPOINTMENT (OUTPATIENT)
Age: 75
End: 2025-03-23

## 2025-03-26 ENCOUNTER — APPOINTMENT (OUTPATIENT)
Dept: ORTHOPEDIC SURGERY | Facility: CLINIC | Age: 75
End: 2025-03-26
Payer: COMMERCIAL

## 2025-03-26 VITALS
WEIGHT: 112 LBS | SYSTOLIC BLOOD PRESSURE: 149 MMHG | DIASTOLIC BLOOD PRESSURE: 86 MMHG | BODY MASS INDEX: 18 KG/M2 | HEIGHT: 66 IN | HEART RATE: 82 BPM

## 2025-03-26 DIAGNOSIS — T14.8XXA OTHER INJURY OF UNSPECIFIED BODY REGION, INITIAL ENCOUNTER: ICD-10-CM

## 2025-03-26 DIAGNOSIS — S92.022D DISPLACED FRACTURE OF ANTERIOR PROCESS OF LEFT CALCANEUS, SUBSEQUENT ENCOUNTER FOR FRACTURE WITH ROUTINE HEALING: ICD-10-CM

## 2025-03-26 DIAGNOSIS — S96.912D STRAIN OF UNSPECIFIED MUSCLE AND TENDON AT ANKLE AND FOOT LEVEL, LEFT FOOT, SUBSEQUENT ENCOUNTER: ICD-10-CM

## 2025-03-26 PROCEDURE — 73600 X-RAY EXAM OF ANKLE: CPT | Mod: LT

## 2025-03-26 PROCEDURE — 99204 OFFICE O/P NEW MOD 45 MIN: CPT

## 2025-03-26 PROCEDURE — 73630 X-RAY EXAM OF FOOT: CPT | Mod: LT

## 2025-03-29 PROBLEM — S96.912D STRAIN OF FOOT, LEFT, SUBSEQUENT ENCOUNTER: Status: ACTIVE | Noted: 2025-03-29

## 2025-03-29 PROBLEM — T14.8XXA AVULSION FRACTURE: Status: ACTIVE | Noted: 2025-03-29

## 2025-03-29 PROBLEM — S92.022D CLOSED DISPLACED FRACTURE OF ANTERIOR PROCESS OF LEFT CALCANEUS WITH ROUTINE HEALING, SUBSEQUENT ENCOUNTER: Status: ACTIVE | Noted: 2025-03-29

## 2025-04-07 ENCOUNTER — APPOINTMENT (OUTPATIENT)
Dept: INTERNAL MEDICINE | Facility: CLINIC | Age: 75
End: 2025-04-07

## 2025-04-29 ENCOUNTER — APPOINTMENT (OUTPATIENT)
Dept: GASTROENTEROLOGY | Facility: CLINIC | Age: 75
End: 2025-04-29
Payer: COMMERCIAL

## 2025-04-29 VITALS
BODY MASS INDEX: 25.71 KG/M2 | HEIGHT: 66 IN | DIASTOLIC BLOOD PRESSURE: 69 MMHG | HEART RATE: 91 BPM | OXYGEN SATURATION: 100 % | SYSTOLIC BLOOD PRESSURE: 109 MMHG | WEIGHT: 160 LBS

## 2025-04-29 DIAGNOSIS — R19.7 DIARRHEA, UNSPECIFIED: ICD-10-CM

## 2025-04-29 DIAGNOSIS — K21.9 GASTRO-ESOPHAGEAL REFLUX DISEASE W/OUT ESOPHAGITIS: ICD-10-CM

## 2025-04-29 DIAGNOSIS — Z86.79 PERSONAL HISTORY OF OTHER DISEASES OF THE CIRCULATORY SYSTEM: ICD-10-CM

## 2025-04-29 PROCEDURE — 99204 OFFICE O/P NEW MOD 45 MIN: CPT

## 2025-04-29 RX ORDER — SODIUM SULFATE, POTASSIUM SULFATE AND MAGNESIUM SULFATE 1.6; 3.13; 17.5 G/177ML; G/177ML; G/177ML
17.5-3.13-1.6 SOLUTION ORAL
Qty: 1 | Refills: 0 | Status: ACTIVE | COMMUNITY
Start: 2025-04-29 | End: 1900-01-01

## 2025-04-29 RX ORDER — OMEPRAZOLE 40 MG/1
40 CAPSULE, DELAYED RELEASE ORAL
Qty: 30 | Refills: 3 | Status: ACTIVE | COMMUNITY
Start: 2025-04-29 | End: 1900-01-01

## 2025-04-30 DIAGNOSIS — R10.84 GENERALIZED ABDOMINAL PAIN: ICD-10-CM

## 2025-04-30 LAB
ALBUMIN SERPL ELPH-MCNC: 3.7 G/DL
ALP BLD-CCNC: 84 U/L
ALT SERPL-CCNC: 6 U/L
ANION GAP SERPL CALC-SCNC: 13 MMOL/L
AST SERPL-CCNC: 20 U/L
BILIRUB SERPL-MCNC: 0.4 MG/DL
BUN SERPL-MCNC: 18 MG/DL
CALCIUM SERPL-MCNC: 9.4 MG/DL
CHLORIDE SERPL-SCNC: 100 MMOL/L
CHOLEST SERPL-MCNC: 167 MG/DL
CO2 SERPL-SCNC: 23 MMOL/L
CREAT SERPL-MCNC: 0.69 MG/DL
EGFRCR SERPLBLD CKD-EPI 2021: 91 ML/MIN/1.73M2
ESTIMATED AVERAGE GLUCOSE: 108 MG/DL
GLUCOSE SERPL-MCNC: 110 MG/DL
HBA1C MFR BLD HPLC: 5.4 %
HCT VFR BLD CALC: 39.3 %
HDLC SERPL-MCNC: 50 MG/DL
HGB BLD-MCNC: 12.6 G/DL
LDLC SERPL-MCNC: 102 MG/DL
MCHC RBC-ENTMCNC: 27.1 PG
MCHC RBC-ENTMCNC: 32.1 G/DL
MCV RBC AUTO: 84.5 FL
NONHDLC SERPL-MCNC: 117 MG/DL
PLATELET # BLD AUTO: 570 K/UL
POTASSIUM SERPL-SCNC: 4.9 MMOL/L
PROT SERPL-MCNC: 6.4 G/DL
RBC # BLD: 4.65 M/UL
RBC # FLD: 15.1 %
SODIUM SERPL-SCNC: 136 MMOL/L
TRIGL SERPL-MCNC: 82 MG/DL
TSH SERPL-ACNC: 1.79 UIU/ML
WBC # FLD AUTO: 13.69 K/UL

## 2025-05-03 ENCOUNTER — APPOINTMENT (OUTPATIENT)
Dept: CT IMAGING | Facility: CLINIC | Age: 75
End: 2025-05-03

## 2025-05-06 ENCOUNTER — APPOINTMENT (OUTPATIENT)
Dept: CT IMAGING | Facility: CLINIC | Age: 75
End: 2025-05-06

## 2025-05-06 ENCOUNTER — OUTPATIENT (OUTPATIENT)
Dept: OUTPATIENT SERVICES | Facility: HOSPITAL | Age: 75
LOS: 1 days | End: 2025-05-06
Payer: COMMERCIAL

## 2025-05-06 DIAGNOSIS — R10.84 GENERALIZED ABDOMINAL PAIN: ICD-10-CM

## 2025-05-06 PROCEDURE — 74176 CT ABD & PELVIS W/O CONTRAST: CPT

## 2025-05-06 PROCEDURE — 74176 CT ABD & PELVIS W/O CONTRAST: CPT | Mod: 26

## 2025-05-07 ENCOUNTER — NON-APPOINTMENT (OUTPATIENT)
Age: 75
End: 2025-05-07

## 2025-05-08 ENCOUNTER — APPOINTMENT (OUTPATIENT)
Dept: GASTROENTEROLOGY | Facility: CLINIC | Age: 75
End: 2025-05-08

## 2025-05-10 ENCOUNTER — TRANSCRIPTION ENCOUNTER (OUTPATIENT)
Age: 75
End: 2025-05-10

## 2025-05-12 ENCOUNTER — TRANSCRIPTION ENCOUNTER (OUTPATIENT)
Age: 75
End: 2025-05-12

## 2025-05-13 ENCOUNTER — NON-APPOINTMENT (OUTPATIENT)
Age: 75
End: 2025-05-13

## 2025-05-16 ENCOUNTER — RESULT REVIEW (OUTPATIENT)
Age: 75
End: 2025-05-16

## 2025-05-16 ENCOUNTER — OUTPATIENT (OUTPATIENT)
Dept: OUTPATIENT SERVICES | Facility: HOSPITAL | Age: 75
LOS: 1 days | End: 2025-05-16
Payer: COMMERCIAL

## 2025-05-16 ENCOUNTER — APPOINTMENT (OUTPATIENT)
Dept: ULTRASOUND IMAGING | Facility: HOSPITAL | Age: 75
End: 2025-05-16

## 2025-05-16 VITALS
WEIGHT: 100.09 LBS | TEMPERATURE: 98 F | DIASTOLIC BLOOD PRESSURE: 82 MMHG | SYSTOLIC BLOOD PRESSURE: 117 MMHG | RESPIRATION RATE: 18 BRPM | HEIGHT: 66 IN | OXYGEN SATURATION: 94 % | HEART RATE: 78 BPM

## 2025-05-16 DIAGNOSIS — Z00.00 ENCOUNTER FOR GENERAL ADULT MEDICAL EXAMINATION WITHOUT ABNORMAL FINDINGS: ICD-10-CM

## 2025-05-16 DIAGNOSIS — K66.8 OTHER SPECIFIED DISORDERS OF PERITONEUM: ICD-10-CM

## 2025-05-16 LAB
APTT BLD: 28.7 SEC — SIGNIFICANT CHANGE UP (ref 26.1–36.8)
INR BLD: 0.96 RATIO — SIGNIFICANT CHANGE UP (ref 0.85–1.16)
PROTHROM AB SERPL-ACNC: 10.9 SEC — SIGNIFICANT CHANGE UP (ref 9.9–13.4)

## 2025-05-16 PROCEDURE — 20206 BIOPSY MUSCLE PERQ NEEDLE: CPT

## 2025-05-16 PROCEDURE — 88341 IMHCHEM/IMCYTCHM EA ADD ANTB: CPT | Mod: 26,59

## 2025-05-16 PROCEDURE — 88341 IMHCHEM/IMCYTCHM EA ADD ANTB: CPT

## 2025-05-16 PROCEDURE — 88360 TUMOR IMMUNOHISTOCHEM/MANUAL: CPT

## 2025-05-16 PROCEDURE — 88342 IMHCHEM/IMCYTCHM 1ST ANTB: CPT

## 2025-05-16 PROCEDURE — 85730 THROMBOPLASTIN TIME PARTIAL: CPT

## 2025-05-16 PROCEDURE — 76942 ECHO GUIDE FOR BIOPSY: CPT

## 2025-05-16 PROCEDURE — 88307 TISSUE EXAM BY PATHOLOGIST: CPT | Mod: 26

## 2025-05-16 PROCEDURE — 85610 PROTHROMBIN TIME: CPT

## 2025-05-16 PROCEDURE — 76942 ECHO GUIDE FOR BIOPSY: CPT | Mod: 26

## 2025-05-16 PROCEDURE — 88342 IMHCHEM/IMCYTCHM 1ST ANTB: CPT | Mod: 26,59

## 2025-05-16 PROCEDURE — 88173 CYTOPATH EVAL FNA REPORT: CPT | Mod: 26

## 2025-05-16 PROCEDURE — 88360 TUMOR IMMUNOHISTOCHEM/MANUAL: CPT | Mod: 26

## 2025-05-16 PROCEDURE — 88173 CYTOPATH EVAL FNA REPORT: CPT

## 2025-05-16 PROCEDURE — 88305 TISSUE EXAM BY PATHOLOGIST: CPT | Mod: 26

## 2025-05-16 PROCEDURE — 36415 COLL VENOUS BLD VENIPUNCTURE: CPT

## 2025-05-16 PROCEDURE — 88172 CYTP DX EVAL FNA 1ST EA SITE: CPT

## 2025-05-16 PROCEDURE — 88305 TISSUE EXAM BY PATHOLOGIST: CPT

## 2025-05-16 PROCEDURE — 88172 CYTP DX EVAL FNA 1ST EA SITE: CPT | Mod: 26

## 2025-05-16 PROCEDURE — 88307 TISSUE EXAM BY PATHOLOGIST: CPT

## 2025-05-16 RX ORDER — ACETAMINOPHEN 500 MG/5ML
650 LIQUID (ML) ORAL EVERY 6 HOURS
Refills: 0 | Status: ACTIVE | OUTPATIENT
Start: 2025-05-16 | End: 2026-04-14

## 2025-05-16 NOTE — ASU PREOP CHECKLIST - NEEDLE GAUGE
HISTORY OF PRESENT ILLNESS  Jossue Steel is a 29 y.o. female. HPI: Patient complaining of right shoulder pain radiating to right scapula x 4 days. She describes pain as dull, worse with lying on right side, better with sititng, rate pain 5/10. Took tylenol without help  She is taking Ambien 10 mg to insomnia, will decreased the dose to 5 mg    Past Medical History:   Diagnosis Date    Asthma     Depression     anxiety in the past    Endometriosis 2002    GERD (gastroesophageal reflux disease)      Past Surgical History:   Procedure Laterality Date    HC PROBE DOPPLER LAPROSCOPIC DISP  2008    HX GYN  04/17/2018    Myomectomy    HX ORTHOPAEDIC  2002    left shoulder     Allergies   Allergen Reactions    Mold Hives    Penicillins Hives     Current Outpatient Medications:     meloxicam (MOBIC) 15 mg tablet, Take 1 Tab by mouth daily. , Disp: 30 Tab, Rfl: 0    zolpidem (AMBIEN) 5 mg tablet, Take 1 Tab by mouth nightly as needed for Sleep. Max Daily Amount: 5 mg., Disp: 30 Tab, Rfl: 0    omeprazole (PRILOSEC) 40 mg capsule, TAKE 1 CAPSULE BY MOUTH EVERY DAY, Disp: 90 Cap, Rfl: 2    ferrous sulfate (SLOW FE) 142 mg (45 mg iron) ER tablet, Please give whatever slow iron you have, Disp: 30 Tab, Rfl: 3    ergocalciferol (ERGOCALCIFEROL) 50,000 unit capsule, Take 1 Cap by mouth every seven (7) days. , Disp: 12 Cap, Rfl: 1  Review of Systems   Constitutional: Negative. Respiratory: Negative. Cardiovascular: Negative. Gastrointestinal: Negative. Musculoskeletal: Positive for joint pain. Blood pressure 130/86, pulse 70, temperature 98.6 °F (37 °C), temperature source Oral, resp. rate 16, height 5' 2\" (1.575 m), weight 183 lb (83 kg), SpO2 100 %. Physical Exam   Constitutional: No distress. HENT:   Mouth/Throat: Oropharynx is clear and moist.   Neck: Normal range of motion. Neck supple. Cardiovascular: Normal rate and regular rhythm. No murmur heard.   Pulmonary/Chest: Effort normal and breath sounds normal.   Abdominal: Soft. Bowel sounds are normal.   Musculoskeletal: Normal range of motion. She exhibits tenderness. She exhibits no edema or deformity. Right shoulder pain with ROM, no swelling no deformity   Nursing note and vitals reviewed. ASSESSMENT and PLAN  Diagnoses and all orders for this visit:    1. Acute pain of right shoulder  -     meloxicam (MOBIC) 15 mg tablet; Take 1 Tab by mouth daily. 2. Other insomnia  -     zolpidem (AMBIEN) 5 mg tablet; Take 1 Tab by mouth nightly as needed for Sleep. Max Daily Amount: 5 mg.   Pt was given an after visit summary which includes diagnosis, current medicines and vital and voiced understanding of treatment plan 22

## 2025-05-23 ENCOUNTER — NON-APPOINTMENT (OUTPATIENT)
Age: 75
End: 2025-05-23

## 2025-05-23 LAB — NON-GYNECOLOGICAL CYTOLOGY STUDY: SIGNIFICANT CHANGE UP

## 2025-05-27 ENCOUNTER — NON-APPOINTMENT (OUTPATIENT)
Age: 75
End: 2025-05-27

## 2025-06-04 ENCOUNTER — RESULT REVIEW (OUTPATIENT)
Age: 75
End: 2025-06-04

## 2025-06-04 ENCOUNTER — NON-APPOINTMENT (OUTPATIENT)
Age: 75
End: 2025-06-04

## 2025-06-04 ENCOUNTER — APPOINTMENT (OUTPATIENT)
Dept: HEMATOLOGY ONCOLOGY | Facility: CLINIC | Age: 75
End: 2025-06-04
Payer: COMMERCIAL

## 2025-06-04 ENCOUNTER — EMERGENCY (EMERGENCY)
Facility: HOSPITAL | Age: 75
LOS: 1 days | End: 2025-06-04
Attending: STUDENT IN AN ORGANIZED HEALTH CARE EDUCATION/TRAINING PROGRAM | Admitting: STUDENT IN AN ORGANIZED HEALTH CARE EDUCATION/TRAINING PROGRAM
Payer: COMMERCIAL

## 2025-06-04 VITALS
OXYGEN SATURATION: 90 % | HEART RATE: 103 BPM | WEIGHT: 107.36 LBS | DIASTOLIC BLOOD PRESSURE: 91 MMHG | TEMPERATURE: 97.5 F | RESPIRATION RATE: 17 BRPM | HEIGHT: 66 IN | SYSTOLIC BLOOD PRESSURE: 126 MMHG | BODY MASS INDEX: 17.25 KG/M2

## 2025-06-04 VITALS
HEART RATE: 101 BPM | OXYGEN SATURATION: 95 % | RESPIRATION RATE: 18 BRPM | DIASTOLIC BLOOD PRESSURE: 106 MMHG | SYSTOLIC BLOOD PRESSURE: 140 MMHG | TEMPERATURE: 97 F

## 2025-06-04 VITALS — HEIGHT: 66 IN | WEIGHT: 106.92 LBS

## 2025-06-04 DIAGNOSIS — C18.9 MALIGNANT NEOPLASM OF COLON, UNSPECIFIED: ICD-10-CM

## 2025-06-04 LAB
ALBUMIN SERPL ELPH-MCNC: 3.1 G/DL — LOW (ref 3.3–5)
ALP SERPL-CCNC: 80 U/L — SIGNIFICANT CHANGE UP (ref 40–120)
ALT FLD-CCNC: <5 U/L — SIGNIFICANT CHANGE UP (ref 4–33)
ANION GAP SERPL CALC-SCNC: 23 MMOL/L — HIGH (ref 7–14)
APTT BLD: 25.9 SEC — LOW (ref 26.1–36.8)
AST SERPL-CCNC: 18 U/L — SIGNIFICANT CHANGE UP (ref 4–32)
BASOPHILS # BLD AUTO: 0.04 K/UL — SIGNIFICANT CHANGE UP (ref 0–0.2)
BASOPHILS NFR BLD AUTO: 0.2 % — SIGNIFICANT CHANGE UP (ref 0–2)
BILIRUB SERPL-MCNC: 0.3 MG/DL — SIGNIFICANT CHANGE UP (ref 0.2–1.2)
BUN SERPL-MCNC: 21 MG/DL — SIGNIFICANT CHANGE UP (ref 7–23)
CALCIUM SERPL-MCNC: 9.1 MG/DL — SIGNIFICANT CHANGE UP (ref 8.4–10.5)
CHLORIDE SERPL-SCNC: 92 MMOL/L — LOW (ref 98–107)
CO2 SERPL-SCNC: 17 MMOL/L — LOW (ref 22–31)
CREAT SERPL-MCNC: 0.68 MG/DL — SIGNIFICANT CHANGE UP (ref 0.5–1.3)
EGFR: 91 ML/MIN/1.73M2 — SIGNIFICANT CHANGE UP
EGFR: 91 ML/MIN/1.73M2 — SIGNIFICANT CHANGE UP
EOSINOPHIL # BLD AUTO: 0.06 K/UL — SIGNIFICANT CHANGE UP (ref 0–0.5)
EOSINOPHIL NFR BLD AUTO: 0.3 % — SIGNIFICANT CHANGE UP (ref 0–6)
GAS PNL BLDV: SIGNIFICANT CHANGE UP
GLUCOSE SERPL-MCNC: 104 MG/DL — HIGH (ref 70–99)
HCT VFR BLD CALC: 40.1 % — SIGNIFICANT CHANGE UP (ref 34.5–45)
HGB BLD-MCNC: 12.5 G/DL — SIGNIFICANT CHANGE UP (ref 11.5–15.5)
IANC: 16.8 K/UL — HIGH (ref 1.8–7.4)
IMM GRANULOCYTES NFR BLD AUTO: 0.8 % — SIGNIFICANT CHANGE UP (ref 0–0.9)
INR BLD: 0.89 RATIO — SIGNIFICANT CHANGE UP (ref 0.85–1.16)
LYMPHOCYTES # BLD AUTO: 0.98 K/UL — LOW (ref 1–3.3)
LYMPHOCYTES # BLD AUTO: 5.3 % — LOW (ref 13–44)
MAGNESIUM SERPL-MCNC: 2.1 MG/DL — SIGNIFICANT CHANGE UP (ref 1.6–2.6)
MCHC RBC-ENTMCNC: 25.2 PG — LOW (ref 27–34)
MCHC RBC-ENTMCNC: 31.2 G/DL — LOW (ref 32–36)
MCV RBC AUTO: 80.8 FL — SIGNIFICANT CHANGE UP (ref 80–100)
MONOCYTES # BLD AUTO: 0.63 K/UL — SIGNIFICANT CHANGE UP (ref 0–0.9)
MONOCYTES NFR BLD AUTO: 3.4 % — SIGNIFICANT CHANGE UP (ref 2–14)
NEUTROPHILS # BLD AUTO: 16.8 K/UL — HIGH (ref 1.8–7.4)
NEUTROPHILS NFR BLD AUTO: 90 % — HIGH (ref 43–77)
NRBC # BLD AUTO: 0 K/UL — SIGNIFICANT CHANGE UP (ref 0–0)
NRBC # FLD: 0 K/UL — SIGNIFICANT CHANGE UP (ref 0–0)
NRBC BLD AUTO-RTO: 0 /100 WBCS — SIGNIFICANT CHANGE UP (ref 0–0)
PHOSPHATE SERPL-MCNC: 3.3 MG/DL — SIGNIFICANT CHANGE UP (ref 2.5–4.5)
PLATELET # BLD AUTO: 624 K/UL — HIGH (ref 150–400)
POTASSIUM SERPL-MCNC: 4.1 MMOL/L — SIGNIFICANT CHANGE UP (ref 3.5–5.3)
POTASSIUM SERPL-SCNC: 4.1 MMOL/L — SIGNIFICANT CHANGE UP (ref 3.5–5.3)
PROT SERPL-MCNC: 6.5 G/DL — SIGNIFICANT CHANGE UP (ref 6–8.3)
PROTHROM AB SERPL-ACNC: 10.6 SEC — SIGNIFICANT CHANGE UP (ref 9.9–13.4)
RBC # BLD: 4.96 M/UL — SIGNIFICANT CHANGE UP (ref 3.8–5.2)
RBC # FLD: 15 % — HIGH (ref 10.3–14.5)
SODIUM SERPL-SCNC: 132 MMOL/L — LOW (ref 135–145)
WBC # BLD: 18.65 K/UL — HIGH (ref 3.8–10.5)
WBC # FLD AUTO: 18.65 K/UL — HIGH (ref 3.8–10.5)

## 2025-06-04 PROCEDURE — 99285 EMERGENCY DEPT VISIT HI MDM: CPT

## 2025-06-04 PROCEDURE — 99205 OFFICE O/P NEW HI 60 MIN: CPT

## 2025-06-04 PROCEDURE — 93010 ELECTROCARDIOGRAM REPORT: CPT

## 2025-06-04 PROCEDURE — 74177 CT ABD & PELVIS W/CONTRAST: CPT | Mod: 26

## 2025-06-04 PROCEDURE — G2212 PROLONG OUTPT/OFFICE VIS: CPT

## 2025-06-04 PROCEDURE — 71275 CT ANGIOGRAPHY CHEST: CPT | Mod: 26

## 2025-06-04 PROCEDURE — 70450 CT HEAD/BRAIN W/O DYE: CPT | Mod: 26

## 2025-06-04 RX ORDER — ONDANSETRON 8 MG/1
8 TABLET ORAL 3 TIMES DAILY
Qty: 270 | Refills: 3 | Status: ACTIVE | COMMUNITY
Start: 2025-06-04 | End: 1900-01-01

## 2025-06-04 RX ORDER — CAPECITABINE 500 MG/1
500 TABLET, FILM COATED ORAL
Qty: 56 | Refills: 4 | Status: ACTIVE | COMMUNITY
Start: 2025-06-04 | End: 1900-01-01

## 2025-06-04 RX ORDER — DICLOFENAC SODIUM 10 MG/G
1 GEL TOPICAL
Qty: 1 | Refills: 3 | Status: ACTIVE | COMMUNITY
Start: 2025-06-04 | End: 1900-01-01

## 2025-06-04 RX ORDER — CAPECITABINE 150 MG/1
150 TABLET, FILM COATED ORAL
Qty: 56 | Refills: 0 | Status: ACTIVE | COMMUNITY
Start: 2025-06-04 | End: 1900-01-01

## 2025-06-04 RX ORDER — LOPERAMIDE HYDROCHLORIDE 2 MG/1
2 CAPSULE ORAL
Qty: 80 | Refills: 1 | Status: ACTIVE | COMMUNITY
Start: 2025-06-04 | End: 1900-01-01

## 2025-06-04 NOTE — ED ADULT NURSE NOTE - OBJECTIVE STATEMENT
Pt received to room 2 , A&Ox4, ambulatory, Hx Colon Cancer (recently diagnosed 1 week ago), HTN accompanied by family member coming to the ED sent by MD for rule out PE. Pt  c/o sob on exertion. Endorsing worsening abdominal distention, decreased PO tolerance,  and SALDIVAR.  Pt denies chest pain,  fevers, chills, N/V/D, abdominal pain, dizziness, blurry vision, headache, urinary symptoms. Pt pale in appearance,  RR equal and unlabored on room air. placed on cardiac monitor, NSR noted. Abdomen rigid, tender lower quadrants, grossly distended. Neuro intact. 20G IV placed to the L forearm, labs drawn and sent.  Care plan continued. Comfort measures provided. Safety maintained. Awaiting lab results and imaging.

## 2025-06-04 NOTE — ED ADULT TRIAGE NOTE - LOCATION:
Patient isolative to room and self most of the evening.  Patient quiet and keeps to himself. Not seen interacting with anyone. Medication compliant. Behaviors controlled and appropriate. No prn medication requested or required.    Left arm;

## 2025-06-04 NOTE — ED PROVIDER NOTE - PROGRESS NOTE DETAILS
Isai Esteban MD:  Extensive discussion with patient, discussed the results from imaging, no emergent indication for admission at this time, shared decision making engaged, patient saturating 97% on RA while ambulating, no inability to breath on RA and no significant discomfort. No tenderness to palpation of abdomen concerning for peritonitis. Patient prefers outpatient followup with outpatient therapeutic and diagnostic peritoneal tap. Patient with capacity at this time, admission for continued care offered.

## 2025-06-04 NOTE — ED PROVIDER NOTE - CLINICAL SUMMARY MEDICAL DECISION MAKING FREE TEXT BOX
73 yo F with recently diagnosed metastatic adenocarcinoma (unknown primary, suspect colon/GI) who presents to the ED, sent in by oncologist, for evaluation of worsening shortness of breath. Pt with SOB on exertion for the past several weeks and abdominal ascites. On exam, pt cachectic appearing, breathing comfortable and saturating well at rest on room air, lungs CTAB, no LE edema. Differential includes PE, SOB secondary to worsening ascites, metastatic disease, pleural effusion. Plan for labs, CT PE, CT abd/pelvis.

## 2025-06-04 NOTE — ED CLERICAL - NS ED CLERK NOTE PRE-ARRIVAL INFORMATION; ADDITIONAL PRE-ARRIVAL INFORMATION
This patient is enrolled in a total cost of care TCM navigation program and can be followed up by the care navigation team within 24 hours. To arrange close follow-up or to obtain additional clinical information about this patient, please call the contact number above.

## 2025-06-04 NOTE — ED PROVIDER NOTE - CONSTITUTIONAL, MLM
normal... cachectic, awake, alert, oriented to person, place, time/situation and in no apparent distress.

## 2025-06-04 NOTE — ED ADULT TRIAGE NOTE - CHIEF COMPLAINT QUOTE
Pt was diagnosed with colon cancer about a week ago sent in by Dr sravanthi artis for ct scan and further testing.  Pt states has  been feeling sob x few weeks. not started on treatment as of yet. Pt with no co abdominal pain  , abdomen distended

## 2025-06-04 NOTE — ED PROVIDER NOTE - OBJECTIVE STATEMENT
73 yo F with recently diagnosed metastatic adenocarcinoma (unknown primary, suspect colon/GI) who presents to the ED, sent in by oncologist, for evaluation of worsening shortness of breath. Pt reports SOB on exertion, worsening over several weeks. She use to be very active but now getting winded just walking into the hospital from the parking lot. She went to cancer center today for initial appointment and was referred to the ED for evaluation of possible PE. Pt reports that her abdomen is distended and it feels like it is pushing up on her lungs, making the breathing difficulty worse. No fever, chills, cough, chest pain, palpitations, abdominal pain, n/v/d.

## 2025-06-04 NOTE — ED PROVIDER NOTE - NSFOLLOWUPINSTRUCTIONS_ED_ALL_ED_FT
Shortness of breath    Shortness of breath (dyspnea) means you have trouble breathing and could indicate a medical problem. Causes include lung disease, heart disease, low amount of red blood cells (anemia), poor physical fitness, being overweight, smoking, etc. Your health care provider today may not be able to find a cause for your shortness of breath after your exam. In this case, it is important to have a follow-up exam with your primary care physician as instructed. If medicines were prescribed, take them as directed for the full length of time directed. Refrain from tobacco products.    CT findings with Large right and moderate left pleural effusions, increased compared to 5/10/2025, now with complete right lower lobe collapse and adjacent left lower lobe atelectasis. Interval increase in size and number of peritoneal implants and volume of ascites compatible progression of peritoneal   carcinomatosis. Please have pulse oximetry at home, if at any point in time shortness of breath worsens or you have inability to breath please return to the hospital. Please followup with your oncologist for continued care out nonemergent peritoneal fluid removal.    SEEK IMMEDIATE MEDICAL CARE IF YOU HAVE ANY OF THE FOLLOWING SYMPTOMS: worsening shortness of breath, chest pain, back pain, abdominal pain, fever, coughing up blood, lightheadedness/dizziness.

## 2025-06-04 NOTE — ED PROVIDER NOTE - PATIENT PORTAL LINK FT
You can access the FollowMyHealth Patient Portal offered by [facility] by registering at the following website: [email or web address]. By joining [employer]’s FollowMyHealth portal, you will also be able to view your health information using other applications (apps) compatible with our system.

## 2025-06-04 NOTE — ED ADULT NURSE REASSESSMENT NOTE - NS ED NURSE REASSESS COMMENT FT1
Pt remains AxO 3. Pt respirations even and unlabored. pt is sinus tachycardic to 105bpm. Pt denies headaches, dizziness, n/v/d, chest pain, abdominal pain, SOB, or fever like symptoms. pt plan of care ongoing.

## 2025-06-06 ENCOUNTER — NON-APPOINTMENT (OUTPATIENT)
Age: 75
End: 2025-06-06

## 2025-06-07 ENCOUNTER — TRANSCRIPTION ENCOUNTER (OUTPATIENT)
Age: 75
End: 2025-06-07

## 2025-06-09 LAB
ALBUMIN SERPL ELPH-MCNC: 3.4 G/DL
ALP BLD-CCNC: 89 U/L
ALT SERPL-CCNC: 8 U/L
ANION GAP SERPL CALC-SCNC: 23 MMOL/L
APTT BLD: 30.9 SEC
AST SERPL-CCNC: 32 U/L
BILIRUB SERPL-MCNC: 0.3 MG/DL
BUN SERPL-MCNC: 20 MG/DL
CALCIUM SERPL-MCNC: 9.4 MG/DL
CEA SERPL-MCNC: 339 NG/ML
CHLORIDE SERPL-SCNC: 95 MMOL/L
CO2 SERPL-SCNC: 17 MMOL/L
CREAT SERPL-MCNC: 0.7 MG/DL
EGFRCR SERPLBLD CKD-EPI 2021: 91 ML/MIN/1.73M2
FERRITIN SERPL-MCNC: 245 NG/ML
GLUCOSE SERPL-MCNC: 103 MG/DL
HBV SURFACE AB SER QL: NONREACTIVE
HBV SURFACE AG SER QL: NONREACTIVE
HCV AB SER QL: NONREACTIVE
HCV S/CO RATIO: 0.56 S/CO
INR PPP: 0.94 RATIO
IRON SATN MFR SERPL: 7 %
IRON SERPL-MCNC: 15 UG/DL
POTASSIUM SERPL-SCNC: 5 MMOL/L
PROT SERPL-MCNC: 6.1 G/DL
PT BLD: 11.1 SEC
SODIUM SERPL-SCNC: 135 MMOL/L
TIBC SERPL-MCNC: 205 UG/DL
UIBC SERPL-MCNC: 189 UG/DL

## 2025-06-11 PROCEDURE — 88112 CYTOPATH CELL ENHANCE TECH: CPT | Mod: 26

## 2025-06-11 PROCEDURE — 88341 IMHCHEM/IMCYTCHM EA ADD ANTB: CPT | Mod: 26

## 2025-06-11 PROCEDURE — 88342 IMHCHEM/IMCYTCHM 1ST ANTB: CPT | Mod: 26

## 2025-06-11 PROCEDURE — 88305 TISSUE EXAM BY PATHOLOGIST: CPT | Mod: 26

## 2025-06-12 LAB
DPYD GENOTYPE: NORMAL
DPYD INTERPRETATION: NORMAL
DPYD PHENOTYPE: NORMAL
DPYD SPECIMEN: NORMAL
PATHOLOGY STUDY: NORMAL

## 2025-06-16 NOTE — ED PROVIDER NOTE - PROGRESS NOTE DETAILS
Rosemarie Mendoza DO, (PGY2): pressure continues to drop s/p 2L will trial 1 more liter and start pressors if pt's bp does not respond Rosemarie Mendoza DO, (PGY2): Levo started. Rosemarie Mendoza DO, (PGY2): family at bedside. discussed at length ed course and plan to start pressors. Rosemarie Mendoza DO, (PGY2): micu consulted

## 2025-06-16 NOTE — ED PROVIDER NOTE - OBJECTIVE STATEMENT
74-year-old female past medical history of colon cancer stage IV brought in by EMS from home status post wellness check.  Per EMS daughter had not heard from her mother in 2 days and called for wellness check when EMS arrived she was found surrounded by feces on her bed.  Patient found to be hypotensive and appeared very drowsy.  And route patient received IV fluids about 200 cc.  At the time of my assessment patient is not endorsing any pain.  She does state she had a stomachache last night and multiple episodes of diarrhea.  She denies any falls or trauma to her head.  Patient appears very weak.  Rectal temp taken at bedside 92.4.  POCUS performed at bedside showed no obvious effusions.  IVC collapsible.  No B-lines noted on pulmonary exam.

## 2025-06-16 NOTE — H&P ADULT - NSHPLABSRESULTS_GEN_ALL_CORE
LABS:                        13.8   13.46 )-----------( 344      ( 16 Jun 2025 12:16 )             44.4     06-16    131[L]  |  97[L]  |  26[H]  ----------------------------<  113[H]  5.0   |  17[L]  |  1.18    Ca    7.7[L]      16 Jun 2025 13:46  Phos  4.4     06-16  Mg     1.90     06-16    TPro  3.9[L]  /  Alb  1.7[L]  /  TBili  0.3  /  DBili  x   /  AST  23  /  ALT  6   /  AlkPhos  48  06-16    PT/INR - ( 16 Jun 2025 13:46 )   PT: 14.9 sec;   INR: 1.25 ratio         PTT - ( 16 Jun 2025 13:46 )  PTT:31.4 sec      Urinalysis Basic - ( 16 Jun 2025 13:46 )    Color: x / Appearance: x / SG: x / pH: x  Gluc: 113 mg/dL / Ketone: x  / Bili: x / Urobili: x   Blood: x / Protein: x / Nitrite: x   Leuk Esterase: x / RBC: x / WBC x   Sq Epi: x / Non Sq Epi: x / Bacteria: x    < from: CT Abdomen and Pelvis w/ IV Cont (06.16.25 @ 14:05) >    IMPRESSION:  1.  Interval increase in bilateral large pleural effusions.  2.  Peritoneal and serosal implants demonstrate mild interval increase in   size, however there is significant decrease in attenuation. Findings   suggestive of posttreatment changes.  3.  New diffuse mural thickening of the GI tract, suggestive of   gastroenterocolitis.  4.  Mild interval decrease of large ascites with trace pneumoperitoneum,   representing changes from prior paracentesis.    < end of copied text >

## 2025-06-16 NOTE — ED PROVIDER NOTE - PHYSICAL EXAMINATION
Physical Exam:  Gen: weak appearing, frail  Head: NCAT  HEENT: Normal conjunctiva, trachea midline, moist mucous membranes  Lung:  speaking in full sentences  CV: RRR,   Abd: Soft, midly distended  MSK: No visible deformities, moves all four extremities  Neuro: No focal motor deficits  Skin: Warm, well perfused,

## 2025-06-16 NOTE — ED ADULT NURSE NOTE - OBJECTIVE STATEMENT
received patient # 18 as notification , patient  brought in by EMS hypotensive , alert ox3 states she is weak and unable to get up , patient is covered in dried  feces from waist  down , feces on her arms , under nails, appears cachectic , denies CP, states had a belly tap done on Thursday , denies abdominal pain.placed on CM shows NSR. on 3 Liters of O2 via NC. 18G saline lock inserted unable to get lab draw. several attempts made to draw labs , minimal blood obtained. provider Verna notified. meds given as ordered. patient cleaned and made comfortable. Juan linares placed . family by bed side. skin warm and d ry, will continue to monitor.

## 2025-06-16 NOTE — ED ADULT NURSE REASSESSMENT NOTE - NS ED NURSE REASSESS COMMENT FT1
Float RN: Pt A&Ox4,  resting in stretcher. Pt denies chest pain, sob, abd pain, ha, dizziness, n/v/d, fevers/chills at this time. On cardiac monitor. Started on Levo gtt by break RN at 0.05mcg/kg/min. 3 PIVS in placed with + blood return. Remains on bear huggar
break coverage rn. received report from BHASKAR taylor. pt A&Ox4, BP noted to have no improvement while receiviung 3rd liter of IV fluids, pt BP in 70s/50s with MAP <65. pt denies headache ,dizziness, nausea, vomiting ,chest pain, SOB. satting 97% on room air. pending MICU consult. pt noted to have multiple IVs, patent and flushing well no infiltration noted. pt noted to be on jonathan hugger due to hypothermia. pt offers no complaints at this time. family at bedside aware pt still pending MICU consult. safety maintained. respirations even unlabored, abdomen soft, pedal pulses 2+ bilaterally.
break coverage rn. received report from BHASKAR taylor. pt A&Ox4, noted to be hypotensive despite receiving 3rd liter of IVF. MD Braxton made aware and pt placed on levophed. MICU team at bedside and patient accepted to MICU. pt aware for reasoning of jonathan linares at this time. Oxana wyatt at bedside for titration of levo, report given to oxana stephens. safety maintained. pending availbility of MICU bed
patient is hypotensive ,hypothermic. provider made aware. IVF bolus started and infusing well. patient denies any chest pain/ will continue to monitor.
provider by bed side trying for US IV and lab draw.
patient is cleaned by 2 people , warm blanket applied. meds given as ordered.
patient went to CT and returned, placed back on Juan bao . VS as noted. patient has US guided IV to RUE, labs sent as ordered. pending results. daughters by bed side. will reassess.

## 2025-06-16 NOTE — PROCEDURE NOTE - NSNEEDLEGAUGE_GEN_A_CORE
Clinic Administered Medication Documentation    Administrations This Visit     leuprolide (LUPRON DEPOT) kit 22.5 mg     Admin Date  08/19/2022 Action  Given Dose  22.5 mg Route  Intramuscular Site  Right Ventrogluteal Administered By  Donna Morin, RN    Ordering Provider: Medardo Ingram MD    Patient Supplied?: No                  Injectable Medication Documentation    Patient was given Depot Lupron. Prior to medication administration, verified patients identity using patient s name and date of birth. Please see MAR and medication order for additional information. Patient instructed to remain in clinic for 15 minutes and report any adverse reaction to staff immediately .      Was entire vial of medication used? Yes  Vial/Syringe: Syringe  Expiration Date:  11/02/2024  Was this medication supplied by the patient? No     LOT: [id number]  EXP: 11/02/2024  NDC: 2922-0969-58    One remaining CAM order for November. Will need new CAM orders for 2023.    TRACIE Boyer, RN  [place]  August 19, 2022      
I/O needle
20

## 2025-06-16 NOTE — H&P ADULT - NSCORESITESY/N_GEN_A_CORE_RD
Benzoyl Peroxide Pregnancy And Lactation Text: This medication is Pregnancy Category C. It is unknown if benzoyl peroxide is excreted in breast milk.
Sarecycline Pregnancy And Lactation Text: This medication is Pregnancy Category D and not consider safe during pregnancy. It is also excreted in breast milk.
Topical Clindamycin Counseling: Patient counseled that this medication may cause skin irritation or allergic reactions.  In the event of skin irritation, the patient was advised to reduce the amount of the drug applied or use it less frequently.   The patient verbalized understanding of the proper use and possible adverse effects of clindamycin.  All of the patient's questions and concerns were addressed.
Erythromycin Counseling:  I discussed with the patient the risks of erythromycin including but not limited to GI upset, allergic reaction, drug rash, diarrhea, increase in liver enzymes, and yeast infections.
Sarecycline Counseling: Patient advised regarding possible photosensitivity and discoloration of the teeth, skin, lips, tongue and gums.  Patient instructed to avoid sunlight, if possible.  When exposed to sunlight, patients should wear protective clothing, sunglasses, and sunscreen.  The patient was instructed to call the office immediately if the following severe adverse effects occur:  hearing changes, easy bruising/bleeding, severe headache, or vision changes.  The patient verbalized understanding of the proper use and possible adverse effects of sarecycline.  All of the patient's questions and concerns were addressed.
Detail Level: Zone
Erythromycin Pregnancy And Lactation Text: This medication is Pregnancy Category B and is considered safe during pregnancy. It is also excreted in breast milk.
Topical Sulfur Applications Pregnancy And Lactation Text: This medication is Pregnancy Category C and has an unknown safety profile during pregnancy. It is unknown if this topical medication is excreted in breast milk.
Use Enhanced Medication Counseling?: No
Dapsone Counseling: I discussed with the patient the risks of dapsone including but not limited to hemolytic anemia, agranulocytosis, rashes, methemoglobinemia, kidney failure, peripheral neuropathy, headaches, GI upset, and liver toxicity.  Patients who start dapsone require monitoring including baseline LFTs and weekly CBCs for the first month, then every month thereafter.  The patient verbalized understanding of the proper use and possible adverse effects of dapsone.  All of the patient's questions and concerns were addressed.
Tetracycline Counseling: Patient counseled regarding possible photosensitivity and increased risk for sunburn.  Patient instructed to avoid sunlight, if possible.  When exposed to sunlight, patients should wear protective clothing, sunglasses, and sunscreen.  The patient was instructed to call the office immediately if the following severe adverse effects occur:  hearing changes, easy bruising/bleeding, severe headache, or vision changes.  The patient verbalized understanding of the proper use and possible adverse effects of tetracycline.  All of the patient's questions and concerns were addressed. Patient understands to avoid pregnancy while on therapy due to potential birth defects.
Bactrim Pregnancy And Lactation Text: This medication is Pregnancy Category D and is known to cause fetal risk.  It is also excreted in breast milk.
Bactrim Counseling:  I discussed with the patient the risks of sulfa antibiotics including but not limited to GI upset, allergic reaction, drug rash, diarrhea, dizziness, photosensitivity, and yeast infections.  Rarely, more serious reactions can occur including but not limited to aplastic anemia, agranulocytosis, methemoglobinemia, blood dyscrasias, liver or kidney failure, lung infiltrates or desquamative/blistering drug rashes.
Doxycycline Counseling:  Patient counseled regarding possible photosensitivity and increased risk for sunburn.  Patient instructed to avoid sunlight, if possible.  When exposed to sunlight, patients should wear protective clothing, sunglasses, and sunscreen.  The patient was instructed to call the office immediately if the following severe adverse effects occur:  hearing changes, easy bruising/bleeding, severe headache, or vision changes.  The patient verbalized understanding of the proper use and possible adverse effects of doxycycline.  All of the patient's questions and concerns were addressed.
Topical Retinoid Pregnancy And Lactation Text: This medication is Pregnancy Category C. It is unknown if this medication is excreted in breast milk.
Topical Sulfur Applications Counseling: Topical Sulfur Counseling: Patient counseled that this medication may cause skin irritation or allergic reactions.  In the event of skin irritation, the patient was advised to reduce the amount of the drug applied or use it less frequently.   The patient verbalized understanding of the proper use and possible adverse effects of topical sulfur application.  All of the patient's questions and concerns were addressed.
Isotretinoin Counseling: Patient should get monthly blood tests, not donate blood, not drive at night if vision affected, not share medication, and not undergo elective surgery for 6 months after tx completed. Side effects reviewed, pt to contact office should one occur.
Birth Control Pills Counseling: Birth Control Pill Counseling: I discussed with the patient the potential side effects of OCPs including but not limited to increased risk of stroke, heart attack, thrombophlebitis, deep venous thrombosis, hepatic adenomas, breast changes, GI upset, headaches, and depression.  The patient verbalized understanding of the proper use and possible adverse effects of OCPs. All of the patient's questions and concerns were addressed.
Minocycline Counseling: Patient advised regarding possible photosensitivity and discoloration of the teeth, skin, lips, tongue and gums.  Patient instructed to avoid sunlight, if possible.  When exposed to sunlight, patients should wear protective clothing, sunglasses, and sunscreen.  The patient was instructed to call the office immediately if the following severe adverse effects occur:  hearing changes, easy bruising/bleeding, severe headache, or vision changes.  The patient verbalized understanding of the proper use and possible adverse effects of minocycline.  All of the patient's questions and concerns were addressed.
Azithromycin Counseling:  I discussed with the patient the risks of azithromycin including but not limited to GI upset, allergic reaction, drug rash, diarrhea, and yeast infections.
No
Tazorac Pregnancy And Lactation Text: This medication is not safe during pregnancy. It is unknown if this medication is excreted in breast milk.
High Dose Vitamin A Counseling: Side effects reviewed, pt to contact office should one occur.
Birth Control Pills Pregnancy And Lactation Text: This medication should be avoided if pregnant and for the first 30 days post-partum.
Isotretinoin Pregnancy And Lactation Text: This medication is Pregnancy Category X and is considered extremely dangerous during pregnancy. It is unknown if it is excreted in breast milk.
Topical Retinoid counseling:  Patient advised to apply a pea-sized amount only at bedtime and wait 30 minutes after washing their face before applying.  If too drying, patient may add a non-comedogenic moisturizer. The patient verbalized understanding of the proper use and possible adverse effects of retinoids.  All of the patient's questions and concerns were addressed.
Spironolactone Counseling: Patient advised regarding risks of diarrhea, abdominal pain, hyperkalemia, birth defects (for female patients), liver toxicity and renal toxicity. The patient may need blood work to monitor liver and kidney function and potassium levels while on therapy. The patient verbalized understanding of the proper use and possible adverse effects of spironolactone.  All of the patient's questions and concerns were addressed.
Topical Clindamycin Pregnancy And Lactation Text: This medication is Pregnancy Category B and is considered safe during pregnancy. It is unknown if it is excreted in breast milk.
Spironolactone Pregnancy And Lactation Text: This medication can cause feminization of the male fetus and should be avoided during pregnancy. The active metabolite is also found in breast milk.
Dapsone Pregnancy And Lactation Text: This medication is Pregnancy Category C and is not considered safe during pregnancy or breast feeding.
Azithromycin Pregnancy And Lactation Text: This medication is considered safe during pregnancy and is also secreted in breast milk.
High Dose Vitamin A Pregnancy And Lactation Text: High dose vitamin A therapy is contraindicated during pregnancy and breast feeding.
Tazorac Counseling:  Patient advised that medication is irritating and drying.  Patient may need to apply sparingly and wash off after an hour before eventually leaving it on overnight.  The patient verbalized understanding of the proper use and possible adverse effects of tazorac.  All of the patient's questions and concerns were addressed.
Doxycycline Pregnancy And Lactation Text: This medication is Pregnancy Category D and not consider safe during pregnancy. It is also excreted in breast milk but is considered safe for shorter treatment courses.
Benzoyl Peroxide Counseling: Patient counseled that medicine may cause skin irritation and bleach clothing.  In the event of skin irritation, the patient was advised to reduce the amount of the drug applied or use it less frequently.   The patient verbalized understanding of the proper use and possible adverse effects of benzoyl peroxide.  All of the patient's questions and concerns were addressed.

## 2025-06-16 NOTE — PATIENT PROFILE ADULT - DO YOU FEEL LIKE HURTING YOURSELF OR OTHERS?
Called patient to assess symptoms noted in mychart message. Left voicemail to return call to the office.    no

## 2025-06-16 NOTE — PATIENT PROFILE ADULT - HAVE YOU RECENTLY LOST WEIGHT WITHOUT TRYING?
Yes... V-Y Flap Text: The defect edges were debeveled with a #15 scalpel blade.  Given the location of the defect, shape of the defect and the proximity to free margins a V-Y flap was deemed most appropriate.  Using a sterile surgical marker, an appropriate advancement flap was drawn incorporating the defect and placing the expected incisions within the relaxed skin tension lines where possible.    The area thus outlined was incised deep to adipose tissue with a #15 scalpel blade.  The skin margins were undermined to an appropriate distance in all directions utilizing iris scissors.

## 2025-06-16 NOTE — ED PROVIDER NOTE - CLINICAL SUMMARY MEDICAL DECISION MAKING FREE TEXT BOX
74-year-old female past medical history of colon cancer stage IV brought in by EMS from home status post wellness check.  Per EMS daughter had not heard from her mother in 2 days and called for wellness check when EMS arrived she was found surrounded by feces on her bed.  Patient found to be hypotensive and appeared very drowsy.  And route patient received IV fluids about 200 cc.  At the time of my assessment patient is not endorsing any pain.  She does state she had a stomachache last night and multiple episodes of diarrhea.  She denies any falls or trauma to her head.  Patient appears very weak.  Rectal temp taken at bedside 92.4.  POCUS performed at bedside showed no obvious effusions.  IVC collapsible.  No B-lines noted on pulmonary exam.  will order a bear hugger, sepsis protocol, viral swab, ua. urine culture, start IVF, abx and reassess

## 2025-06-16 NOTE — H&P ADULT - HISTORY OF PRESENT ILLNESS
74-year-old female past medical history of colon cancer stage IV brought in by EMS from home status post wellness check.  Per EMS daughter had not heard from her mother in 2 days and called for wellness check when EMS arrived she was found surrounded by feces on her bed.  Patient found to be hypotensive and appeared very drowsy.  And route patient received IV fluids about 200 cc.  At the time of my assessment patient is not endorsing any pain.  She does state she had a stomachache last night and multiple episodes of diarrhea.  She denies any falls or trauma to her head.  Patient appears very weak.  Rectal temp taken at bedside 92.4.  POCUS performed at bedside showed no obvious effusions.  IVC collapsible.  No B-lines noted on pulmonary exam.    Notable for temp of 92.4F, MAP 58 which improved to 80s-90s after fluids but dropped again. On 4L NC.   WBC of 13.46 with bands, Cr 1.18 (up from 0.68 on 6/4), Albumin 1.7 (down from 3.1 on 6/4), VBG with pH 7.19, pCO2 32, lactate 10.8->8.8,   In the ED received, 3L LR, 1g Vanc, 1g Cefepime,     CT a/p with interval increase in bilateral large pleural effusions, new diffuse mural thickening of the GI tract, suggestive of gastroenterocolitis, mild interval decrease of large ascites with trace pneumoperitoneum, representing changes from prior paracentesis.

## 2025-06-16 NOTE — H&P ADULT - ASSESSMENT
ASSESSMENT  GREG PELAYO is a 74y female with PMH colon cancer stage IV and HTN who was admitted for sepsis and hypotension on norepinephrine    PLAN  =====Neurologic=====  Patient is AOx4    =====Pulmonary=====  Patient breathing comfortably on room air    =====Cardiovascular=====  #Hypotension  #Sepsis  - On norepinephrine     #Hypothermia  - monitor temperature  - warming blanket    #HTN  - hold home med telmisartan-hydrochlorothiazide 80 mg-12.5 mg qd iso septic shock  - pending TTE     =====GI=====  #Abdominal Pain  #Diarrhea  #Ascites  Concern for gastroenterisis vs peritonitis. CT showing changes consistent with prior paracentesis when compared to 6/4/25 scans.  - s/p vanc and cefepime x1 in ED  - zosyn (6/16-  - f/u GI PCR  - f/u Stool culture  - Diagnostic paracentesis for concern for SBP. Patient had a paracentesis on 6/11 but patient still complaining of tender abdomen,  --> US paracentesis note from 6/11 with 6550L removed.   --> But no lab records in system/HIE showing results of testing on para-fluid    #Diet  - Full diet    =====Renal/=====  #IVORY  - Likely prerenal   - Cr 1.18 (up from 0.68 on 6/4)  - Monitor Cr    #Electrolytes  - Maintain K>4, Phos>3, Mag>2, iCal>1    =====Endocrine=====  NA    =====Infectious Disease=====  #Septic shock  #Hypotensive and hypothermic  - f/u BCx  - f/u UCx  - full RVP  - s/p vanc and cefepime x1 in ED  - Zosyn (6/16-    =====Heme/Onc=====  #DVT Ppx  - Heparin SQ q12h    =====Ethics=====  FULL CODE

## 2025-06-16 NOTE — PROCEDURE NOTE - SUPERVISORY STATEMENT
pt tolerated the procedure well.  cxr reviewed cather in [facility]
pt tolerated the procedure well

## 2025-06-16 NOTE — ED PROVIDER NOTE - ATTENDING CONTRIBUTION TO CARE
74F h/o recently diagnosed stage IV colon Ca on oral chemo BIBEMS after daughter called for wellness check. EMS found patient on floor of bedroom with large volume feces surrounding her. At that time she was profoundly hypotensive and altered but after receiving IVF with EMS en route they noted her mentation improved and BP slowly uptrending. In ED, pt reported multiple episodes of severe diarrhea starting the night prior. Daughter later noted that since starting the oral chemo she has been suffering from frequent episodes of diarrhea, thought to be a side effect. Pt currently denies abdominal pain, n/v, headache, neck/back pain, fever/chills or other complaints.   GEN: awake and alert, frail, thin  HEENT: dry MM   CV: tachy but regular  PULM: clear lungs but diminished at bases  ABD: soft, slight distension, dressing over recent paracentesis site  EXT: no gross deformities, moving all freely, no edema/swelling  SKIN: warm, no rash/petechiae/ecchymosis/abrasions  MDM: 74F h/o recently diagnosed stage IV colon Ca on oral chemo BIBEMS after daughter called for wellness check and found hypotensive and altered. POCUS performed on arrival showing signs of hypovolemia which could be leading cause of her hypotesnion in setting of profound diarrhea, however, given hypothermia and active chemo will perform full sepsis workup including blood and urine cultures. Will get CXR and CT ab/pel to assess for other sources of infection. Empiric abx with vanc and cefepime. CBC to assess for neutropenia, CMP for electrolytes and renal function. 2L IVF ordered now, will reassess volume status post-boluses. If appears euvolemic and still hypotensive will require pressor support. Daughter and sister updated at bedside. Pt full code.    Upon my evaluation, this patient is at high risk for imminent or life threatening deterioration due to shock state (septic vs hypovolemic) and other active medical issues which require my direct attention, frequent reassessment, intervention, and personal management.  I have personally spent   65    discontinuous minutes  of critical care time exclusive of time spent on separate billing procedures. This includes review of laboratory data, radiology results, discussion with primary team, and monitoring for potential decompensation. Interventions were performed as documented above.

## 2025-06-16 NOTE — PROCEDURE NOTE - ADDITIONAL PROCEDURE DETAILS
Pt rylee-arrest on high dose vasopressors requiring invasive strict BP monitoring.  Pt tolerated procedure well with no immediate complications.
Pt with high dose vasopressor requirements necessitating central access.  Bilateral lung sliding noted pre and post procedure.  Guidewire noted in RIJ prior to dilation.  CXR reviewed.  Pt tolerated procedure well with no immediate complications.
Pt rylee-arrest with extravasated PIVs running high dose levophed requiring emergent access.

## 2025-06-16 NOTE — PROCEDURE NOTE - NSPROCDETAILS_GEN_ALL_CORE
guidewire recovered/lumen(s) aspirated and flushed/sterile dressing applied/ultrasound guidance with use of sterile gel and probe cove
location identified, draped/prepped, sterile technique used, needle inserted/introduced/positive blood return obtained via catheter/connected to a pressurized flush line/sutured in place/hemostasis with direct pressure, dressing applied/Seldinger technique/all materials/supplies accounted for at end of procedure
location identified, draped/prepped, sterile technique used/lumen(s) aspirated and flushed/sterile dressing applied

## 2025-06-16 NOTE — ED ADULT NURSE NOTE - NSFALLUNIVINTERV_ED_ALL_ED
Bed/Stretcher in lowest position, wheels locked, appropriate side rails in place/Call bell, personal items and telephone in reach/Instruct patient to call for assistance before getting out of bed/chair/stretcher/Non-slip footwear applied when patient is off stretcher/[place] to call system/Physically safe environment - no spills, clutter or unnecessary equipment/Purposeful proactive rounding/Room/bathroom lighting operational, light cord in reach

## 2025-06-16 NOTE — PROCEDURE NOTE - NSINFORMCONSENT_GEN_A_CORE
Pt, Daughter, Son/Benefits, risks, and possible complications of procedure explained to patient/caregiver who verbalized understanding and gave verbal consent.
This was an emergent procedure.
This was an emergent procedure.

## 2025-06-16 NOTE — H&P ADULT - ATTENDING COMMENTS
pt is a 75 yo male with hx colon ca stage lV with mets to the peritoneum,  pt had  chemo session last weak, now presents with daughter with lethargy and  weakness.  In the ERpt noted to be hypotensive bp in the 80's,   Discussed with  daughter  ROS no cp, sob, n, v, headaches, blurry vision, per daughter episodes  of diarrhea and  abdominal pain, no melena, hematuria, dysuria,  PE bp 84/56  rr 18 heent no jvd, lungs dec bs abd mild tenderness periumbilical  ext cachectic muscle wasting, neuro alert and awake,     I reviewed ct scan  chest showing bl pleural effusion and abdomen showing   enteritis    wbc 13 hgb 14 hct 44   bands 10%   bicarb 17 cr 1.1      A/P  75 yo female with colon ca with shock hypotension not responding]  to iv fluid  -will start norepinephrine for bp support map 65  -panculture, blood, urine,   check gi pcr  -continue maintanance ivf     LR at 100 cc/hr  -check cortisol, tsh, procalcitonin,   -ech to evaluate lv function  -dvt prophylaxis  -monitor urine output closely  -fu lactate   -heme onc evaluation   -critically ill with shock and hypotension, will admit  to icu for close monitorin pt is a 75 yo male with hx colon ca stage lV with mets to the peritoneum,  pt had  chemo session last weak, now presents with daughter with lethargy and  weakness.  In the ERpt noted to be hypotensive bp in the 80's,   Discussed with  daughter  ROS no cp, sob, n, v, headaches, blurry vision, per daughter episodes  of diarrhea and  abdominal pain, no melena, hematuria, dysuria,  PE bp 84/56  rr 18 heent no jvd, lungs dec bs abd mild tenderness periumbilical  ext cachectic muscle wasting, neuro alert and awake,     I reviewed ct scan  chest showing bl pleural effusion and abdomen showing   enteritis    wbc 13 hgb 14 hct 44   bands 10%   bicarb 17 cr 1.1      A/P  75 yo female with colon ca with shock hypotension not responding]  to iv fluid  -will  continue  norepinephrine  and titrate for bp support map 65  -panculture, blood, urine,   check gi pcr  -continue maintanance ivf     LR at 100 cc/hr  -check cortisol, tsh, procalcitonin,   -ech to evaluate lv function  -dvt prophylaxis  -monitor urine output closely  -fu lactate   -heme onc evaluation   -critically ill with shock and hypotension, will admit  to icu for close monitorin

## 2025-06-16 NOTE — ED PROCEDURE NOTE - ATTENDING CONTRIBUTION TO CARE
Difficult access obtained via US guidance with resident as above. I was present for all key portions of procedure.

## 2025-06-17 NOTE — SWALLOW BEDSIDE ASSESSMENT ADULT - ASR SWALLOW RECOMMEND DIAG
2. Objective testing not indicated at this time, given overt s/s airway penetration/aspiration evidenced with most conservative diet consistencies (i.e., puree/moderately-thick liquids).

## 2025-06-17 NOTE — PHARMACOTHERAPY INTERVENTION NOTE - COMMENTS
Medication history is complete. Medication list updated in Outpatient Medication Record (OMR). Please call spectra [phone] if you have any questions. Sources: [facility], [facility], [facility]    Home Medications:  capecitabine 150 mg oral tablet: 2 tab(s) orally 2 times a day x 2 weeks, then 1 week off (17 Jun 2025 14:42)  capecitabine 500 mg oral tablet: 2 tab(s) orally 2 times a day x 2 weeks, then 1 week off (17 Jun 2025 14:42)  diclofenac 1% topical gel: Apply topically to affected area to prevent hand and feet syndrome (17 Jun 2025 14:52)  hydroCHLOROthiazide 12.5 mg oral capsule: 1 cap(s) orally once a day (17 Jun 2025 14:42)  loperamide 2 mg oral capsule: 2 cap(s) orally at first diarrheal bowel movement, then 1 capsule each additional bowel movement. (17 Jun 2025 14:42)  omeprazole 40 mg oral delayed release capsule: 1 cap(s) orally once a day (17 Jun 2025 14:42)  ondansetron 8 mg oral tablet: 1 tab(s) orally 3 times a day as needed for , post chemo nausea (17 Jun 2025 14:42)  telmisartan 80 mg oral tablet: 1 tab(s) orally once a day (17 Jun 2025 14:42)

## 2025-06-17 NOTE — SWALLOW BEDSIDE ASSESSMENT ADULT - MUCOSAL QUALITY
A Care Transition RN will be following this patient at discharge for 14 days due to a positive COVID-19 diagnosis.     Patient with mouth-open posture at rest; Noted with dried oral mucosa (labial surfaces; lingual surface); RN notified. [place] utilized to ensure adequate oral moisture prior to initiation of PO trials. Recommend ongoing frequent & meticulous oral hygiene.

## 2025-06-17 NOTE — DIETITIAN INITIAL EVALUATION ADULT - NS FNS DIET ORDER
Diet, NPO:   Except Medications     Special Instructions for Nursing:  Except Medications (06-16-25 @ 17:55)

## 2025-06-17 NOTE — DIETITIAN INITIAL EVALUATION ADULT - PERTINENT MEDS FT
MEDICATIONS  (STANDING):  chlorhexidine 2% Cloths 1 Application(s) Topical <User Schedule>  dexMEDEtomidine Infusion 0.1 MICROgram(s)/kG/Hr (1.14 mL/Hr) IV Continuous <Continuous>  heparin   Injectable 5000 Unit(s) SubCutaneous every 12 hours  hydrocortisone sodium succinate Injectable 50 milliGRAM(s) IV Push every 6 hours  lactated ringers. 1000 milliLiter(s) (75 mL/Hr) IV Continuous <Continuous>  norepinephrine Infusion 0.7 MICROgram(s)/kG/Min (29.8 mL/Hr) IV Continuous <Continuous>  piperacillin/tazobactam IVPB.. 3.375 Gram(s) IV Intermittent every 8 hours  vasopressin Infusion 0.04 Unit(s)/Min (6 mL/Hr) IV Continuous <Continuous>    MEDICATIONS  (PRN):

## 2025-06-17 NOTE — SWALLOW BEDSIDE ASSESSMENT ADULT - ADDITIONAL RECOMMENDATIONS
Medical team advised to reconsult this service as patient is medically optimized. This service to follow to clinically re-assess swallow function/candidacy for safe oral intake program as schedule permits.

## 2025-06-17 NOTE — SWALLOW BEDSIDE ASSESSMENT ADULT - SWALLOW EVAL: PROGNOSIS
(continued) Patient with overt s/s airway penetration/aspiration (delayed cough and/or throat clear) evidenced following trials/single teaspoons of moderately-thick liquids (which was judged to be clinically weak). Patient further with multiple (3-4 swallows) per bolus presentation of moderately-thick liquids/puree, which may be indicative of pharyngeal clearance deficits.

## 2025-06-17 NOTE — DIETITIAN INITIAL EVALUATION ADULT - ORAL INTAKE PTA/DIET HISTORY
Per daughter, Pt. with variable PO intake PTA, since starting chemo recently.  Pt. generally eats soft solids.  Does not drink any nutritional shakes or take any vitamins/mineral/herbal supplements.  NKFA.

## 2025-06-17 NOTE — SWALLOW BEDSIDE ASSESSMENT ADULT - SWALLOW EVAL: DIAGNOSIS
1. Moderate oral stage for puree and moderately-thick liquids as characterized by reduced oral acceptance/oral stripping of bolus presentations from teaspoon/utensil, adequate oral containment, slow bolus manipulation for puree, slow anterior-posterior transfer, and reduced oral clearance with mild oral/lingual surface stasis post primary-swallow for puree, which cleared with liquid wash with moderately-thick liquids. 2. Moderate-severe pharyngeal stage for aforementioned consistencies as characterized by suspected delay in initiation of the pharyngeal swallow trigger and present, although subjectively reduced, hyolaryngeal excursion upon digital palpation...

## 2025-06-17 NOTE — PROGRESS NOTE ADULT - SUBJECTIVE AND OBJECTIVE BOX
CHIEF COMPLAINT:    Interval Events:    HPI:  74-year-old female past medical history of colon cancer stage IV brought in by EMS from home status post wellness check.  Per EMS daughter had not heard from her mother in 2 days and called for wellness check when EMS arrived she was found surrounded by feces on her bed.  Patient found to be hypotensive and appeared very drowsy.  And route patient received IV fluids about 200 cc.  At the time of my assessment patient is not endorsing any pain.  She does state she had a stomachache last night and multiple episodes of diarrhea.  She denies any falls or trauma to her head.  Patient appears very weak.  Rectal temp taken at bedside 92.4.  POCUS performed at bedside showed no obvious effusions.  IVC collapsible.  No B-lines noted on pulmonary exam.    Notable for temp of 92.4F, MAP 58 which improved to 80s-90s after fluids but dropped again. On 4L NC.   WBC of 13.46 with bands, Cr 1.18 (up from 0.68 on 6/4), Albumin 1.7 (down from 3.1 on 6/4), VBG with pH 7.19, pCO2 32, lactate 10.8->8.8,   In the ED received, 3L LR, 1g Vanc, 1g Cefepime,     CT a/p with interval increase in bilateral large pleural effusions, new diffuse mural thickening of the GI tract, suggestive of gastroenterocolitis, mild interval decrease of large ascites with trace pneumoperitoneum, representing changes from prior paracentesis. (16 Jun 2025 18:47)      REVIEW OF SYSTEMS:  Constitutional: [ ] negative [ ] fevers [ ] chills [ ] weight loss [ ] weight gain  HEENT: [ ] negative [ ] dry eyes [ ] eye irritation [ ] postnasal drip [ ] nasal congestion  CV: [ ] negative  [ ] chest pain [ ] orthopnea [ ] palpitations [ ] murmur  Resp: [ ] negative [ ] cough [ ] shortness of breath [ ] dyspnea [ ] wheezing [ ] sputum [ ] hemoptysis  GI: [ ] negative [ ] nausea [ ] vomiting [ ] diarrhea [ ] constipation [ ] abd pain [ ] dysphagia   : [ ] negative [ ] dysuria [ ] nocturia [ ] hematuria [ ] increased urinary frequency  Musculoskeletal: [ ] negative [ ] back pain [ ] myalgias [ ] arthralgias [ ] fracture  Skin: [ ] negative [ ] rash [ ] itch  Neurological: [ ] negative [ ] headache [ ] dizziness [ ] syncope [ ] weakness [ ] numbness  Psychiatric: [ ] negative [ ] anxiety [ ] depression  Endocrine: [ ] negative [ ] diabetes [ ] thyroid problem  Hematologic/Lymphatic: [ ] negative [ ] anemia [ ] bleeding problem  Allergic/Immunologic: [ ] negative [ ] itchy eyes [ ] nasal discharge [ ] hives [ ] angioedema  [ ] All other systems negative  [ ] Unable to assess ROS because ________    OBJECTIVE:  ICU Vital Signs Last 24 Hrs  T(C): 36.7 (17 Jun 2025 00:00), Max: 36.7 (17 Jun 2025 00:00)  T(F): 98.1 (17 Jun 2025 00:00), Max: 98.1 (17 Jun 2025 00:00)  HR: 102 (17 Jun 2025 05:00) (88 - 132)  BP: 142/127 (16 Jun 2025 21:00) (67/53 - 142/127)  BP(mean): 133 (16 Jun 2025 21:00) (58 - 133)  ABP: 116/68 (17 Jun 2025 05:00) (38/29 - 153/67)  ABP(mean): 86 (17 Jun 2025 05:00) (35 - 105)  RR: 32 (17 Jun 2025 05:00) (20 - 41)  SpO2: 99% (17 Jun 2025 05:00) (85% - 99%)    O2 Parameters below as of 17 Jun 2025 05:00  Patient On (Oxygen Delivery Method): nasal cannula w/ humidification    O2 Concentration (%): 6          06-16 @ 07:01  -  06-17 @ 06:35  --------------------------------------------------------  IN: 1705.9 mL / OUT: 0 mL / NET: 1705.9 mL      CAPILLARY BLOOD GLUCOSE      POCT Blood Glucose.: 153 mg/dL (17 Jun 2025 05:31)      Physical Exam:   Constitutional: ill appearing, no acute distress   HEENT: + PERRLA, EOMI, no drainage or redness  Neck: supple,  No JVD  Respiratory: Ventilator assisted breath Sounds equal & clear bilaterally to auscultation, no accessory muscle use noted  Cardiovascular: Regular rate, regular rhythm, normal S1, S2; no murmurs or rub  Gastrointestinal: Soft, non-tender, non distended, no hepatosplenomegaly, normal bowel sounds  Extremities: + 2 peripheral edema, no cyanosis, no clubbing   Vascular: Equal and normal pulses: 2+ peripheral pulses throughout  Neurological: sedated/intubated  Psychiatric: calm, normal mood, normal affect  Musculoskeletal: No joint swelling or deformity; no limitation of movement  Skin: warm, dry, well perfused, no rashes    HOSPITAL MEDICATIONS:  Standing Meds:  chlorhexidine 2% Cloths 1 Application(s) Topical <User Schedule>  dexMEDEtomidine Infusion 0.1 MICROgram(s)/kG/Hr IV Continuous <Continuous>  heparin   Injectable 5000 Unit(s) SubCutaneous every 12 hours  hydrocortisone sodium succinate Injectable 50 milliGRAM(s) IV Push every 6 hours  lactated ringers. 1000 milliLiter(s) IV Continuous <Continuous>  norepinephrine Infusion 0.7 MICROgram(s)/kG/Min IV Continuous <Continuous>  piperacillin/tazobactam IVPB.. 3.375 Gram(s) IV Intermittent every 8 hours  vasopressin Infusion 0.04 Unit(s)/Min IV Continuous <Continuous>      PRN Meds:      LABS:                        13.8   31.11 )-----------( 418      ( 16 Jun 2025 23:27 )             39.3     Hgb Trend: 13.8<--, 13.8<--  06-16    126[L]  |  97[L]  |  29[H]  ----------------------------<  101[H]  5.5[H]   |  14[L]  |  1.36[H]    Ca    7.9[L]      16 Jun 2025 23:27  Phos  4.5     06-16  Mg     1.90     06-16    TPro  4.4[L]  /  Alb  1.9[L]  /  TBili  0.3  /  DBili  x   /  AST  41[H]  /  ALT  15  /  AlkPhos  74  06-16    Creatinine Trend: 1.36<--, 1.18<--, 0.68<--  PT/INR - ( 16 Jun 2025 23:27 )   PT: 16.8 sec;   INR: 1.42 ratio         PTT - ( 16 Jun 2025 23:27 )  PTT:33.5 sec  Urinalysis Basic - ( 16 Jun 2025 23:27 )    Color: x / Appearance: x / SG: x / pH: x  Gluc: 101 mg/dL / Ketone: x  / Bili: x / Urobili: x   Blood: x / Protein: x / Nitrite: x   Leuk Esterase: x / RBC: x / WBC x   Sq Epi: x / Non Sq Epi: x / Bacteria: x      Arterial Blood Gas:  06-17 @ 05:40  7.28/34/115/16/99.3/-9.8  ABG lactate: --  Arterial Blood Gas:  06-16 @ 23:27  7.39/26/60/16/91.3/-7.5  ABG lactate: --    Venous Blood Gas:  06-16 @ 13:46  7.19/47/29/18/37.9  VBG Lactate: 8.8  Venous Blood Gas:  06-16 @ 12:16  7.16/32/46/11/71.5  VBG Lactate: 10.8      MICROBIOLOGY:     Culture - Blood (collected 16 Jun 2025 12:15)  Source: Blood Blood-Peripheral  Gram Stain (17 Jun 2025 04:54):    Growth in anaerobic bottle: Gram Positive Rods  Preliminary Report (17 Jun 2025 04:54):    Growth in anaerobic bottle: Gram Positive Rods    Direct identification is available within approximately 3-5    hours either by Blood Panel Multiplexed PCR or Direct    MALDI-TOF. Details: [email or web address]      RADIOLOGY:  [ ] Reviewed and interpreted by me             Interval Events: Admitted to MICU overnight for Sepsis requiring pressors. Hypotensive event, PIV found with extravasation, IO emergently placed, following a central line and [place] placement. Now with high dose Levo and Vasopressin    HPI:  74-year-old female past medical history of colon cancer stage IV brought in by EMS from home status post wellness check.  Per EMS daughter had not heard from her mother in 2 days and called for wellness check when EMS arrived she was found surrounded by feces on her bed.  Patient found to be hypotensive and appeared very drowsy.  And route patient received IV fluids about 200 cc.  At the time of my assessment patient is not endorsing any pain.  She does state she had a stomachache last night and multiple episodes of diarrhea.  She denies any falls or trauma to her head.  Patient appears very weak.  Rectal temp taken at bedside 92.4.  POCUS performed at bedside showed no obvious effusions.  IVC collapsible.  No B-lines noted on pulmonary exam.    Notable for temp of 92.4F, MAP 58 which improved to 80s-90s after fluids but dropped again. On 4L NC.   WBC of 13.46 with bands, Cr 1.18 (up from 0.68 on 6/4), Albumin 1.7 (down from 3.1 on 6/4), VBG with pH 7.19, pCO2 32, lactate 10.8->8.8,   In the ED received, 3L LR, 1g Vanc, 1g Cefepime,     CT a/p with interval increase in bilateral large pleural effusions, new diffuse mural thickening of the GI tract, suggestive of gastroenterocolitis, mild interval decrease of large ascites with trace pneumoperitoneum, representing changes from prior paracentesis. (16 Jun 2025 18:47)      REVIEW OF SYSTEMS:  Constitutional: [x] negative [ ] fevers [ ] chills [ ] weight loss [ ] weight gain  HEENT: [x] negative [ ] dry eyes [ ] eye irritation [ ] postnasal drip [ ] nasal congestion  CV: [x] negative  [ ] chest pain [ ] orthopnea [ ] palpitations [ ] murmur  Resp: [x] negative [ ] cough [ ] shortness of breath [ ] dyspnea [ ] wheezing [ ] sputum [ ] hemoptysis  GI: [ ] negative [ ] nausea [ ] vomiting [ ] diarrhea [ ] constipation [x] abd pain [ ] dysphagia   : [x] negative [ ] dysuria [ ] nocturia [ ] hematuria [ ] increased urinary frequency  Musculoskeletal: [x] negative [ ] back pain [ ] myalgias [ ] arthralgias [ ] fracture  Skin: [x] negative [ ] rash [ ] itch  Neurological: [ ] negative [ ] headache [ ] dizziness [ ] syncope [x] weakness [ ] numbness  Psychiatric: [x] negative [ ] anxiety [ ] depression  Endocrine: [x] negative [ ] diabetes [ ] thyroid problem  Hematologic/Lymphatic: [x] negative [ ] anemia [ ] bleeding problem  Allergic/Immunologic: [x] negative [ ] itchy eyes [ ] nasal discharge [ ] hives [ ] angioedema  [x] All other systems negative  [ ] Unable to assess ROS because ________    OBJECTIVE:  ICU Vital Signs Last 24 Hrs  T(C): 36.7 (17 Jun 2025 00:00), Max: 36.7 (17 Jun 2025 00:00)  T(F): 98.1 (17 Jun 2025 00:00), Max: 98.1 (17 Jun 2025 00:00)  HR: 102 (17 Jun 2025 05:00) (88 - 132)  BP: 142/127 (16 Jun 2025 21:00) (67/53 - 142/127)  BP(mean): 133 (16 Jun 2025 21:00) (58 - 133)  ABP: 116/68 (17 Jun 2025 05:00) (38/29 - 153/67)  ABP(mean): 86 (17 Jun 2025 05:00) (35 - 105)  RR: 32 (17 Jun 2025 05:00) (20 - 41)  SpO2: 99% (17 Jun 2025 05:00) (85% - 99%)    O2 Parameters below as of 17 Jun 2025 05:00  Patient On (Oxygen Delivery Method): nasal cannula w/ humidification    O2 Concentration (%): 6          06-16 @ 07:01  -  06-17 @ 06:35  --------------------------------------------------------  IN: 1705.9 mL / OUT: 0 mL / NET: 1705.9 mL      CAPILLARY BLOOD GLUCOSE      POCT Blood Glucose.: 153 mg/dL (17 Jun 2025 05:31)      Physical Exam:   Constitutional: ill appearing, frail and emaciated  HEENT: + PERRLA, EOMI, no drainage or redness  Neck: supple,  No JVD  Respiratory: Breath Sounds equal & clear bilaterally to auscultation, no accessory muscle use noted  Cardiovascular: Regular rate, regular rhythm, normal S1, S2; no murmurs or rub  Gastrointestinal: Soft, non-tender, non distended, no hepatosplenomegaly, normal bowel sounds  Extremities: + 2 peripheral edema, no cyanosis, no clubbing   Vascular: Equal and normal pulses: 2+ peripheral pulses throughout  Neurological: Lethargic, easily arousable, , following commands  Psychiatric: calm, normal mood, normal affect  Musculoskeletal: No joint swelling or deformity; no limitation of movement  Skin: warm, dry, well perfused, no rashes    HOSPITAL MEDICATIONS:  Standing Meds:  chlorhexidine 2% Cloths 1 Application(s) Topical <User Schedule>  dexMEDEtomidine Infusion 0.1 MICROgram(s)/kG/Hr IV Continuous <Continuous>  heparin   Injectable 5000 Unit(s) SubCutaneous every 12 hours  hydrocortisone sodium succinate Injectable 50 milliGRAM(s) IV Push every 6 hours  lactated ringers. 1000 milliLiter(s) IV Continuous <Continuous>  norepinephrine Infusion 0.7 MICROgram(s)/kG/Min IV Continuous <Continuous>  piperacillin/tazobactam IVPB.. 3.375 Gram(s) IV Intermittent every 8 hours  vasopressin Infusion 0.04 Unit(s)/Min IV Continuous <Continuous>      PRN Meds:      LABS:                        13.8   31.11 )-----------( 418      ( 16 Jun 2025 23:27 )             39.3     Hgb Trend: 13.8<--, 13.8<--  06-16    126[L]  |  97[L]  |  29[H]  ----------------------------<  101[H]  5.5[H]   |  14[L]  |  1.36[H]    Ca    7.9[L]      16 Jun 2025 23:27  Phos  4.5     06-16  Mg     1.90     06-16    TPro  4.4[L]  /  Alb  1.9[L]  /  TBili  0.3  /  DBili  x   /  AST  41[H]  /  ALT  15  /  AlkPhos  74  06-16    Creatinine Trend: 1.36<--, 1.18<--, 0.68<--  PT/INR - ( 16 Jun 2025 23:27 )   PT: 16.8 sec;   INR: 1.42 ratio         PTT - ( 16 Jun 2025 23:27 )  PTT:33.5 sec  Urinalysis Basic - ( 16 Jun 2025 23:27 )    Color: x / Appearance: x / SG: x / pH: x  Gluc: 101 mg/dL / Ketone: x  / Bili: x / Urobili: x   Blood: x / Protein: x / Nitrite: x   Leuk Esterase: x / RBC: x / WBC x   Sq Epi: x / Non Sq Epi: x / Bacteria: x      Arterial Blood Gas:  06-17 @ 05:40  7.28/34/115/16/99.3/-9.8  ABG lactate: --  Arterial Blood Gas:  06-16 @ 23:27  7.39/26/60/16/91.3/-7.5  ABG lactate: --    Venous Blood Gas:  06-16 @ 13:46  7.19/47/29/18/37.9  VBG Lactate: 8.8  Venous Blood Gas:  06-16 @ 12:16  7.16/32/46/11/71.5  VBG Lactate: 10.8      MICROBIOLOGY:     Culture - Blood (collected 16 Jun 2025 12:15)  Source: Blood Blood-Peripheral  Gram Stain (17 Jun 2025 04:54):    Growth in anaerobic bottle: Gram Positive Rods  Preliminary Report (17 Jun 2025 04:54):    Growth in anaerobic bottle: Gram Positive Rods    Direct identification is available within approximately 3-5    hours either by Blood Panel Multiplexed PCR or Direct    MALDI-TOF. Details: [email or web address]      RADIOLOGY:  [ ] Reviewed and interpreted by me             Interval Events: Admitted to MICU overnight for Sepsis requiring pressors. Hypotensive event, PIV found with extravasation, IO emergently placed, following a central line and [place] placement. Now with high dose Levo and Vasopressin    HPI:  74-year-old female past medical history of colon cancer stage IV brought in by EMS from home status post wellness check.  Per EMS daughter had not heard from her mother in 2 days and called for wellness check when EMS arrived she was found surrounded by feces on her bed.  Patient found to be hypotensive and appeared very drowsy.  And route patient received IV fluids about 200 cc.  At the time of my assessment patient is not endorsing any pain.  She does state she had a stomachache last night and multiple episodes of diarrhea.  She denies any falls or trauma to her head.  Patient appears very weak.  Rectal temp taken at bedside 92.4.  POCUS performed at bedside showed no obvious effusions.  IVC collapsible.  No B-lines noted on pulmonary exam.    Notable for temp of 92.4F, MAP 58 which improved to 80s-90s after fluids but dropped again. On 4L NC.   WBC of 13.46 with bands, Cr 1.18 (up from 0.68 on 6/4), Albumin 1.7 (down from 3.1 on 6/4), VBG with pH 7.19, pCO2 32, lactate 10.8->8.8,   In the ED received, 3L LR, 1g Vanc, 1g Cefepime,     CT a/p with interval increase in bilateral large pleural effusions, new diffuse mural thickening of the GI tract, suggestive of gastroenterocolitis, mild interval decrease of large ascites with trace pneumoperitoneum, representing changes from prior paracentesis. (16 Jun 2025 18:47)      REVIEW OF SYSTEMS:  Constitutional: [x] negative [ ] fevers [ ] chills [ ] weight loss [ ] weight gain  HEENT: [x] negative [ ] dry eyes [ ] eye irritation [ ] postnasal drip [ ] nasal congestion  CV: [x] negative  [ ] chest pain [ ] orthopnea [ ] palpitations [ ] murmur  Resp: [x] negative [ ] cough [ ] shortness of breath [ ] dyspnea [ ] wheezing [ ] sputum [ ] hemoptysis  GI: [ ] negative [ ] nausea [ ] vomiting [ ] diarrhea [ ] constipation [x] abd pain [ ] dysphagia   : [x] negative [ ] dysuria [ ] nocturia [ ] hematuria [ ] increased urinary frequency  Musculoskeletal: [x] negative [ ] back pain [ ] myalgias [ ] arthralgias [ ] fracture  Skin: [x] negative [ ] rash [ ] itch  Neurological: [ ] negative [ ] headache [ ] dizziness [ ] syncope [x] weakness [ ] numbness  Psychiatric: [x] negative [ ] anxiety [ ] depression  Endocrine: [x] negative [ ] diabetes [ ] thyroid problem  Hematologic/Lymphatic: [x] negative [ ] anemia [ ] bleeding problem  Allergic/Immunologic: [x] negative [ ] itchy eyes [ ] nasal discharge [ ] hives [ ] angioedema  [x] All other systems negative  [ ] Unable to assess ROS because ________    OBJECTIVE:  ICU Vital Signs Last 24 Hrs  T(C): 36.7 (17 Jun 2025 00:00), Max: 36.7 (17 Jun 2025 00:00)  T(F): 98.1 (17 Jun 2025 00:00), Max: 98.1 (17 Jun 2025 00:00)  HR: 102 (17 Jun 2025 05:00) (88 - 132)  BP: 142/127 (16 Jun 2025 21:00) (67/53 - 142/127)  BP(mean): 133 (16 Jun 2025 21:00) (58 - 133)  ABP: 116/68 (17 Jun 2025 05:00) (38/29 - 153/67)  ABP(mean): 86 (17 Jun 2025 05:00) (35 - 105)  RR: 32 (17 Jun 2025 05:00) (20 - 41)  SpO2: 99% (17 Jun 2025 05:00) (85% - 99%)    O2 Parameters below as of 17 Jun 2025 05:00  Patient On (Oxygen Delivery Method): nasal cannula w/ humidification    O2 Concentration (%): 6          06-16 @ 07:01  -  06-17 @ 06:35  --------------------------------------------------------  IN: 1705.9 mL / OUT: 0 mL / NET: 1705.9 mL      CAPILLARY BLOOD GLUCOSE      POCT Blood Glucose.: 153 mg/dL (17 Jun 2025 05:31)      Physical Exam:   Constitutional: ill appearing, frail and emaciated  HEENT: + PERRLA, EOMI, no drainage or redness  Neck: supple,  No JVD, RIJ central line  Respiratory: Breath Sounds equal & clear bilaterally to auscultation, no accessory muscle use noted  Cardiovascular: Regular rate, regular rhythm, normal S1, S2; no murmurs or rub  Gastrointestinal: Firm, diffused tenderness,  distended   Extremities: + 2 peripheral edema, no cyanosis, no clubbing   Vascular: Equal and normal pulses: 2+ peripheral pulses throughout  Neurological: Lethargic, easily arousable, , following commands  Psychiatric: calm, normal mood, normal affect  Musculoskeletal: No joint swelling or deformity; no limitation of movement  Skin: warm, dry, well perfused, no rashes    HOSPITAL MEDICATIONS:  Standing Meds:  chlorhexidine 2% Cloths 1 Application(s) Topical <User Schedule>  dexMEDEtomidine Infusion 0.1 MICROgram(s)/kG/Hr IV Continuous <Continuous>  heparin   Injectable 5000 Unit(s) SubCutaneous every 12 hours  hydrocortisone sodium succinate Injectable 50 milliGRAM(s) IV Push every 6 hours  lactated ringers. 1000 milliLiter(s) IV Continuous <Continuous>  norepinephrine Infusion 0.7 MICROgram(s)/kG/Min IV Continuous <Continuous>  piperacillin/tazobactam IVPB.. 3.375 Gram(s) IV Intermittent every 8 hours  vasopressin Infusion 0.04 Unit(s)/Min IV Continuous <Continuous>      PRN Meds:      LABS:                        13.8   31.11 )-----------( 418      ( 16 Jun 2025 23:27 )             39.3     Hgb Trend: 13.8<--, 13.8<--  06-16    126[L]  |  97[L]  |  29[H]  ----------------------------<  101[H]  5.5[H]   |  14[L]  |  1.36[H]    Ca    7.9[L]      16 Jun 2025 23:27  Phos  4.5     06-16  Mg     1.90     06-16    TPro  4.4[L]  /  Alb  1.9[L]  /  TBili  0.3  /  DBili  x   /  AST  41[H]  /  ALT  15  /  AlkPhos  74  06-16    Creatinine Trend: 1.36<--, 1.18<--, 0.68<--  PT/INR - ( 16 Jun 2025 23:27 )   PT: 16.8 sec;   INR: 1.42 ratio         PTT - ( 16 Jun 2025 23:27 )  PTT:33.5 sec  Urinalysis Basic - ( 16 Jun 2025 23:27 )    Color: x / Appearance: x / SG: x / pH: x  Gluc: 101 mg/dL / Ketone: x  / Bili: x / Urobili: x   Blood: x / Protein: x / Nitrite: x   Leuk Esterase: x / RBC: x / WBC x   Sq Epi: x / Non Sq Epi: x / Bacteria: x      Arterial Blood Gas:  06-17 @ 05:40  7.28/34/115/16/99.3/-9.8  ABG lactate: --  Arterial Blood Gas:  06-16 @ 23:27  7.39/26/60/16/91.3/-7.5  ABG lactate: --    Venous Blood Gas:  06-16 @ 13:46  7.19/47/29/18/37.9  VBG Lactate: 8.8  Venous Blood Gas:  06-16 @ 12:16  7.16/32/46/11/71.5  VBG Lactate: 10.8      MICROBIOLOGY:     Culture - Blood (collected 16 Jun 2025 12:15)  Source: Blood Blood-Peripheral  Gram Stain (17 Jun 2025 04:54):    Growth in anaerobic bottle: Gram Positive Rods  Preliminary Report (17 Jun 2025 04:54):    Growth in anaerobic bottle: Gram Positive Rods    Direct identification is available within approximately 3-5    hours either by Blood Panel Multiplexed PCR or Direct    MALDI-TOF. Details: [email or web address]      RADIOLOGY:  [ ] Reviewed and interpreted by me

## 2025-06-17 NOTE — CONSULT NOTE ADULT - SUBJECTIVE AND OBJECTIVE BOX
Oncology Consult Note    HPI as per admitting team:   74-year-old female past medical history of colon cancer stage IV brought in by EMS from home status post wellness check.  Per EMS daughter had not heard from her mother in 2 days and called for wellness check when EMS arrived she was found surrounded by feces on her bed.  Patient found to be hypotensive and appeared very drowsy.  And route patient received IV fluids about 200 cc.  At the time of my assessment patient is not endorsing any pain.  She does state she had a stomachache last night and multiple episodes of diarrhea.  She denies any falls or trauma to her head.  Patient appears very weak.  Rectal temp taken at bedside 92.4.  POCUS performed at bedside showed no obvious effusions.  IVC collapsible.  No B-lines noted on pulmonary exam.    Notable for temp of 92.4F, MAP 58 which improved to 80s-90s after fluids but dropped again. On 4L NC.   WBC of 13.46 with bands, Cr 1.18 (up from 0.68 on 6/4), Albumin 1.7 (down from 3.1 on 6/4), VBG with pH 7.19, pCO2 32, lactate 10.8->8.8,   In the ED received, 3L LR, 1g Vanc, 1g Cefepime,     CT a/p with interval increase in bilateral large pleural effusions, new diffuse mural thickening of the GI tract, suggestive of gastroenterocolitis, mild interval decrease of large ascites with trace pneumoperitoneum, representing changes from prior paracentesis. (16 Jun 2025 18:47)        REVIEW OF SYSTEMS:    CONSTITUTIONAL: No weakness, fevers or chills  EYES/ENT: No visual changes;  No vertigo or throat pain   NECK: No pain or stiffness  RESPIRATORY: No cough, wheezing, hemoptysis; No shortness of breath  CARDIOVASCULAR: No chest pain or palpitations  GASTROINTESTINAL: No abdominal or epigastric pain. No nausea, vomiting, or hematemesis; No diarrhea or constipation. No melena or hematochezia.  GENITOURINARY: No dysuria, frequency or hematuria  NEUROLOGICAL: No numbness or weakness  SKIN: No itching, burning, rashes, or lesions   All other review of systems is negative unless indicated above.    PAST MEDICAL & SURGICAL HISTORY:  Hypertension      No significant past surgical history          FAMILY HISTORY:      SOCIAL HISTORY:     Allergies    No Known Allergies    Intolerances        MEDICATIONS  (STANDING):  chlorhexidine 2% Cloths 1 Application(s) Topical <User Schedule>  dexMEDEtomidine Infusion 0.1 MICROgram(s)/kG/Hr (1.14 mL/Hr) IV Continuous <Continuous>  heparin   Injectable 5000 Unit(s) SubCutaneous every 12 hours  hydrocortisone sodium succinate Injectable 50 milliGRAM(s) IV Push every 6 hours  lactated ringers Bolus 500 milliLiter(s) IV Bolus once  lactated ringers. 1000 milliLiter(s) (75 mL/Hr) IV Continuous <Continuous>  norepinephrine Infusion 0.7 MICROgram(s)/kG/Min (29.8 mL/Hr) IV Continuous <Continuous>  piperacillin/tazobactam IVPB.. 3.375 Gram(s) IV Intermittent every 8 hours  sodium bicarbonate  Infusion 0.51 mEq/kG/Hr (150 mL/Hr) IV Continuous <Continuous>  vasopressin Infusion 0.04 Unit(s)/Min (6 mL/Hr) IV Continuous <Continuous>    MEDICATIONS  (PRN):      OBJECTIVE   Height (cm): 170.2 (06-16 @ 13:12)  Weight (kg): 44.1 (06-16 @ 20:05)  BMI (kg/m2): 15.2 (06-16 @ 20:05)  BSA (m2): 1.49 (06-16 @ 20:05)    T(F): 97.5 (06-17-25 @ 08:00), Max: 98.1 (06-17-25 @ 00:00)  HR: 85 (06-17-25 @ 10:00)  BP: 142/127 (06-16-25 @ 21:00)  RR: 26 (06-17-25 @ 10:00)  SpO2: 93% (06-17-25 @ 10:00)  Wt(kg): --    PHYSICAL EXAM   GENERAL: NAD, well-developed  HEAD:  Atraumatic, Normocephalic  EYES: EOMI, PERRLA, conjunctiva and sclera clear  NECK: Supple, No JVD  CHEST/LUNG: Clear to auscultation bilaterally; No wheeze  HEART: Regular rate and rhythm; No murmurs, rubs, or gallops  ABDOMEN: Soft, Nontender, Nondistended; Bowel sounds present  EXTREMITIES:  2+ Peripheral Pulses, No clubbing, cyanosis, or edema  NEUROLOGY: non-focal  SKIN: No rashes or lesions                          13.8   31.11 )-----------( 418      ( 16 Jun 2025 23:27 )             39.3       06-17    125[L]  |  95[L]  |  33[H]  ----------------------------<  165[H]  5.2   |  15[L]  |  1.48[H]    Ca    7.3[L]      17 Jun 2025 05:40  Phos  4.8     06-17  Mg     1.80     06-17    TPro  4.1[L]  /  Alb  1.7[L]  /  TBili  0.3  /  DBili  x   /  AST  38[H]  /  ALT  13  /  AlkPhos  74  06-17      Magnesium: 1.80 mg/dL (06-17 @ 05:40)  Phosphorus: 4.8 mg/dL (06-17 @ 05:40)  Magnesium: 1.90 mg/dL (06-16 @ 23:27)  Phosphorus: 4.5 mg/dL (06-16 @ 23:27)  Magnesium: 1.90 mg/dL (06-16 @ 13:46)  Phosphorus: 4.4 mg/dL (06-16 @ 13:46)

## 2025-06-17 NOTE — PROGRESS NOTE ADULT - CRITICAL CARE ATTENDING COMMENT
Patient is a 75 yo F w/ Stage IV Colon Ca and HTN who is admitted to MICU for septic shock in setting of Strep bacteremia in setting of gastroenterocolitis.    #Septic Shock  #Strep Bacteremia  #Gastroenterocolitis  #IVORY  - c/w vasopressors to maintain MAP > 65, wean as tolerated  - c/w stress dose steroids for now with Hydrocort 50 q6h for now  - c/w empiric Zosyn, f/u cultures  - ID consult  - Will change maintenance IVF to Bicarb gtt and given additional 500 cc LR as still intravascularly depleted  - Monitor UOP and BMP, place brandt, Renal consult  - Check Free T4, TSH elevated to 7.3  - c/w Precedex for agitation  - Bedside swallow, may need NGT  - Ascites appears simple, may do diagnostic para    #POCUS - Mild decreased LV systolic function, LVOT VTI 11.1 cm. Vasoplegic shock    #DVT ppx - HSQ    #GOC - Full Code    Morris Neil MD  Pulmonary & Critical Care

## 2025-06-17 NOTE — DIETITIAN INITIAL EVALUATION ADULT - PERTINENT LABORATORY DATA
06-17    125[L]  |  95[L]  |  33[H]  ----------------------------<  165[H]  5.2   |  15[L]  |  1.48[H]    Ca    7.3[L]      17 Jun 2025 05:40  Phos  4.8     06-17  Mg     1.80     06-17    CAPILLARY BLOOD GLUCOSE    POCT Blood Glucose.: 129 mg/dL (17 Jun 2025 08:00)  POCT Blood Glucose.: 153 mg/dL (17 Jun 2025 05:31)  POCT Blood Glucose.: 168 mg/dL (17 Jun 2025 03:33)  POCT Blood Glucose.: 232 mg/dL (17 Jun 2025 02:16)  POCT Blood Glucose.: 70 mg/dL (17 Jun 2025 01:15)  POCT Blood Glucose.: 61 mg/dL (17 Jun 2025 01:09)

## 2025-06-17 NOTE — SWALLOW BEDSIDE ASSESSMENT ADULT - COMMENTS
Progress Note-MICU 6/17: "GREG PELAYO is a 74y female with PMH colon cancer stage IV and HTN who was admitted for sepsis and hypotension on norepinephrine"    Dietitian Willie 6/17: "Spoke with RN.  Pt. confused/disoriented, hence NPO, pending dysphagia screen by nursing. Met with Pt. and son and daughter @ bedside.  Pt. denies any nausea @ this time. Informed Pt. and family of current NPO status.  Pt. endorses feeling "a little hungry" and is also asking for water. When asked, no reported Hx of chewing/swallowing issues PTA.  RDN explained purpose of dysphagia screen by nursing.  Daughter & son with questions regarding this information.  Discussed that if there is concern with Pt.'s swallowing function, it may warrant a swallow evaluation by SLP to help determine appropriateness of PO diet."    CXR 6/16: "IMPRESSION: Status post central line placement. Bilateral large effusions with underlying atelectasis." Progress Note-MICU 6/17: "GREG PELAYO is a 74y female with PMH colon cancer stage IV and HTN who was admitted for sepsis and hypotension on norepinephrine"    Dietitian Willie 6/17: "Spoke with RN.  Pt. confused/disoriented, hence NPO, pending dysphagia screen by nursing. Met with Pt. and son and daughter @ bedside.  Pt. denies any nausea @ this time. Informed Pt. and family of current NPO status.  Pt. endorses feeling "a little hungry" and is also asking for water. When asked, no reported Hx of chewing/swallowing issues PTA.  RDN explained purpose of dysphagia screen by nursing.  Daughter & son with questions regarding this information.  Discussed that if there is concern with Pt.'s swallowing function, it may warrant a swallow evaluation by SLP to help determine appropriateness of PO diet."    CXR 6/16: "IMPRESSION: Status post central line placement. Bilateral large effusions with underlying atelectasis."    Patient received awake/alert, reclined in bed, and receiving supplemental O2 via nasal cannula. Confused; Oriented to self, only. Reoriented to place, time, and situation. Patient repositioned to upright; Able to make basic wants & needs known and follow one-step verbal directives. RNTate, called patient's family members, who arrived at bedside for Clinical Swallow Evaluation. Per patient's son and daughter-in-law, patient was consuming, "soft" solids with thin liquids prior to hospitalization. Patient agreeable to PO trials with SLP for purpose of Clinical Swallow Evaluation.

## 2025-06-17 NOTE — DIETITIAN INITIAL EVALUATION ADULT - NS FNS REASON FOR WEIGHT CHANG
metastatic stage IV colon Ca;    *Of note...  ascites may be masking further weight decrease./altered GI function (specify)/other (specify)

## 2025-06-17 NOTE — CONSULT NOTE ADULT - ASSESSMENT
Ms. Kilpatrick is a 74 year old F with history of metastatic adenocarcinoma (CDX2. unknown primary) who recently started dose reduced single agent cabecitapine 1-2 weeks ago who presents with hypotension     Pathology: CDX2+, MMR pending  NGS: Guardant 360 sent 6/4/25. Foundation requested  Sites: Peritoneal (Has not had colonoscopy or PET).      Pathology: Adenocarcinoma    TNM stage: T, M1    -Discussed with brittany that her disease is aggressive and widespread for which she was trialed on lowest dose of cabecitabine.   -Recommend to hold chemotherapy now and upon discharge  -Infectious workup per MICU and medicine teams.   -Patient to follow up with Dr. Daly to further discuss plan of care. Please contact oncology upon discharge.   -Oncology to sign off. Please call with any questions or concerns.       Discussed and seen with Dr. Mendez.     Divya Gonzalez MD  Hematology Oncology Fellow, PGY-4 Ms. Kilpatrick is a 74 year old F with history of metastatic adenocarcinoma (CDX2. unknown primary) who recently started dose reduced single agent cabecitapine 1-2 weeks ago who presents with hypotension     Pathology: CDX2+, MMR pending  NGS: Guardant 360 sent 6/4/25. Foundation requested  Sites: Peritoneal (Has not had colonoscopy or PET).      Pathology: Adenocarcinoma    TNM stage: T, M1    CT abdomen and pelvis: interval increase in bilateral large pleural effusions. Peritoneal and serosal implants demonstrate mild interval increase in size, however there is significant decrease in attenuation. Findings   suggestive of posttreatment changes. New diffuse mural thickening of the GI tract, suggestive of gastroenterocolitis. Mild interval decrease of large ascites with trace pneumoperitoneum, representing changes from prior paracentesis.  ,  -Discussed with brittany that her disease is aggressive and widespread for which she was trialed on lowest dose of cabecitabine.   -Recommend to hold chemotherapy now and upon discharge  -Infectious workup per MICU and medicine teams- to evaluate for peritonitis, gastrocolitis vs SBP.    -Patient to follow up with Dr. Daly to further discuss plan of care. Please contact oncology upon discharge.   -Oncology to sign off. Please call with any questions or concerns.       Discussed and seen with Dr. Mendez.     Divya Gonzalez MD  Hematology Oncology Fellow, PGY-4 Ms. Kilpatrick is a 74 year old F with history of metastatic adenocarcinoma (CDX2. unknown primary) who recently started dose reduced single agent cabecitapine 1-2 weeks ago who presents with hypotension     Pathology: CDX2+, MMR pending  NGS: Guardant 360 sent 6/4/25. Foundation requested  Sites: Peritoneal (Has not had colonoscopy or PET).      Pathology: Adenocarcinoma    TNM stage: T, M1    CT abdomen and pelvis: interval increase in bilateral large pleural effusions. Peritoneal and serosal implants demonstrate mild interval increase in size, however there is significant decrease in attenuation. Findings   suggestive of posttreatment changes. New diffuse mural thickening of the GI tract, suggestive of gastroenterocolitis. Mild interval decrease of large ascites with trace pneumoperitoneum, representing changes from prior paracentesis.  ,    -Recommend to hold chemotherapy now and upon discharge  -Infectious workup per MICU and medicine teams- to evaluate for peritonitis, gastrocolitis vs SBP.    -Patient to follow up with Dr. Daly to further discuss plan of care. Please contact oncology upon discharge.   -Oncology to sign off. Please call with any questions or concerns.       Discussed and seen with Dr. Mendez.     Divya Gonzalez MD  Hematology Oncology Fellow, PGY-4 Ms. Kilpatrick is a 74 year old F with history of metastatic adenocarcinoma (CDX2. unknown primary) who recently started dose reduced single agent cabecitapine 1-2 weeks ago who presents with hypotension, diarrhea and sepsis.     Pathology: CDX2+, MMR pending  NGS: Guardant 360 sent 6/4/25. Foundation requested  Sites: Peritoneal (Has not had colonoscopy or PET).      Pathology: Adenocarcinoma    TNM stage: T, M1    CT abdomen and pelvis: interval increase in bilateral large pleural effusions. Peritoneal and serosal implants demonstrate mild interval increase in size, however there is significant decrease in attenuation. Findings   suggestive of posttreatment changes. New diffuse mural thickening of the GI tract, suggestive of gastroenterocolitis. Mild interval decrease of large ascites with trace pneumoperitoneum, representing changes from prior paracentesis.  DPD negative.Cr 0.7 prior to start treatment.   Diarrhea likely secondary to capecitabine     -Recommend to hold chemotherapy now and upon discharge  -renal for IVORY  -Infectious workup per MICU and medicine teams- to evaluate for peritonitis, gastrocolitis vs SBP.    -Patient to follow up with Dr. Daly to further discuss plan of care. Please contact oncology upon discharge.   -Oncology to sign off. Please call with any questions or concerns.       Discussed and seen with Dr. Mendez.     Divya Gonzalez MD  Hematology Oncology Fellow, PGY-4 Ms. Kilpatrick is a 74 year old F with history of metastatic adenocarcinoma (CDX2. unknown primary) who recently started dose reduced single agent cabecitapine 1-2 weeks ago who presents with hypotension, diarrhea and sepsis.     Pathology: CDX2+, MMR pending  NGS: Guardant 360 sent 6/4/25. Foundation requested  Sites: Peritoneal (Has not had colonoscopy or PET).      Pathology: Adenocarcinoma    TNM stage: T, M1    CT abdomen and pelvis: interval increase in bilateral large pleural effusions. Peritoneal and serosal implants demonstrate mild interval increase in size, however there is significant decrease in attenuation. Findings   suggestive of posttreatment changes. New diffuse mural thickening of the GI tract, suggestive of gastroenterocolitis. Mild interval decrease of large ascites with trace pneumoperitoneum, representing changes from prior paracentesis.  DPD negative. Cr 0.7 prior to start treatment-> Cr 1.62  Diarrhea likely secondary to colitis vs related to capecitabine     -Recommend to hold chemotherapy now and upon discharge  -renal for IVORY  -For encephalopathy-likely secondary to infection, consider workup ammonia to determine need for lactulose. If does not improve, consider CTH/neurology consult.    -Infectious workup per MICU and medicine teams- to evaluate for peritonitis, gastrocolitis vs SBP.  Given strep bacteremia (1/2) ID recommends diag para to r/o SBP. Currently patient is on Zosyn. Blood cultures and infectious workup per ID.   -Ok with NGT to provide nutrition. Recommend to avoid any permanent placement- either feeding tube.   -Patient to follow up with Dr. Daly to further discuss plan of care. Please contact oncology upon discharge.   -Oncology to sign off. Please call with any questions or concerns.       Discussed and seen with Dr. Mendez.     Divya Gonzalez MD  Hematology Oncology Fellow, PGY-4 Ms. Kilpatrick is a 74 year old F with history of metastatic adenocarcinoma (CDX2. unknown primary) who recently started dose reduced single agent cabecitapine 1-2 weeks ago who presents with hypotension, diarrhea and sepsis.     Pathology: CDX2+, MMR pending  NGS: Guardant 360 sent 6/4/25. Foundation requested  Sites: Peritoneal (Has not had colonoscopy or PET).      Pathology: Adenocarcinoma    TNM stage: T, M1    CT abdomen and pelvis: interval increase in bilateral large pleural effusions. Peritoneal and serosal implants demonstrate mild interval increase in size, however there is significant decrease in attenuation. Findings   suggestive of posttreatment changes. New diffuse mural thickening of the GI tract, suggestive of gastroenterocolitis. Mild interval decrease of large ascites with trace pneumoperitoneum, representing changes from prior paracentesis.  DPD negative. Cr 0.7 prior to start treatment-> Cr 1.62  Diarrhea likely secondary to colitis vs related to capecitabine     -Recommend to hold chemotherapy now and upon discharge  -For encephalopathy-likely secondary to infection, consider workup ammonia to determine need for lactulose. If does not improve, consider CTH/neurology consult.    -Infectious workup per MICU and medicine teams- to evaluate for peritonitis, gastrocolitis vs SBP.  Given strep bacteremia (1/2) ID recommends diag para to r/o SBP. Currently patient is on Zosyn. Blood cultures and infectious workup per ID.   -Ok with NGT to provide nutrition. Recommend to avoid any permanent placement- either feeding tube.   -Patient to follow up with Dr. Daly to further discuss plan of care. Please contact oncology upon discharge.   -Oncology to sign off. Please call with any questions or concerns.       Discussed and seen with Dr. Mendez.     Divya Gonzalez MD  Hematology Oncology Fellow, PGY-4

## 2025-06-17 NOTE — SWALLOW BEDSIDE ASSESSMENT ADULT - ORAL PREPARATORY PHASE
Reduced oral stripping of bolus presentation from teaspoon/utensil; Slow bolus manipulation. Reduced oral stripping of bolus presentations from teaspoon/utensil.

## 2025-06-17 NOTE — CONSULT NOTE ADULT - PROBLEM SELECTOR RECOMMENDATION 9
Pt. with IVORY in the setting of hemodynamic instability, IV contrast use, and decreased oral intake. [facility], Scr 0.68 on 6/4/25. Last UA showed turbid urine with proteinuria and leuk esterase (5/9/25). CT A/P with IV contrast showed kidneys/ureters within normal limits (6/16/25).    Scr with further increase to 1.48 today. Please order UA, urine lytes, renal US, and UPCR. Recommend PVR bladder scan and brandt cath if PVR>200mL. Monitor labs and urine output. Currently, no plan for RRT. Avoid any potential nephrotoxins when possible and dose medications per eGFR.

## 2025-06-17 NOTE — SWALLOW BEDSIDE ASSESSMENT ADULT - SWALLOW EVAL: RECOMMENDED DIET
1. Continue NPO with consideration for short-term, non-oral means of nutrition/hydration/medication administration.

## 2025-06-17 NOTE — PROGRESS NOTE ADULT - ASSESSMENT
GREG PELAYO is a 74y female with PMH colon cancer stage IV and HTN who was admitted for sepsis and hypotension on norepinephrine    PLAN  =====Neurologic=====  Patient is AOx4    =====Pulmonary=====  Patient breathing comfortably on room air    =====Cardiovascular=====  #Hypotension  #Sepsis  - On norepinephrine     #Hypothermia  - monitor temperature  - warming blanket    #HTN  - hold home med telmisartan-hydrochlorothiazide 80 mg-12.5 mg qd iso septic shock  - pending TTE     =====GI=====  #Abdominal Pain  #Diarrhea  #Ascites  Concern for gastroenterisis vs peritonitis. CT showing changes consistent with prior paracentesis when compared to 6/4/25 scans.  - s/p vanc and cefepime x1 in ED  - zosyn (6/16-  - f/u GI PCR  - f/u Stool culture  - Diagnostic paracentesis for concern for SBP. Patient had a paracentesis on 6/11 but patient still complaining of tender abdomen,  --> US paracentesis note from 6/11 with 6550L removed.   --> But no lab records in system/HIE showing results of testing on para-fluid    #Diet  - Full diet    =====Renal/=====  #IVORY  - Likely prerenal   - Cr 1.18 (up from 0.68 on 6/4)  - Monitor Cr    #Electrolytes  - Maintain K>4, Phos>3, Mag>2, iCal>1    =====Endocrine=====  NA    =====Infectious Disease=====  #Septic shock  #Hypotensive and hypothermic  - f/u BCx  - f/u UCx  - full RVP  - s/p vanc and cefepime x1 in ED  - Zosyn (6/16-    =====Heme/Onc=====  #DVT Ppx  - Heparin SQ q12h    =====Ethics=====  FULL CODE Pt son requesting Provider to explain TTE results GREG PELAYO is a 74y female with PMH colon cancer stage IV and HTN who was admitted for sepsis and hypotension on norepinephrine    PLAN  =====Neurologic=====  Baseline AOx4  -Currently weak, with episodes of restlessness, oriented x4  -C/w precedex gtt for c/o anxiety    =====Pulmonary=====  #Pleural Effusions  -CXR 6/16 shows bilateral large effusions with underlying atelectasis  -02 sat >95% on 6L NC    =====Cardiovascular=====  #Hypotension  #Shock  - likely Septic shock  - requiring pressors, c/w levo and vasopressin and titrate for map >65   - continue to hold home med telmisartan-hydrochlorothiazide 80 mg-12.5 mg qd iso septic shock  - TTE 6/17 Paradoxical septal wall motion. LV systolic function mildly decreased, EF 45 to 50%, global LV hypokinesis. mild (grade 1) left ventricular diastolic dysfunction. RV not well visualized. Mild MR and moderate TR. PASP is 45 mmHg, consistent with mild pHTN. Bilateral pleural effusion noted.      =====GI=====  #Abdominal Pain  #Diarrhea  #Ascites  Concern for gastroenteritis vs peritonitis. CT showing changes consistent with prior paracentesis when compared to 6/4/25 scans.  - S/p vanc and cefepime x1 in ED  - C/w empiric Zosyn (6/16-  )  - Send GI PCR,  Stool culture with next BM  - S/p US paracentesis on 6/11 (6550L removed) but patient still complaining of tender abdomen. Plan for diagnostic paracentesis, however family deferred at this time  --> no lab records in system/HIE showing results of testing on para-fluid    #Diet  - Keep NPO for now  - Failed dysphagia screen  - SLP eval - recommends to keep NPO and TF to maintain provide adequate nutrition. (pt noted to be lethargic during exam, delayed swallowing)   - Family deferred NGT placement at this time, reports discussing  NGT with oncologist and based on their conversation would like to hold off for now  - Patient will require adequate nutrition/hydration, plan to reassess and repeat SLP as mental status improves,  - Nutrition consult        =====Renal/=====  #Hyponatremia  #IVORY  Baseline SCr 0.68 (6/4/25)  - SCr uptrending -> 1.6, likely prerenal 2/2 hemodynamic instability, IV contrast use, and decreased oral intake  - CT A/P with IV contrast 6/16 showed kidneys/ureters within normal limits    - CT A/P with IV contrast showed kidneys/ureters within normal limits (6/16/25).  - Nephrology following  - F/u Renal Ultrasound  - hyponatremia likely iso of malignancy, decreased PO intake and ADH stimulation due to pain.   - Send urine lytes, serum osms, and urine osms.   - Recommend fluid restriction. Continue with furosemide. Optimize glucose control. Monitor SNa.    #Electrolytes  - Maintain K>4, Phos>3, Mag>2, iCal>1    =====Endocrine=====  NA    =====Infectious Disease=====  #Septic shock  #Hypotensive and hypothermic  - f/u BCx  - f/u UCx  - full RVP  - s/p vanc and cefepime x1 in ED  - Zosyn (6/16-    =====Heme/Onc=====  #DVT Ppx  - Heparin SQ q12h    =====Ethics=====  FULL CODE GREG PELAYO is a 74y female with PMH colon cancer stage IV and HTN who was admitted for sepsis and hypotension on norepinephrine    PLAN  =====Neurologic=====  Baseline AOx4  -Currently weak, with episodes of restlessness, oriented x4  -C/w precedex gtt for c/o anxiety    =====Pulmonary=====  #Pleural Effusions  -CXR 6/16 shows bilateral large effusions with underlying atelectasis  -02 sat >95% on 6L NC    =====Cardiovascular=====  #Hypotension  #Shock  - likely Septic shock  - requiring pressors, c/w levo and vasopressin and titrate for map >65   - continue to hold home med telmisartan-hydrochlorothiazide 80 mg-12.5 mg qd iso septic shock  - TTE 6/17 Paradoxical septal wall motion. LV systolic function mildly decreased, EF 45 to 50%, global LV hypokinesis. mild (grade 1) left ventricular diastolic dysfunction. RV not well visualized. Mild MR and moderate TR. PASP is 45 mmHg, consistent with mild pHTN. Bilateral pleural effusion noted.      =====GI=====  #Abdominal Pain  #Diarrhea  #Ascites  Concern for gastroenteritis vs peritonitis. CT showing changes consistent with prior paracentesis when compared to 6/4/25 scans.  - S/p vanc and cefepime x1 in ED  - C/w empiric Zosyn (6/16-  )  - Send GI PCR,  Stool culture with next BM  - S/p US paracentesis on 6/11 (6550L removed) but patient still complaining of tender abdomen. Plan for diagnostic paracentesis, however family deferred at this time  --> no lab records in system/HIE showing results of testing on para-fluid    #Diet  - Keep NPO for now  - Failed dysphagia screen  - SLP eval - recommends to keep NPO and TF to maintain provide adequate nutrition. (pt noted to be lethargic during exam, delayed swallowing)   - Family deferred NGT placement at this time, reports discussing  NGT with oncologist and based on their conversation would like to hold off for now  - Patient will require adequate nutrition/hydration, plan to reassess and repeat SLP as mental status improves,  - Nutrition consult        =====Renal/=====  #Hyponatremia  #IVORY  Baseline SCr 0.68 (6/4/25)  - SCr uptrending -> 1.6, likely prerenal 2/2 hemodynamic instability, IV contrast use, and decreased oral intake  - CT A/P with IV contrast 6/16 showed kidneys/ureters within normal limits  - F/u Renal Ultrasound  - Nephrology consulted, now following  - Indwelling catheter placed, monitor I/O's  - S/p 4.5L IVF, POCUS showed IVC 1.3   - hyponatremia likely iso of malignancy, decreased PO intake and ADH stimulation due to pain.   - Send urine lytes, serum osms, and urine osms.   - Recommend fluid restriction. Continue with furosemide. Optimize glucose control. Monitor SNa.    #Electrolytes  - Maintain K>4, Phos>3, Mag>2, iCal>1    =====Endocrine=====  NA    =====Infectious Disease=====  #Septic shock  #Hypotensive and hypothermic  - f/u BCx  - f/u UCx  - full RVP  - s/p vanc and cefepime x1 in ED  - Zosyn (6/16-    =====Heme/Onc=====  #DVT Ppx  - Heparin SQ q12h    =====Ethics=====  FULL CODE GREG PELAYO is a 74y female with PMH colon cancer stage IV and HTN who was admitted for sepsis and hypotension on norepinephrine    PLAN  =====Neurologic=====  Baseline AOx4  -Currently weak, with episodes of restlessness, oriented x4  -C/w precedex gtt for c/o anxiety    =====Pulmonary=====  #Pleural Effusions  -CXR 6/16 shows bilateral large effusions with underlying atelectasis  -02 sat >95% on 6L NC    =====Cardiovascular=====  #Hypotension  #Shock  - likely Septic shock  - requiring pressors, c/w levo and vasopressin and titrate for map >65   - continue to hold home med telmisartan-hydrochlorothiazide 80 mg-12.5 mg qd iso septic shock  - TTE 6/17 Paradoxical septal wall motion. LV systolic function mildly decreased, EF 45 to 50%, global LV hypokinesis. mild (grade 1) left ventricular diastolic dysfunction. RV not well visualized. Mild MR and moderate TR. PASP is 45 mmHg, consistent with mild pHTN. Bilateral pleural effusion noted.      =====GI=====  #Abdominal Pain  #Diarrhea  #Ascites  Concern for gastroenteritis vs peritonitis. CT showing changes consistent with prior paracentesis when compared to 6/4/25 scans.  - S/p vanc and cefepime x1 in ED  - C/w empiric Zosyn (6/16-  )  - Send GI PCR,  Stool culture with next BM  - S/p US paracentesis on 6/11 (6550L removed) but patient still complaining of tender abdomen. Plan for diagnostic paracentesis, however family deferred at this time  --> no lab records in system/HIE showing results of testing on para-fluid    #Diet  - Keep NPO for now  - Failed dysphagia screen  - SLP eval - recommends to keep NPO and TF to maintain provide adequate nutrition. (pt noted to be lethargic during exam, delayed swallowing)   - Family deferred NGT placement at this time, reports discussing  NGT with oncologist and based on their conversation would like to hold off for now  - Patient will require adequate nutrition/hydration, plan to reassess and repeat SLP as mental status improves,  - Nutrition consult        =====Renal/=====  #Hyponatremia  #Electrolytes  #IVORY  Baseline SCr 0.68 (6/4/25)  - SCr uptrending -> 1.6, likely prerenal 2/2 hemodynamic instability, IV contrast use, and decreased oral intake  - CT A/P with IV contrast 6/16 showed kidneys/ureters within normal limits  - F/u Renal Ultrasound  - Nephrology consulted, now following  - Indwelling catheter placed, monitor I/O's  - S/p 4.5L IVF, POCUS showed IVC 1.3   - hyponatremia likely iso of malignancy, decreased PO intake vs SIADH?  - Send urine lytes, serum osms, and urine osms.   - continue to monitor electrolytes closely, maintain K>4, Phos>3, Mag>2, iCal>1    =====Endocrine=====  #No acute issues  A1c- f/u  - blood glucose 120-200's  - TSH 7.29, T3 106    =====Infectious Disease=====  #Septic shock  #Hypotensive and hypothermic  #Leukocytosis  -WBC 13.46 with 10.3% bandemia, hypothermic   -Lactic acid 10.8, downtrending -> 2.3  -Procal >100  -S/p vanc and cefepime x1 in ED  -Blood cultures x 1 with growth of strep species in anaerobic bottle (6/16).   -C/w Empiric Zosyn (6/16-  )  -Follow urine culture  -ID consulted, f/u recs    =====Heme/Onc=====  #DVT Ppx  - Heparin SQ q12h    ====Skin=====  #Extravasation  - Left forearm PIV site- levophed running through PIV with signs of extravasation under ultrasound visualization .Extremity found to be red, mildly edematous, blanchable, pulses intact. patent flow on bedside US. Cap refill present distally,  skin intact without blistering or breakdown.   - Pressor extravasation protocol, S/p phentolamine and Nitropatse applied   - C/w Vascular checks as per protocol    ====Lines=====  Right UA [place]- 6/17  RIJ TLC 6/17  Indwelling Catheter 6/17    =====Ethics=====  FULL CODE

## 2025-06-17 NOTE — SWALLOW BEDSIDE ASSESSMENT ADULT - PHARYNGEAL PHASE
Delayed pharyngeal swallow/Decreased laryngeal elevation/Delayed cough post oral intake/Delayed throat clear post oral intake/Multiple swallows Delayed pharyngeal swallow/Decreased laryngeal elevation/Multiple swallows

## 2025-06-17 NOTE — DIETITIAN INITIAL EVALUATION ADULT - REASON FOR ADMISSION
73 y/o F with metastatic stage IV colon Ca & HTN.  Admitted for sepsis and hypotension requiring pressor support.  Also with abdominal pain, ascites (Hx of paracentesis 6/11 with 6550mL fluid removal) and IVORY.  73 y/o F with metastatic stage IV colon Ca & HTN.  Admitted for sepsis and hypotension requiring pressor support.  Also with diarrhea, abdominal pain, ascites (Hx of paracentesis 6/11 with 6550mL fluid removal) and IVORY.

## 2025-06-17 NOTE — CONSULT NOTE ADULT - SUBJECTIVE AND OBJECTIVE BOX
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  Per EMS daughter had not heard from her mother in 2 days and called for wellness check when EMS arrived she was found surrounded by feces on her bed.  Patient found to be hypotensive and appeared very drowsy.  And route patient received IV fluids about 200 cc.  At the time of my assessment patient is not endorsing any pain.  She does state she had a stomachache last night and multiple episodes of diarrhea.  She denies any falls or trauma to her head.  Patient appears very weak.  Rectal temp taken at bedside 92.4.  POCUS performed at bedside showed no obvious effusions.  IVC collapsible.  No B-lines noted on pulmonary exam.    In the ER:   Vitals notable for temperature of 34.7, tachycardia to 130s, hypotensive to 67/53, hypoxic to 4 L NC -> RA.   Labs: CBC with WBC 13.46 with 10.3% bandemia. Lactic acid 10.8. Full RVP negative. Blood cultures x 1 with growth of strep species in anaerobic bottle (6/16).   Imaging:   CT abdomen pelvis: 1.  Interval increase in bilateral large pleural effusions. 2.  Peritoneal and serosal implants demonstrate mild interval increase in size, however there is significant decrease in attenuation. Findings suggestive of posttreatment changes. 3.  New diffuse mural thickening of the GI tract, suggestive of   gastroenterocolitis. 4.  Mild interval decrease of large ascites with trace pneumoperitoneum, representing changes from prior paracentesis.  CXR: Right lower lung dense opacity with air bronchograms, suspicious for consolidation. Large bilateral pleural effusions better characterized on concurrent CT. Mild pulmonary venous congestion/perihilar interstitial edema  CXR: Status post central line placement. Bilateral large effusions with underlying atelectasis.    Abx:   Zosyn ( 6/17-)   Cefepime ( 6/16-)   Vancomycin ( 6/16-)     REVIEW OF SYSTEMS  pending full examination    prior hospital charts reviewed [V]  primary team notes reviewed [V]  other consultant notes reviewed [V]    PAST MEDICAL & SURGICAL HISTORY:  Hypertension      No significant past surgical history          SOCIAL HISTORY:  Denied smoking/vaping/alcohol/recreational drug use    FAMILY HISTORY:      Allergies  No Known Allergies        ANTIMICROBIALS:  piperacillin/tazobactam IVPB.. 3.375 every 8 hours      ANTIMICROBIALS (past 90 days):  MEDICATIONS  (STANDING):  cefepime   IVPB   100 mL/Hr IV Intermittent (06-16-25 @ 12:30)    piperacillin/tazobactam IVPB..   25 mL/Hr IV Intermittent (06-17-25 @ 13:11)   25 mL/Hr IV Intermittent (06-17-25 @ 02:17)    vancomycin  IVPB.   250 mL/Hr IV Intermittent (06-16-25 @ 13:28)        OTHER MEDS:   MEDICATIONS  (STANDING):  dexMEDEtomidine Infusion 0.1 <Continuous>  heparin   Injectable 5000 every 12 hours  hydrocortisone sodium succinate Injectable 50 every 6 hours  norepinephrine Infusion 0.7 <Continuous>  vasopressin Infusion 0.04 <Continuous>      VITALS:  Vital Signs Last 24 Hrs  T(F): 97.5 (06-17-25 @ 08:00), Max: 98.1 (06-17-25 @ 00:00)    Vital Signs Last 24 Hrs  HR: 87 (06-17-25 @ 13:00) (85 - 132)  BP: 142/127 (06-16-25 @ 21:00) (71/61 - 142/127)  RR: 24 (06-17-25 @ 13:00)  SpO2: 95% (06-17-25 @ 13:00) (85% - 100%)  Wt(kg): --    EXAM:  pending full examination      Labs:                        10.9   x     )-----------( x        ( 17 Jun 2025 14:05 )             31.7     06-17    125[L]  |  95[L]  |  33[H]  ----------------------------<  165[H]  5.2   |  15[L]  |  1.48[H]    Ca    7.3[L]      17 Jun 2025 05:40  Phos  4.8     06-17  Mg     1.80     06-17    TPro  4.1[L]  /  Alb  1.7[L]  /  TBili  0.3  /  DBili  x   /  AST  38[H]  /  ALT  13  /  AlkPhos  74  06-17      WBC Trend:  WBC Count: 31.11 (06-16-25 @ 23:27)  WBC Count: 13.46 (06-16-25 @ 12:16)          Creatine Trend:  Creatinine: 1.48 (06-17)  Creatinine: 1.36 (06-16)  Creatinine: 1.18 (06-16)      Liver Biochemical Testing Trend:  Alanine Aminotransferase (ALT/SGPT): 13 (06-17)  Alanine Aminotransferase (ALT/SGPT): 15 (06-16)  Alanine Aminotransferase (ALT/SGPT): 6 (06-16)  Alanine Aminotransferase (ALT/SGPT): <5 (06-04)  Alanine Aminotransferase (ALT/SGPT): 6 *L* (05-09)  Aspartate Aminotransferase (AST/SGOT): 38 (06-17-25 @ 05:40)  Aspartate Aminotransferase (AST/SGOT): 41 (06-16-25 @ 23:27)  Aspartate Aminotransferase (AST/SGOT): 23 (06-16-25 @ 13:46)  Aspartate Aminotransferase (AST/SGOT): 18 (06-04-25 @ 13:23)  Aspartate Aminotransferase (AST/SGOT): 18 (05-09-25 @ 11:22)  Bilirubin Total: 0.3 (06-17)  Bilirubin Total: 0.3 (06-16)  Bilirubin Total: 0.3 (06-16)  Bilirubin Total: 0.3 (06-04)  Bilirubin Total: 0.3 (05-09)      Trend LDH          MICROBIOLOGY:        Culture - Blood (collected 16 Jun 2025 12:15)  Source: Blood Blood-Peripheral  Preliminary Report:    Growth in anaerobic bottle: Gram Positive Rods    Direct identification is available within approximately 3-5    hours either by Blood Panel Multiplexed PCR or Direct    MALDI-TOF. Details: [email or web address]  Organism: Blood Culture PCR  Organism: Blood Culture PCR    Sensitivities:      Method Type: PCR      -  Streptococcus sp. (Not Grp A, B or S pneumoniae): Detec    Urinalysis with Rflx Culture (collected 09 May 2025 14:03)    Culture - Urine (collected 09 May 2025 14:03)  Source: Clean Catch  Final Report:    Culture grew 3 or more types of organisms which indicate    collection contamination; consider recollection only if clinically    indicated.                        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 Daughter at bedside states 2 days ago, she went to check in on her home in the morning and found her laying in bed in her feces. Patient reports she was having multiple episodes of diarrhea, saturday night into sunday morning, described as watery mixed with some formed stools as well as some vomiting.  No fevers or chills but reports she has been very weak with decreased PO intake. Denies prosthetic devices or heart valves.     In the ER:   Vitals notable for temperature of 34.7, tachycardia to 130s, hypotensive to 67/53, hypoxic to 4 L NC -> RA.   Labs: CBC with WBC 13.46 with 10.3% bandemia. Lactic acid 10.8. Full RVP negative. Blood cultures x 1 with growth of strep species in anaerobic bottle (6/16).   Imaging:   CT abdomen pelvis: 1.  Interval increase in bilateral large pleural effusions. 2.  Peritoneal and serosal implants demonstrate mild interval increase in size, however there is significant decrease in attenuation. Findings suggestive of posttreatment changes. 3.  New diffuse mural thickening of the GI tract, suggestive of gastroenterocolitis. 4.  Mild interval decrease of large ascites with trace pneumoperitoneum, representing changes from prior paracentesis.  CXR: Right lower lung dense opacity with air bronchograms, suspicious for consolidation. Large bilateral pleural effusions better characterized on concurrent CT. Mild pulmonary venous congestion/perihilar interstitial edema    Abx:   Zosyn ( 6/17-)   Cefepime ( 6/16-)   Vancomycin ( 6/16-)     REVIEW OF SYSTEMS  Constitutional: No fevers, No chills, No weight loss, + fatigue   Skin: No rash, no phlebitis	  Eyes: No discharge, No change in vision	  ENMT: No sore throat, No ulcers  Respiratory: No cough, no SOB  Cardiovascular:  No chest pain, No palpitations   Gastrointestinal: No pain, No nausea, + vomiting, + diarrhea, No constipation	  Genitourinary: No dysuria, No frequency, No hesitancy, No flank pain  MSK: No Joint pain, No back pain, No edema  Neurological: No HA, no weakness, no seizures, no AMS     prior hospital charts reviewed [V]  primary team notes reviewed [V]  other consultant notes reviewed [V]    PAST MEDICAL & SURGICAL HISTORY:  Hypertension      No significant past surgical history          SOCIAL HISTORY:  -Denied smoking/vaping/alcohol/recreational drug use    -[place] in the [place]     -no pets at home     -lives alone     FAMILY HISTORY:  -no family hx of cancer       Allergies  No Known Allergies        ANTIMICROBIALS:  piperacillin/tazobactam IVPB.. 3.375 every 8 hours      ANTIMICROBIALS (past 90 days):  MEDICATIONS  (STANDING):  cefepime   IVPB   100 mL/Hr IV Intermittent (06-16-25 @ 12:30)    piperacillin/tazobactam IVPB..   25 mL/Hr IV Intermittent (06-17-25 @ 13:11)   25 mL/Hr IV Intermittent (06-17-25 @ 02:17)    vancomycin  IVPB.   250 mL/Hr IV Intermittent (06-16-25 @ 13:28)        OTHER MEDS:   MEDICATIONS  (STANDING):  dexMEDEtomidine Infusion 0.1 <Continuous>  heparin   Injectable 5000 every 12 hours  hydrocortisone sodium succinate Injectable 50 every 6 hours  norepinephrine Infusion 0.7 <Continuous>  vasopressin Infusion 0.04 <Continuous>      VITALS:  Vital Signs Last 24 Hrs  T(F): 97.5 (06-17-25 @ 08:00), Max: 98.1 (06-17-25 @ 00:00)    Vital Signs Last 24 Hrs  HR: 87 (06-17-25 @ 13:00) (85 - 132)  BP: 142/127 (06-16-25 @ 21:00) (71/61 - 142/127)  RR: 24 (06-17-25 @ 13:00)  SpO2: 95% (06-17-25 @ 13:00) (85% - 100%)  Wt(kg): --    EXAM:  General: +cachetic appearing.   HEENT: NCAT  CV: +S1/S2, RRR, no M/R/G  Lungs: +decreased breath sounds in b/l lower lung fields.   Abd:  +hypoactive bowel sounds, +distended with difficult to localize tenderness.   Neuro: AAOx3. No focal deficits noted.   Ext: No cyanosis, no edema  Msk: freely moving upper and lower extremities  Skin: No rash, no phlebitis, No erythema     Labs:                        10.9   x     )-----------( x        ( 17 Jun 2025 14:05 )             31.7     06-17    125[L]  |  95[L]  |  33[H]  ----------------------------<  165[H]  5.2   |  15[L]  |  1.48[H]    Ca    7.3[L]      17 Jun 2025 05:40  Phos  4.8     06-17  Mg     1.80     06-17    TPro  4.1[L]  /  Alb  1.7[L]  /  TBili  0.3  /  DBili  x   /  AST  38[H]  /  ALT  13  /  AlkPhos  74  06-17      WBC Trend:  WBC Count: 31.11 (06-16-25 @ 23:27)  WBC Count: 13.46 (06-16-25 @ 12:16)          Creatine Trend:  Creatinine: 1.48 (06-17)  Creatinine: 1.36 (06-16)  Creatinine: 1.18 (06-16)      Liver Biochemical Testing Trend:  Alanine Aminotransferase (ALT/SGPT): 13 (06-17)  Alanine Aminotransferase (ALT/SGPT): 15 (06-16)  Alanine Aminotransferase (ALT/SGPT): 6 (06-16)  Alanine Aminotransferase (ALT/SGPT): <5 (06-04)  Alanine Aminotransferase (ALT/SGPT): 6 *L* (05-09)  Aspartate Aminotransferase (AST/SGOT): 38 (06-17-25 @ 05:40)  Aspartate Aminotransferase (AST/SGOT): 41 (06-16-25 @ 23:27)  Aspartate Aminotransferase (AST/SGOT): 23 (06-16-25 @ 13:46)  Aspartate Aminotransferase (AST/SGOT): 18 (06-04-25 @ 13:23)  Aspartate Aminotransferase (AST/SGOT): 18 (05-09-25 @ 11:22)  Bilirubin Total: 0.3 (06-17)  Bilirubin Total: 0.3 (06-16)  Bilirubin Total: 0.3 (06-16)  Bilirubin Total: 0.3 (06-04)  Bilirubin Total: 0.3 (05-09)      Trend LDH          MICROBIOLOGY:        Culture - Blood (collected 16 Jun 2025 12:15)  Source: Blood Blood-Peripheral  Preliminary Report:    Growth in anaerobic bottle: Gram Positive Rods    Direct identification is available within approximately 3-5    hours either by Blood Panel Multiplexed PCR or Direct    MALDI-TOF. Details: [email or web address]  Organism: Blood Culture PCR  Organism: Blood Culture PCR    Sensitivities:      Method Type: PCR      -  Streptococcus sp. (Not Grp A, B or S pneumoniae): Detec    Urinalysis with Rflx Culture (collected 09 May 2025 14:03)    Culture - Urine (collected 09 May 2025 14:03)  Source: Clean Catch  Final Report:    Culture grew 3 or more types of organisms which indicate    collection contamination; consider recollection only if clinically    indicated.                        Rapid RVP Result: NotDetec (06-16 @ 12:16)          Procalcitonin: > 100.00 (06-16)                Blood Gas Arterial, Lactate: 2.3 (06-17 @ 14:05)  Blood Gas Arterial, Lactate: 2.7 (06-17 @ 05:40)  Blood Gas Arterial, Lactate: 3.4 (06-16 @ 23:27)  Blood Gas Venous - Lactate: 8.8 (06-16 @ 13:46)        RADIOLOGY:    ACC: [id number] EXAM:  CT ABDOMEN AND PELVIS IC   ORDERED BY: JACOBO CHAVIS     PROCEDURE DATE:  06/16/2025          INTERPRETATION:  CLINICAL INFORMATION: Persistent diarrhea. History of   colon cancer.    COMPARISON: Abdomen ultrasound 6/11/2025. CT abdomen and pelvis 6/4/2025.    CONTRAST/COMPLICATIONS:  IV Contrast: Omnipaque 350  70 cc administered   30 cc discarded  Oral Contrast: NONE.    PROCEDURE:  CT of the Abdomen and Pelvis was performed.  Sagittal and coronal reformats were performed.    FINDINGS:  LOWER CHEST: Large bilateral pleural effusions with associated   compressive atelectasis. Both pleural effusions has increased since   prior, with greater interval increase on the left side.    LIVER: Scalloping of the hepatic Contour most pronounced in the right   hepatic dome. Few subcentimeter hypodense foci, too small to characterize.  BILE DUCTS: Normal caliber.  GALLBLADDER: Gallbladder wall thickening and mucosal hyperenhancement.   Findings may be reactive to ascites.  SPLEEN: Within normal limits.  PANCREAS: Within normal limits.  ADRENALS: Within normal limits.  KIDNEYS/URETERS: Within normal limits.    BLADDER: Within normal limits.  REPRODUCTIVE ORGANS: Atrophic uterus.    BOWEL: Small hiatal hernia. New moderate diffuse edematous mural   thickening of the GI tract involving stomach, small bowel, and colon.   Colonic diverticulosis.  No bowel obstruction.  PERITONEUM/RETROPERITONEUM: Large volume ascites, slightly decreased   since prior. Trace pneumoperitoneum, likely secondary to recent   paracentesis.  Enhancing peritoneal reflections. Peritoneal carcinomatosis. Previously   noted peritoneal implants demonstrate interval increase in size, however   interval decrease in attenuation.  Reference lesions:  An implant adjacent to the falciform ligament, measuring 3.1 x 2.2 cm   (301-48), previously measuring 2.9 x 2.3 cm.  Right upper quadrant subdiaphragmatic lesion, measuring 7.7 x 6.1 cm   (301/38), previously 7.6 x 4.6 cm.  Previously noted serosal implants along the transverse colon demonstrates   interval decrease in attenuation.  VESSELS: Atherosclerotic changes.  LYMPH NODES: Previously seen peripancreatic lymph node is not well   visualized in this study.  ABDOMINAL WALL: Cutaneous defecton the right mid abdominal wall   measuring of up to 6.2 cm (301-75). Otherwise within normal limits.  BONES: Degenerative changes.    IMPRESSION:  1.  Interval increase in bilateral large pleural effusions.  2.  Peritoneal and serosal implants demonstrate mild interval increase in   size, however there is significant decrease in attenuation. Findings   suggestive of posttreatment changes.  3.  New diffuse mural thickening of the GI tract, suggestive of   gastroenterocolitis.  4.  Mild interval decrease of large ascites with trace pneumoperitoneum,   representing changes from prior paracentesis.  ,      --- End of Report ---          GUSTAVO PERYR MD; Resident Radiologist  This document has been electronically signed.  ROSAS WARD MD; Attending Radiologist  This document has been electronically signed. Jun 16 2025  4:02PM

## 2025-06-17 NOTE — DIETITIAN INITIAL EVALUATION ADULT - OTHER INFO
Spoke with RN.  Pt. confused/disoriented, hence NPO, pending dysphagia screen by nursing.      Met with Pt. and son and daughter @ bedside.  Pt. denies any nausea @ this time.  Informed Pt. and family of current NPO status.  Pt. endorses feeling "a little hungry" and is also asking for water.   When asked, no reported Hx of chewing/swallowing issues PTA.  RDN explained purpose of dysphagia screen by nursing.  Daughter & son with questions regarding this information.  Discussed that if there is concern with Pt.'s swallowing function, it may warrant a swallow evaluation by SLP to help determine appropriateness of PO diet.    Son and daughter verbalize understanding.

## 2025-06-17 NOTE — CONSULT NOTE ADULT - ATTENDING COMMENTS
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 Potential GI source based on imaging  Overall, Bacteremia, Leukocytosis  - Zosyn 3.375g q 8, monitor CrCl, borderline, would need decreased dose if worsening Cr  - Repeat BCXs for clear  - Hold on ROOPA for now  - If checking paracentesis, would check cells and cultures  - Trend WBC to normal  - Monitor for other sources  - Next steps pending clinical progression    Henry Flores MD  Contact on TEAMS messaging from 9am - 5pm  From 5pm-9am, on weekends, or if no response call [phone]    Antibiotic decision making based on local antibiogram and available recent culture results
no planned inpatient chemotherapy  would hold capecitabine  d/w outpatient oncologist   defer management of acute issues to MICU team great care   d/w family at bedside
IVORY  Hyponatremia    Bladder scan and consider placing indwelling brandt  Patient appears volume depleted and likely with volume loss, would send urine studies  Consider NS IV  Monitor labs and Is/Os, daily weights

## 2025-06-17 NOTE — CONSULT NOTE ADULT - SUBJECTIVE AND OBJECTIVE BOX
[facility] DIVISION OF KIDNEY DISEASES AND HYPERTENSION -- [phone]  -- INITIAL CONSULT NOTE  --------------------------------------------------------------------------------  HPI: Pt. is a 74 t.o. F w/ PMHx colon cancer stage IV brought in by EMS from home status post wellness check. Nephrology consulted for IVORY and hyponatremia.    Pt. seen and examined in the MICU. Hx as per chart as pt. cannot provide much history. Per EMS daughter had not heard from her mother in 2 days and called for wellness check when EMS arrived she was found surrounded by feces on her bed.  Patient found to be hypotensive and appeared very drowsy.  And route patient received IV fluids about 200 cc.  At the time of my assessment patient is not endorsing any pain.  She does state she had a stomachache last night and multiple episodes of diarrhea.  She denies any falls or trauma to her head.  Patient appears very weak.  Rectal temp taken at bedside 92.4.  POCUS performed at bedside showed no obvious effusions.  IVC collapsible.  No B-lines noted on pulmonary exam. Pt. denies fever, chills, CP, or LE swelling.    PAST HISTORY  --------------------------------------------------------------------------------  PAST MEDICAL & SURGICAL HISTORY:  Hypertension    No significant past surgical history    FAMILY HISTORY:    PAST SOCIAL HISTORY:    ALLERGIES & MEDICATIONS  --------------------------------------------------------------------------------  Allergies    No Known Allergies    Intolerances    Standing Inpatient Medications  chlorhexidine 2% Cloths 1 Application(s) Topical <User Schedule>  dexMEDEtomidine Infusion 0.1 MICROgram(s)/kG/Hr IV Continuous <Continuous>  heparin   Injectable 5000 Unit(s) SubCutaneous every 12 hours  hydrocortisone sodium succinate Injectable 50 milliGRAM(s) IV Push every 6 hours  lactated ringers. 1000 milliLiter(s) IV Continuous <Continuous>  norepinephrine Infusion 0.7 MICROgram(s)/kG/Min IV Continuous <Continuous>  piperacillin/tazobactam IVPB.. 3.375 Gram(s) IV Intermittent every 8 hours  sodium bicarbonate  Infusion 0.51 mEq/kG/Hr IV Continuous <Continuous>  vasopressin Infusion 0.04 Unit(s)/Min IV Continuous <Continuous>    PRN Inpatient Medications    REVIEW OF SYSTEMS  --------------------------------------------------------------------------------  Gen: No fevers/chills  Skin: No rash  Head/Eyes/Ears: No headache   Respiratory: No dyspnea  CV: No chest pain  GI: No abdominal pain  : No dysuria  MSK: No edema  Heme: No easy bruising or bleeding  Psych: No significant depression    All other systems were reviewed and are negative, except as noted.    VITALS/PHYSICAL EXAM  --------------------------------------------------------------------------------  T(C): 36.4 (06-17-25 @ 08:00), Max: 36.7 (06-17-25 @ 00:00)  HR: 87 (06-17-25 @ 13:00) (85 - 132)  BP: 142/127 (06-16-25 @ 21:00) (71/61 - 142/127)  RR: 24 (06-17-25 @ 13:00) (21 - 41)  SpO2: 95% (06-17-25 @ 13:00) (85% - 100%)  Wt(kg): --  Height (cm): 170.2 (06-16-25 @ 13:12)  Weight (kg): 44.1 (06-16-25 @ 20:05)  BMI (kg/m2): 15.2 (06-16-25 @ 20:05)  BSA (m2): 1.49 (06-16-25 @ 20:05)    06-16-25 @ 07:01  -  06-17-25 @ 07:00  --------------------------------------------------------  IN: 1905.3 mL / OUT: 25 mL / NET: 1880.3 mL    06-17-25 @ 07:01  -  06-17-25 @ 14:37  --------------------------------------------------------  IN: 880.2 mL / OUT: 5 mL / NET: 875.2 mL    Physical Exam:  Gen: ill appearing  HEENT: slightly dry mucous membranes  Pulm: Decreased breath sounds at bases, on supplemental O2  CV: S1S2  Abd: Soft, +BS   Ext: +B/l LE edema B/L  Neuro: Awake  Skin: Warm and dry  Vascular access: peripheral IVs, R IJ central line    LABS/STUDIES  --------------------------------------------------------------------------------              13.8   31.11 >-----------<  418      [06-16-25 @ 23:27]              39.3     125  |  95  |  33  ----------------------------<  165      [06-17-25 @ 05:40]  5.2   |  15  |  1.48        Ca     7.3     [06-17-25 @ 05:40]      Mg     1.80     [06-17-25 @ 05:40]      Phos  4.8     [06-17-25 @ 05:40]    TPro  4.1  /  Alb  1.7  /  TBili  0.3  /  DBili  x   /  AST  38  /  ALT  13  /  AlkPhos  74  [06-17-25 @ 05:40]    PT/INR: PT 16.8 , INR 1.42       [06-16-25 @ 23:27]  PTT: 33.5       [06-16-25 @ 23:27]    Uric acid 4.7      [06-17-25 @ 11:30]    Creatinine Trend:  SCr 1.48 [06-17 @ 05:40]  SCr 1.36 [06-16 @ 23:27]  SCr 1.18 [06-16 @ 13:46]  SCr 0.68 [06-04 @ 13:23]    TSH 7.29      [06-16-25 @ 23:27] [facility] DIVISION OF KIDNEY DISEASES AND HYPERTENSION -- [phone]  -- INITIAL CONSULT NOTE  --------------------------------------------------------------------------------  HPI: Pt. is a 74 t.o. F w/ PMHx colon cancer stage IV brought in by EMS from home status post wellness check. Nephrology consulted for IVORY and hyponatremia.    Pt. seen and examined in the MICU. Hx as per chart as pt. cannot provide much history. Per EMS daughter had not heard from her mother in 2 days and called for wellness check when EMS arrived she was found surrounded by feces on her bed.  Patient found to be hypotensive and appeared very drowsy.  And route patient received IV fluids about 200 cc.  At the time of my assessment patient is not endorsing any pain.  She does state she had a stomachache last night and multiple episodes of diarrhea.  She denies any falls or trauma to her head.  Patient appears very weak.  Rectal temp taken at bedside 92.4.  POCUS performed at bedside showed no obvious effusions.  IVC collapsible.  No B-lines noted on pulmonary exam. Pt. denies fever, chills, CP, or LE swelling.    PAST HISTORY  --------------------------------------------------------------------------------  PAST MEDICAL & SURGICAL HISTORY:  Hypertension    No significant past surgical history    FAMILY HISTORY: Reviewed and non contributory    PAST SOCIAL HISTORY: No smoking drugs or alcohol use, lives alone    ALLERGIES & MEDICATIONS  --------------------------------------------------------------------------------  Allergies    No Known Allergies    Intolerances    Standing Inpatient Medications  chlorhexidine 2% Cloths 1 Application(s) Topical <User Schedule>  dexMEDEtomidine Infusion 0.1 MICROgram(s)/kG/Hr IV Continuous <Continuous>  heparin   Injectable 5000 Unit(s) SubCutaneous every 12 hours  hydrocortisone sodium succinate Injectable 50 milliGRAM(s) IV Push every 6 hours  lactated ringers. 1000 milliLiter(s) IV Continuous <Continuous>  norepinephrine Infusion 0.7 MICROgram(s)/kG/Min IV Continuous <Continuous>  piperacillin/tazobactam IVPB.. 3.375 Gram(s) IV Intermittent every 8 hours  sodium bicarbonate  Infusion 0.51 mEq/kG/Hr IV Continuous <Continuous>  vasopressin Infusion 0.04 Unit(s)/Min IV Continuous <Continuous>    PRN Inpatient Medications    REVIEW OF SYSTEMS  --------------------------------------------------------------------------------  Gen: No fevers/chills  Skin: No rash  Head/Eyes/Ears: No headache   Respiratory: No dyspnea  CV: No chest pain  GI: No abdominal pain  : No dysuria  MSK: No edema  Heme: No easy bruising or bleeding  Psych: No significant depression    All other systems were reviewed and are negative, except as noted.    VITALS/PHYSICAL EXAM  --------------------------------------------------------------------------------  T(C): 36.4 (06-17-25 @ 08:00), Max: 36.7 (06-17-25 @ 00:00)  HR: 87 (06-17-25 @ 13:00) (85 - 132)  BP: 142/127 (06-16-25 @ 21:00) (71/61 - 142/127)  RR: 24 (06-17-25 @ 13:00) (21 - 41)  SpO2: 95% (06-17-25 @ 13:00) (85% - 100%)  Wt(kg): --  Height (cm): 170.2 (06-16-25 @ 13:12)  Weight (kg): 44.1 (06-16-25 @ 20:05)  BMI (kg/m2): 15.2 (06-16-25 @ 20:05)  BSA (m2): 1.49 (06-16-25 @ 20:05)    06-16-25 @ 07:01  -  06-17-25 @ 07:00  --------------------------------------------------------  IN: 1905.3 mL / OUT: 25 mL / NET: 1880.3 mL    06-17-25 @ 07:01  -  06-17-25 @ 14:37  --------------------------------------------------------  IN: 880.2 mL / OUT: 5 mL / NET: 875.2 mL    Physical Exam:  Gen: ill appearing  HEENT: slightly dry mucous membranes  Pulm: Decreased breath sounds at bases, on supplemental O2  CV: S1S2  Abd: Soft, +BS   Ext: +B/l LE edema B/L  Neuro: Awake  Skin: Warm and dry  Vascular access: peripheral IVs, R IJ central line    LABS/STUDIES  --------------------------------------------------------------------------------              13.8   31.11 >-----------<  418      [06-16-25 @ 23:27]              39.3     125  |  95  |  33  ----------------------------<  165      [06-17-25 @ 05:40]  5.2   |  15  |  1.48        Ca     7.3     [06-17-25 @ 05:40]      Mg     1.80     [06-17-25 @ 05:40]      Phos  4.8     [06-17-25 @ 05:40]    TPro  4.1  /  Alb  1.7  /  TBili  0.3  /  DBili  x   /  AST  38  /  ALT  13  /  AlkPhos  74  [06-17-25 @ 05:40]    PT/INR: PT 16.8 , INR 1.42       [06-16-25 @ 23:27]  PTT: 33.5       [06-16-25 @ 23:27]    Uric acid 4.7      [06-17-25 @ 11:30]    Creatinine Trend:  SCr 1.48 [06-17 @ 05:40]  SCr 1.36 [06-16 @ 23:27]  SCr 1.18 [06-16 @ 13:46]  SCr 0.68 [06-04 @ 13:23]    TSH 7.29      [06-16-25 @ 23:27]

## 2025-06-17 NOTE — DIETITIAN INITIAL EVALUATION ADULT - NUTRITION DIAGNOSITC TERMINOLOGY #1
[address] [id number]  Phone: [phone]  Fax: [phone]    Suzanna Hidalgo MD        December 31, 2018     Patient: Tom Damon   YOB: 1957   Date of Visit: 12/31/2018       To Whom It May Concern: It is my medical opinion that [employer] may return to work on 01/07/2019. Off work 01/02/2019 through 01/06/2019 for acute bronchitis. If you have any questions or concerns, please don't hesitate to call.     Sincerely,        Suzanna Hidalgo MD Malnutrition...

## 2025-06-17 NOTE — CONSULT NOTE ADULT - PROBLEM SELECTOR RECOMMENDATION 2
Pt. with hyponatremia in the setting of malignancy, decreased PO intake and ADH stimulation due to pain. On review of [facility] HIE/[facility], SNa WNL at 135 on 6/4/25. SNa low at 131 on admission (6/16/25). SNa with further decrease to 125 today (6/17/25). Pt. now initiated on bicarb infusion and steroids. Please obtain urine lytes, serum osms, and urine osms. Pt. likely with SIADH mediated hyponatremia. Recommend fluid restriction. Continue with furosemide. Optimize glucose control. Monitor SNa.    **Recommendations preliminary until attending attestation**  If you have any questions, please feel free to contact me  [place] Alva  Nephrology Fellow  Page [phone] / [employer] Teams (Preferred)  (After 4pm or on weekends please page the on-call fellow)

## 2025-06-17 NOTE — DIETITIAN INITIAL EVALUATION ADULT - REASON INDICATOR FOR ASSESSMENT
- MST Score >2 (weight loss, decrease in PO intake due to poor appetite)     - Critical Care Admission

## 2025-06-17 NOTE — DIETITIAN NUTRITION RISK NOTIFICATION - TREATMENT: THE FOLLOWING DIET HAS BEEN RECOMMENDED
Diet, NPO:   Except Medications     Special Instructions for Nursing:  Except Medications (06-16-25 @ 17:55) [Active]

## 2025-06-17 NOTE — CONSULT NOTE ADULT - ASSESSMENT
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 No fevers or chills but reports she has been very weak with decreased PO intake. Denies prosthetic devices or heart valves.     On presentation, vitals notable for hypothermia to 34.7, tachycardia to 130s, hypotension to 67/53, and hypoxia to 4 L NC.     Labs notable for leukocytosis to 13.46 with 10.3% bandemia.     Infectious workup/imaging:   -Lactic acid 10.8  -Full RVP negative  -Blood cultures x 1 with growth of strep species in anaerobic bottle (6/16).    -CT abdomen pelvis: 1.  Interval increase in bilateral large pleural effusions. 2.  Peritoneal and serosal implants demonstrate mild interval increase in size, however there is significant decrease in attenuation. Findings suggestive of posttreatment changes. 3.  New diffuse mural thickening of the GI tract, suggestive of gastroenterocolitis. 4.  Mild interval decrease of large ascites with trace pneumoperitoneum, representing changes from prior paracentesis.  CXR: Right lower lung dense opacity with air bronchograms, suspicious for consolidation. Large bilateral pleural effusions better characterized on concurrent CT. Mild pulmonary venous congestion/perihilar interstitial edema  -TTE without vegetations     Abx:   Zosyn ( 6/17-)   Cefepime ( 6/16-)   Vancomycin ( 6/16-)     #septic shock   #strep bacteremia  #imaging findings of enterocolitis   #peritoneal mets   -concern that strep bacteremia may be from GI source   -continue with Zosyn 3.375 gm IVPB q8h   -obtain 2 sets of repeat blood cultures  -if plan to obtain paracentesis ( presentation could be secondary to secondary bacterial peritonitis), would send ascitic fluid for cell count, protein, glucose, albumin, culture and gram stain   -f/u all culture data  -monitor WBC and fever curve     Case seen and discussed with Dr. Flores who agrees with assessment and plan. Note not final until attending addendum.

## 2025-06-17 NOTE — SWALLOW BEDSIDE ASSESSMENT ADULT - ASR SWALLOW REFERRAL
RD to ensure adequate nutrition/hydration & provide potential management of tube feeds pending MD discretion.

## 2025-06-18 NOTE — PROGRESS NOTE ADULT - CRITICAL CARE ATTENDING COMMENT
Patient is a 75 yo F w/ Stage IV Colon Ca and HTN who is admitted to MICU for septic shock in setting of Strep bacteremia in setting of gastroenterocolitis.    #Septic Shock  #Strep Bacteremia  #Gastroenterocolitis  #IVORY  #Encephalopathy  - Hold sedating medications, check ABG. Monitor end tidal  - Monitor airway, NPO for now  - c/w vasopressors to maintain MAP > 65, wean as tolerated, improved. Will hold Vaso  - c/w stress dose steroids for now, wean to Hydrocort 50 q8h for now  - c/w empiric Zosyn, f/u cultures  - ID consult appreciated  - Hold bicarb gtt, Lasix 40mg IV x1  - Monitor UOP and BMP  - Check Free T4, TSH elevated to 7.3  - Ascites appears simple, diagnositic para likely low yield now after abx, family had deferred yesterday    #DVT ppx - HSQ    #GOC - Full Code    Morris Neil MD  Pulmonary & Critical Care

## 2025-06-18 NOTE — PROGRESS NOTE ADULT - SUBJECTIVE AND OBJECTIVE BOX
INTERVAL HPI/OVERNIGHT EVENTS: remains on levophed 0.08 and vaso 0.04.     SUBJECTIVE: Patient seen and examined at bedside.       VITAL SIGNS:  ICU Vital Signs Last 24 Hrs  T(C): 36.2 (18 Jun 2025 04:00), Max: 37.2 (18 Jun 2025 00:00)  T(F): 97.1 (18 Jun 2025 04:00), Max: 98.9 (18 Jun 2025 00:00)  HR: 74 (18 Jun 2025 07:00) (63 - 93)  BP: 99/71 (18 Jun 2025 07:00) (78/56 - 113/77)  BP(mean): 81 (18 Jun 2025 07:00) (64 - 89)  ABP: 119/75 (17 Jun 2025 17:00) (98/48 - 120/65)  ABP(mean): 94 (17 Jun 2025 17:00) (67 - 94)  RR: 22 (18 Jun 2025 07:00) (16 - 27)  SpO2: 100% (18 Jun 2025 07:00) (91% - 100%)    O2 Parameters below as of 18 Jun 2025 07:00  Patient On (Oxygen Delivery Method): nasal cannula  O2 Flow (L/min): 6        06-17 @ 07:01  -  06-18 @ 07:00  --------------------------------------------------------  IN: 4147.9 mL / OUT: 650 mL / NET: 3497.9 mL      CAPILLARY BLOOD GLUCOSE      POCT Blood Glucose.: 236 mg/dL (17 Jun 2025 17:15)    ECG:    PHYSICAL EXAM:  GENERAL: NAD, chronically ill appearing   HEAD:  Atraumatic, Normocephalic  EYES: EOMI, PERRLA, conjunctiva and sclera clear  NECK: Supple, No JVD, Normal thyroid  CHEST/LUNG: Clear to percussion bilaterally; No rales, rhonchi, wheezing, or rubs  HEART: Regular rate and rhythm; No murmurs, rubs, or gallops  ABDOMEN: Soft, Nontender, Nondistended; Bowel sounds present  NERVOUS SYSTEM: Lethargic, easily arousable, following commands at times  EXTREMITIES:  2+ Peripheral Pulses, No clubbing, cyanosis, or edema  LYMPH: No lymphadenopathy noted  SKIN: No rashes or lesions    MEDICATIONS:  MEDICATIONS  (STANDING):  chlorhexidine 2% Cloths 1 Application(s) Topical <User Schedule>  dexMEDEtomidine Infusion 0.1 MICROgram(s)/kG/Hr (1.14 mL/Hr) IV Continuous <Continuous>  heparin   Injectable 5000 Unit(s) SubCutaneous every 12 hours  hydrocortisone sodium succinate Injectable 50 milliGRAM(s) IV Push every 6 hours  mupirocin 2% Nasal 1 Application(s) Both Nostrils two times a day  nitroglycerin    2% Ointment 2 Inch(s) Transdermal once  norepinephrine Infusion 0.7 MICROgram(s)/kG/Min (29.8 mL/Hr) IV Continuous <Continuous>  pantoprazole  Injectable 40 milliGRAM(s) IV Push daily  piperacillin/tazobactam IVPB.. 3.375 Gram(s) IV Intermittent every 8 hours  sodium bicarbonate  Infusion 0.51 mEq/kG/Hr (150 mL/Hr) IV Continuous <Continuous>  vasopressin Infusion 0.04 Unit(s)/Min (6 mL/Hr) IV Continuous <Continuous>    MEDICATIONS  (PRN):      ALLERGIES:  Allergies    No Known Allergies    Intolerances        LABS:                        10.2   26.56 )-----------( 168      ( 18 Jun 2025 01:44 )             31.8     06-18    128[L]  |  89[L]  |  36[H]  ----------------------------<  229[H]  5.2   |  25  |  1.64[H]    Ca    7.4[L]      18 Jun 2025 01:44  Phos  5.6     06-18  Mg     1.80     06-18    TPro  4.2[L]  /  Alb  1.7[L]  /  TBili  0.2  /  DBili  x   /  AST  69[H]  /  ALT  20  /  AlkPhos  88  06-18    PT/INR - ( 18 Jun 2025 01:44 )   PT: 15.8 sec;   INR: 1.37 ratio         PTT - ( 18 Jun 2025 01:44 )  PTT:37.7 sec  Urinalysis Basic - ( 18 Jun 2025 01:44 )    Color: x / Appearance: x / SG: x / pH: x  Gluc: 229 mg/dL / Ketone: x  / Bili: x / Urobili: x   Blood: x / Protein: x / Nitrite: x   Leuk Esterase: x / RBC: x / WBC x   Sq Epi: x / Non Sq Epi: x / Bacteria: x        RADIOLOGY & ADDITIONAL TESTS: Reviewed.   INTERVAL HPI/OVERNIGHT EVENTS: remains on levophed 0.08 and vaso 0.04.     SUBJECTIVE: Patient seen and examined at bedside. lethargic, endorses abdominal pain.       VITAL SIGNS:  ICU Vital Signs Last 24 Hrs  T(C): 36.2 (18 Jun 2025 04:00), Max: 37.2 (18 Jun 2025 00:00)  T(F): 97.1 (18 Jun 2025 04:00), Max: 98.9 (18 Jun 2025 00:00)  HR: 74 (18 Jun 2025 07:00) (63 - 93)  BP: 99/71 (18 Jun 2025 07:00) (78/56 - 113/77)  BP(mean): 81 (18 Jun 2025 07:00) (64 - 89)  ABP: 119/75 (17 Jun 2025 17:00) (98/48 - 120/65)  ABP(mean): 94 (17 Jun 2025 17:00) (67 - 94)  RR: 22 (18 Jun 2025 07:00) (16 - 27)  SpO2: 100% (18 Jun 2025 07:00) (91% - 100%)    O2 Parameters below as of 18 Jun 2025 07:00  Patient On (Oxygen Delivery Method): nasal cannula  O2 Flow (L/min): 6      06-17 @ 07:01  -  06-18 @ 07:00  --------------------------------------------------------  IN: 4147.9 mL / OUT: 650 mL / NET: 3497.9 mL      CAPILLARY BLOOD GLUCOSE      POCT Blood Glucose.: 236 mg/dL (17 Jun 2025 17:15)    ECG:    PHYSICAL EXAM:  GENERAL: NAD, chronically ill appearing   HEAD:  Atraumatic, Normocephalic  EYES: EOMI, PERRLA, conjunctiva and sclera clear  NECK: Supple, No JVD, Normal thyroid  CHEST/LUNG: Clear to percussion bilaterally; No rales, rhonchi, wheezing, or rubs  HEART: Regular rate and rhythm; No murmurs, rubs, or gallops  ABDOMEN: Soft, Nontender, Nondistended; Bowel sounds present  NERVOUS SYSTEM: Lethargic, easily arousable, following commands at times  EXTREMITIES:  2+ Peripheral Pulses, No clubbing, cyanosis, or edema  LYMPH: No lymphadenopathy noted  SKIN: No rashes or lesions    MEDICATIONS:  MEDICATIONS  (STANDING):  chlorhexidine 2% Cloths 1 Application(s) Topical <User Schedule>  dexMEDEtomidine Infusion 0.1 MICROgram(s)/kG/Hr (1.14 mL/Hr) IV Continuous <Continuous>  heparin   Injectable 5000 Unit(s) SubCutaneous every 12 hours  hydrocortisone sodium succinate Injectable 50 milliGRAM(s) IV Push every 6 hours  mupirocin 2% Nasal 1 Application(s) Both Nostrils two times a day  nitroglycerin    2% Ointment 2 Inch(s) Transdermal once  norepinephrine Infusion 0.7 MICROgram(s)/kG/Min (29.8 mL/Hr) IV Continuous <Continuous>  pantoprazole  Injectable 40 milliGRAM(s) IV Push daily  piperacillin/tazobactam IVPB.. 3.375 Gram(s) IV Intermittent every 8 hours  sodium bicarbonate  Infusion 0.51 mEq/kG/Hr (150 mL/Hr) IV Continuous <Continuous>  vasopressin Infusion 0.04 Unit(s)/Min (6 mL/Hr) IV Continuous <Continuous>    MEDICATIONS  (PRN):      ALLERGIES:  Allergies    No Known Allergies    Intolerances        LABS:                        10.2   26.56 )-----------( 168      ( 18 Jun 2025 01:44 )             31.8     06-18    128[L]  |  89[L]  |  36[H]  ----------------------------<  229[H]  5.2   |  25  |  1.64[H]    Ca    7.4[L]      18 Jun 2025 01:44  Phos  5.6     06-18  Mg     1.80     06-18    TPro  4.2[L]  /  Alb  1.7[L]  /  TBili  0.2  /  DBili  x   /  AST  69[H]  /  ALT  20  /  AlkPhos  88  06-18    PT/INR - ( 18 Jun 2025 01:44 )   PT: 15.8 sec;   INR: 1.37 ratio         PTT - ( 18 Jun 2025 01:44 )  PTT:37.7 sec  Urinalysis Basic - ( 18 Jun 2025 01:44 )    Color: x / Appearance: x / SG: x / pH: x  Gluc: 229 mg/dL / Ketone: x  / Bili: x / Urobili: x   Blood: x / Protein: x / Nitrite: x   Leuk Esterase: x / RBC: x / WBC x   Sq Epi: x / Non Sq Epi: x / Bacteria: x        RADIOLOGY & ADDITIONAL TESTS: Reviewed.

## 2025-06-18 NOTE — PROGRESS NOTE ADULT - SUBJECTIVE AND OBJECTIVE BOX
[facility] Division of Kidney Diseases & Hypertension  FOLLOW UP NOTE  [phone]--------------------------------------------------------------------------------  Chief Complaint: IVORY    24 hour events/subjective: Pt. seen and examined earlier today with sons bedside. Unable to obtain ROS due to clinical status.     PAST HISTORY  --------------------------------------------------------------------------------  No significant changes to PMH, PSH, FHx, SHx, unless otherwise noted    ALLERGIES & MEDICATIONS  --------------------------------------------------------------------------------  Allergies    No Known Allergies    Intolerances    Standing Inpatient Medications  chlorhexidine 2% Cloths 1 Application(s) Topical <User Schedule>  dexMEDEtomidine Infusion 0.1 MICROgram(s)/kG/Hr IV Continuous <Continuous>  furosemide   Injectable 40 milliGRAM(s) IV Push once  heparin   Injectable 5000 Unit(s) SubCutaneous every 12 hours  hydrocortisone sodium succinate Injectable 50 milliGRAM(s) IV Push every 8 hours  mupirocin 2% Nasal 1 Application(s) Both Nostrils two times a day  nitroglycerin    2% Ointment 2 Inch(s) Transdermal once  norepinephrine Infusion 0.7 MICROgram(s)/kG/Min IV Continuous <Continuous>  pantoprazole  Injectable 40 milliGRAM(s) IV Push daily  piperacillin/tazobactam IVPB.. 3.375 Gram(s) IV Intermittent every 8 hours  vasopressin Infusion 0.04 Unit(s)/Min IV Continuous <Continuous>    PRN Inpatient Medications    REVIEW OF SYSTEMS  --------------------------------------------------------------------------------  Unable to obtain ROS.     VITALS/PHYSICAL EXAM  --------------------------------------------------------------------------------  T(C): 36.6 (06-18-25 @ 08:00), Max: 37.2 (06-18-25 @ 00:00)  HR: 89 (06-18-25 @ 09:00) (63 - 93)  BP: 95/69 (06-18-25 @ 08:00) (78/56 - 133/82)  RR: 18 (06-18-25 @ 09:00) (16 - 26)  SpO2: 100% (06-18-25 @ 09:00) (91% - 100%)  Wt(kg): --  Height (cm): 170.2 (06-16-25 @ 13:12)  Weight (kg): 44.1 (06-16-25 @ 20:05)  BMI (kg/m2): 15.2 (06-16-25 @ 20:05)  BSA (m2): 1.49 (06-16-25 @ 20:05)    06-17-25 @ 07:01  -  06-18-25 @ 07:00  --------------------------------------------------------  IN: 4147.9 mL / OUT: 685 mL / NET: 3462.9 mL    06-18-25 @ 07:01  -  06-18-25 @ 09:59  --------------------------------------------------------  IN: 417 mL / OUT: 40 mL / NET: 377 mL    Physical Exam:  Gen: ill appearing  HEENT: slightly dry mucous membranes  Pulm: Decreased breath sounds at bases, on supplemental O2  CV: S1S2  Abd: Soft, +BS   Ext: +B/l LE edema B/L  Neuro: Awake  Skin: Warm and dry  Vascular access: peripheral IVs, R IJ central line    LABS/STUDIES  --------------------------------------------------------------------------------              10.2   26.56 >-----------<  168      [06-18-25 @ 01:44]              31.8     128  |  89  |  36  ----------------------------<  229      [06-18-25 @ 01:44]  5.2   |  25  |  1.64        Ca     7.4     [06-18-25 @ 01:44]      Mg     1.80     [06-18-25 @ 01:44]      Phos  5.6     [06-18-25 @ 01:44]    TPro  4.2  /  Alb  1.7  /  TBili  0.2  /  DBili  x   /  AST  69  /  ALT  20  /  AlkPhos  88  [06-18-25 @ 01:44]    PT/INR: PT 15.8 , INR 1.37       [06-18-25 @ 01:44]  PTT: 37.7       [06-18-25 @ 01:44]    Uric acid 4.7      [06-17-25 @ 11:30]  Serum Osmolality 289      [06-17-25 @ 16:45]    Creatinine Trend:  SCr 1.64 [06-18 @ 01:44]  SCr 1.60 [06-17 @ 18:00]  SCr 1.62 [06-17 @ 14:05]  SCr 1.48 [06-17 @ 05:40]  SCr 1.36 [06-16 @ 23:27]    Urine Osmolality 416      [06-17-25 @ 16:45]    TSH 7.29      [06-16-25 @ 23:27]

## 2025-06-18 NOTE — PROGRESS NOTE ADULT - ASSESSMENT
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 Potential GI source based on imaging  Overall, Bacteremia, Leukocytosis  - Zosyn 3.375g q 8, monitor CrCl, borderline, would need decreased dose if worsening Cr  - Please repeat BCXs for clear  - Hold on OROPA for now  - If checking paracentesis, would check cells and cultures  - Trend WBC to normal  - Monitor for other sources  - Next steps pending clinical progression    Henry Flores MD  Contact on TEAMS messaging from 9am - 5pm  From 5pm-9am, on weekends, or if no response call [phone]    Antibiotic decision making based on local antibiogram and available recent culture results

## 2025-06-18 NOTE — PROGRESS NOTE ADULT - SUBJECTIVE AND OBJECTIVE BOX
CC: F/U for Bacteremia    Saw/spoke to patient. No fevers, no chills. No new complaints.    Allergies  No Known Allergies    ANTIMICROBIALS:  piperacillin/tazobactam IVPB.. 3.375 every 8 hours    PE:    Vital Signs Last 24 Hrs  T(C): 36.4 (18 Jun 2025 12:00), Max: 37.2 (18 Jun 2025 00:00)  T(F): 97.5 (18 Jun 2025 12:00), Max: 98.9 (18 Jun 2025 00:00)  HR: 96 (18 Jun 2025 15:00) (63 - 102)  BP: 100/75 (18 Jun 2025 15:00) (60/48 - 133/82)  BP(mean): 80 (18 Jun 2025 15:00) (55 - 97)  RR: 24 (18 Jun 2025 15:00) (10 - 25)  SpO2: 100% (18 Jun 2025 15:00) (91% - 100%)    Gen: AOx3, NAD, non-toxic  CV: Nontachycardic  Resp: Breathing comfortably, RA  Abd: Soft, nontender  IV/Skin: No thrombophlebitis    LABS:                        10.2   26.56 )-----------( 168      ( 18 Jun 2025 01:44 )             31.8     06-18    126[L]  |  87[L]  |  35[H]  ----------------------------<  252[H]  4.5   |  24  |  1.51[H]    Ca    6.8[L]      18 Jun 2025 09:51  Phos  4.9     06-18  Mg     1.70     06-18    TPro  4.2[L]  /  Alb  1.7[L]  /  TBili  0.2  /  DBili  x   /  AST  69[H]  /  ALT  20  /  AlkPhos  88  06-18    Urinalysis Basic - ( 18 Jun 2025 09:51 )    Color: x / Appearance: x / SG: x / pH: x  Gluc: 252 mg/dL / Ketone: x  / Bili: x / Urobili: x   Blood: x / Protein: x / Nitrite: x   Leuk Esterase: x / RBC: x / WBC x   Sq Epi: x / Non Sq Epi: x / Bacteria: x    MICROBIOLOGY:    Catheterized Catheterized  06-17-25   No growth  --  --    Blood Blood-Peripheral  06-16-25   Growth in anaerobic bottle: Gram Positive Rods  Direct identification is available within approximately 3-5  hours either by Blood Panel Multiplexed PCR or Direct  MALDI-TOF. Details: [email or web address]  --  Blood Culture PCR    Blood Blood-Peripheral  06-16-25   No growth at 24 hours  --  --    Rapid RVP Result: Paul (06-16 @ 12:16)    RADIOLOGY:    6/16 XR    IMPRESSION:  Status post central line placement.  Bilateral large effusions with underlying atelectasis.

## 2025-06-18 NOTE — PROGRESS NOTE ADULT - ASSESSMENT
GREG PELAYO is a 74y female with PMH colon cancer stage IV and HTN who was admitted for sepsis and hypotension on norepinephrine    PLAN  =====Neurologic=====  Baseline AOx4  -Currently weak, with episodes of restlessness, oriented x4  -s/p precedex gtt for anxiety/restlessness     =====Pulmonary=====  #Pleural Effusions  -CXR 6/16 shows bilateral large effusions with underlying atelectasis  -02 sat >95% on 6L NC    =====Cardiovascular=====  #Hypotension  #Shock  #HFrEF  - likely Septic shock  - requiring pressors, c/w levo and vasopressin and titrate for map >65   - started on stress dose steroids w/ hydrocortisone 50 q6hrs (6/17- )   - continue to hold home med telmisartan-hydrochlorothiazide 80 mg-12.5 mg qd iso septic shock  - TTE 6/17 Paradoxical septal wall motion. LV systolic function mildly decreased, EF 45 to 50%, global LV hypokinesis. mild (grade 1) left ventricular diastolic dysfunction. RV not well visualized. Mild MR and moderate TR. PASP is 45 mmHg, consistent with mild pHTN. Bilateral pleural effusion noted.      =====GI=====  #Abdominal Pain  #Diarrhea  #Ascites  Concern for gastroenteritis vs peritonitis. CT showing changes consistent with prior paracentesis when compared to 6/4/25 scans.  - S/p vanc and cefepime x1 in ED  - C/w empiric Zosyn (6/16-  )  - Send GI PCR,  Stool culture with next BM  - S/p US paracentesis on 6/11 (6550L removed) but patient still complaining of tender abdomen. Plan for diagnostic paracentesis, however family deferred at this time  --> no lab records in system/HIE showing results of testing on para-fluid    #Diet  - Keep NPO for now  - Failed dysphagia screen  - SLP eval - recommends to keep NPO and TF to maintain provide adequate nutrition. (pt noted to be lethargic during exam, delayed swallowing)   - Family deferred NGT placement at this time, reports discussing  NGT with oncologist and based on their conversation would like to hold off for now  - Patient will require adequate nutrition/hydration, plan to reassess and repeat SLP as mental status improves,  - Nutrition consult      =====Renal/=====  #Hyponatremia  #Electrolytes  #IVORY  Baseline SCr 0.68 (6/4/25)  - SCr uptrending -> 1.6, likely prerenal 2/2 hemodynamic instability, IV contrast use, and decreased oral intake  - CT A/P with IV contrast 6/16 showed kidneys/ureters within normal limits  - F/u Renal Ultrasound  - Nephrology consulted, now following  - Indwelling catheter placed, monitor I/O's  - S/p 4.5L IVF, POCUS showed IVC 1.3   - hyponatremia likely iso of malignancy, decreased PO intake vs SIADH?  - Send urine lytes, serum osms, and urine osms  - continue to monitor electrolytes closely, maintain K>4, Phos>3, Mag>2, iCal>1    =====Endocrine=====  #No acute issues  A1c- 5.8%  - TSH 7.29, T3 106    =====Infectious Disease=====  #Septic shock  #Hypotensive and hypothermic  #Leukocytosis  -WBC 13.46 with 10.3% bandemia, hypothermic   -Lactic acid 10.8, downtrending -> 2.3  -Procal >100  -S/p vanc and cefepime x1 in ED  -Blood cultures x 1 with growth of strep species in anaerobic bottle (6/16).   -C/w Empiric Zosyn (6/16-  )  -Follow urine culture  -ID consulted, f/u recs    =====Heme/Onc=====  #DVT Ppx  - Heparin SQ q12h    ====Skin=====  #Extravasation  - Left forearm PIV site- levophed running through PIV with signs of extravasation under ultrasound visualization .Extremity found to be red, mildly edematous, blanchable, pulses intact. patent flow on bedside US. Cap refill present distally,  skin intact without blistering or breakdown.   - Pressor extravasation protocol, S/p phentolamine and Nitropatse applied   - C/w Vascular checks as per protocol    ====Lines=====  Right UA [place]- 6/17  RIJ TLC 6/17  Indwelling Catheter 6/17    =====Ethics=====  FULL CODE GREG PELAYO is a 74y female with PMH colon cancer stage IV and HTN who was admitted for sepsis and hypotension on norepinephrine    PLAN  =====Neurologic=====  Baseline AOx4  - Currently weak, with episodes of restlessness, oriented x4  - s/p precedex gtt for anxiety/restlessness   - ammonia wnls    =====Pulmonary=====  #Pleural Effusions  - CXR 6/16 shows bilateral large effusions with underlying atelectasis  - 02 sat >95% on 6L NC    =====Cardiovascular=====  #Hypotension  #Shock  #HFrEF  - likely Septic shock  - requiring pressors, off vaso 6/18 , c/w levo 0.06  - started on stress dose steroids w/ hydrocortisone 50 q6hrs (6/17- ) tapered tp 50 q8hrs   - continue to hold home med telmisartan-hydrochlorothiazide 80 mg-12.5 mg qd iso septic shock  - TTE 6/17 Paradoxical septal wall motion. LV systolic function mildly decreased, EF 45 to 50%, global LV hypokinesis. mild (grade 1) left ventricular diastolic dysfunction. RV not well visualized. Mild MR and moderate TR. PASP is 45 mmHg, consistent with mild pHTN. Bilateral pleural effusion noted.      =====GI=====  #Abdominal Pain  #Diarrhea  #Ascites  Concern for gastroenteritis vs peritonitis. CT showing gastroenterocolitis and changes consistent with prior paracentesis when compared to 6/4/25 scans.  - S/p vanc and cefepime x1 in ED  - C/w empiric Zosyn (6/16-  )  - no longer having diarrhea, if returns will check for GI PCR,  Stool culture . per heme/on diarrhea may be 2/2 capecitabine  - S/p US paracentesis on 6/11 (6550L removed) but patient still complaining of tender abdomen. Plan for diagnostic paracentesis, however family deferred at this time  --> no lab records in system/HIE showing results of testing on para-fluid    #Diet  - Keep NPO for now  - Failed dysphagia screen  - SLP eval - recommends to keep NPO and TF to maintain provide adequate nutrition. (pt noted to be lethargic during exam, delayed swallowing)   - Family deferred NGT placement at this time, reports discussing NGT with oncologist and based on their conversation would like to hold off for now  - Patient will require adequate nutrition/hydration, plan to reassess and repeat SLP as mental status improves,  - Nutrition consult      =====Renal/=====  #Hyponatremia  #IVORY  Baseline SCr 0.68 (6/4/25)  - SCr uptrending -> 1.5, likely prerenal 2/2 hemodynamic instability, IV contrast use, and decreased oral intake  - CT A/P with IV contrast 6/16 showed kidneys/ureters within normal limits  - F/u Renal Ultrasound  - Nephrology consulted, now following  - Indwelling catheter placed, monitor I/O's  - S/p 4.5L IVF, POCUS showed IVC 1.3   - hyponatremia likely iso of malignancy, decreased PO intake vs SIADH?  - f/up urine lytes, serum osms, and urine osms  - continue to monitor electrolytes closely, maintain K>4, Phos>3, Mag>2, iCal>1  - s/p lasix overnight, re ordered lasix 40 IV x1 today, goal for net negative 1L    =====Endocrine=====  #No acute issues  A1c- 5.8%  - TSH 7.29, T3 106    =====Infectious Disease=====  #Septic shock  #Hypotensive and hypothermic  #Leukocytosis  #Strep Bacteremia  #Gastroenterocolitis   - WBC 13.46 with 10.3% bandemia, hypothermic   - Lactic acid 10.8, downtrending -> 3  - Procal >100  - S/p vanc and cefepime x1 in ED  - Blood cultures x 1 with growth of strep species in anaerobic bottle (6/16) --> check repeat BCx w/ am labs 6/19  - UCx neg  - C/w Empiric Zosyn (6/16-  )  - ID consulted  - CT a/p showed evidence of gastroenterocolitis, bacteremia possibly from GI source. ddx is bacterial peritonitis iso peritoneal mets. family deferring paracentesis for now.     =====Heme/Onc=====  #metastatic adenocarcinoma (CDX2+, TNM stage: T, M1 unknown primary with peritoneal mets)   - recently started dose reduced single agent cabecitapine 1-2 weeks ago  - CT abdomen and pelvis: interval increase in bilateral large pleural effusions. Peritoneal and serosal implants demonstrate mild interval increase in size, however there is significant decrease in attenuation. Findings   suggestive of posttreatment changes. New diffuse mural thickening of the GI tract, suggestive of gastroenterocolitis. Mild interval decrease of large ascites with trace pneumoperitoneum, representing changes from prior paracentesis.  - heme/onc consulted , holding chemotherapy for now and upon discharge  -Patient to follow up with Dr. Daly to further discuss plan of care. Please contact oncology upon discharge.   -Oncology to sign off. Please call with any questions or concerns.     #DVT Ppx  - Heparin SQ q12h    ====Skin=====  #Extravasation  - Left forearm PIV site- levophed running through PIV with signs of extravasation under ultrasound visualization .Extremity found to be red, mildly edematous, blanchable, pulses intact. patent flow on bedside US. Cap refill present distally,  skin intact without blistering or breakdown.   - Pressor extravasation protocol, S/p phentolamine and Nitropatse applied   - C/w Vascular checks as per protocol  - RUE noted to be purple yesterday morning, pulses intact, A line was removed  - f/up RUE VA dueplex    ====Lines=====  Right UA [place]- 6/17-6/18  RIJ TLC 6/17  Indwelling Catheter 6/17    =====Ethics=====  FULL CODE GREG PELAYO is a 74y female with PMH colon cancer stage IV and HTN who was admitted for sepsis and hypotension on norepinephrine    PLAN  =====Neurologic=====  Baseline AOx4  - Currently weak, with episodes of restlessness, oriented x4  - s/p precedex gtt for anxiety/restlessness   - ammonia wnls    =====Pulmonary=====  #Pleural Effusions  - CXR 6/16 shows bilateral large effusions with underlying atelectasis  - 02 sat >95% on 6L NC    =====Cardiovascular=====  #Hypotension  #Shock  #HFrEF  - likely Septic shock  - requiring pressors, off vaso 6/18 , c/w levo 0.06  - started on stress dose steroids w/ hydrocortisone 50 q6hrs (6/17- ) tapered tp 50 q8hrs   - continue to hold home med telmisartan-hydrochlorothiazide 80 mg-12.5 mg qd iso septic shock  - TTE 6/17 Paradoxical septal wall motion. LV systolic function mildly decreased, EF 45 to 50%, global LV hypokinesis. mild (grade 1) left ventricular diastolic dysfunction. RV not well visualized. Mild MR and moderate TR. PASP is 45 mmHg, consistent with mild pHTN. Bilateral pleural effusion noted.      =====GI=====  #Abdominal Pain  #Diarrhea  #Ascites  Concern for gastroenteritis vs peritonitis. CT showing gastroenterocolitis and changes consistent with prior paracentesis when compared to 6/4/25 scans.  - S/p vanc and cefepime x1 in ED  - C/w empiric Zosyn (6/16-  )  - no longer having diarrhea, if returns will check for GI PCR,  Stool culture . per heme/on diarrhea may be 2/2 capecitabine  - S/p US paracentesis on 6/11 (6550L removed) but patient still complaining of tender abdomen. Plan for diagnostic paracentesis, however family deferred at this time  --> no lab records in system/HIE showing results of testing on para-fluid    #Diet  - Keep NPO for now  - Failed dysphagia screen  - SLP eval - recommends to keep NPO and TF to maintain provide adequate nutrition. (pt noted to be lethargic during exam, delayed swallowing)   - Family deferred NGT placement at this time, reports discussing NGT with oncologist and based on their conversation would like to hold off for now  - Patient will require adequate nutrition/hydration, plan to reassess and repeat SLP as mental status improves,  - Nutrition consult      =====Renal/=====  #Hyponatremia  #IVORY  Baseline SCr 0.68 (6/4/25)  - SCr uptrending -> 1.5, likely prerenal 2/2 hemodynamic instability, IV contrast use, and decreased oral intake  - CT A/P with IV contrast 6/16 showed kidneys/ureters within normal limits  - F/u Renal Ultrasound  - Nephrology consulted, now following  - Indwelling catheter placed, monitor I/O's  - S/p 4.5L IVF, POCUS showed IVC 1.3   - hyponatremia likely iso of malignancy, decreased PO intake vs SIADH?  - f/up urine lytes, serum osms, and urine osms  - continue to monitor electrolytes closely, maintain K>4, Phos>3, Mag>2, iCal>1  - s/p lasix overnight, re ordered lasix 40 IV x1 today, goal for net negative 1L    =====Endocrine=====  #No acute issues  A1c- 5.8%  - TSH 7.29, T3 106    =====Infectious Disease=====  #Septic shock  #Hypotensive and hypothermic  #Leukocytosis  #Strep Bacteremia  #Gastroenterocolitis   - WBC 13.46 with 10.3% bandemia, hypothermic   - Lactic acid 10.8, downtrending -> 3  - Procal >100  - S/p vanc and cefepime x1 in ED  - Blood cultures x 1 with growth of strep species in anaerobic bottle (6/16) --> check repeat BCx w/ am labs 6/19  - UCx neg  - C/w Empiric Zosyn (6/16-  )  - ID consulted  - CT a/p showed evidence of gastroenterocolitis, bacteremia possibly from GI source. ddx is bacterial peritonitis iso peritoneal mets. family deferring paracentesis for now.     =====Heme/Onc=====  #metastatic adenocarcinoma (CDX2+, TNM stage: T, M1 unknown primary with peritoneal mets)   - recently started dose reduced single agent cabecitapine 1-2 weeks ago  - CT abdomen and pelvis: interval increase in bilateral large pleural effusions. Peritoneal and serosal implants demonstrate mild interval increase in size, however there is significant decrease in attenuation. Findings   suggestive of posttreatment changes. New diffuse mural thickening of the GI tract, suggestive of gastroenterocolitis. Mild interval decrease of large ascites with trace pneumoperitoneum, representing changes from prior paracentesis.  - heme/onc consulted , holding chemotherapy for now and upon discharge  -Patient to follow up with Dr. Daly to further discuss plan of care. Please contact oncology upon discharge.   -Oncology to sign off. Please call with any questions or concerns.     #DVT Ppx  - Heparin SQ q12h    ====Skin=====  #Extravasation  - Left forearm PIV site- levophed running through PIV with signs of extravasation under ultrasound visualization .Extremity found to be red, mildly edematous, blanchable, pulses intact. patent flow on bedside US. Cap refill present distally,  skin intact without blistering or breakdown.   - Pressor extravasation protocol, S/p phentolamine and Nitropatse applied   - C/w Vascular checks as per protocol  - RUE noted to be purple yesterday morning, pulses intact, A line was removed  - RUE VA duplex 6/1/ : No occlusive arterial disease noted in the right upper extremity    ====Lines=====  Right UA [place]- 6/17-6/18  RIJ TLC 6/17  Indwelling Catheter 6/17    =====Ethics=====  FULL CODE GREG PELAYO is a 74y female with PMH colon cancer stage IV and HTN who was admitted for sepsis and hypotension on norepinephrine    PLAN  =====Neurologic=====  Baseline AOx4  - Currently weak, with episodes of restlessness, oriented x4  - s/p precedex gtt for anxiety/restlessness   - ammonia wnls    =====Pulmonary=====  #Pleural Effusions  - CXR 6/16 shows bilateral large effusions with underlying atelectasis  - 02 sat >95% on 6L NC    =====Cardiovascular=====  #Hypotension  #Shock  #HFrEF  - likely Septic shock  - requiring pressors, off vaso 6/18 , c/w levo 0.06  - started on stress dose steroids w/ hydrocortisone 50 q6hrs (6/17- ) tapered tp 50 q8hrs   - continue to hold home med telmisartan-hydrochlorothiazide 80 mg-12.5 mg qd iso septic shock  - TTE 6/17 Paradoxical septal wall motion. LV systolic function mildly decreased, EF 45 to 50%, global LV hypokinesis. mild (grade 1) left ventricular diastolic dysfunction. RV not well visualized. Mild MR and moderate TR. PASP is 45 mmHg, consistent with mild pHTN. Bilateral pleural effusion noted.      =====GI=====  #Abdominal Pain  #Diarrhea  #Ascites  Concern for gastroenteritis vs peritonitis. CT showing gastroenterocolitis and changes consistent with prior paracentesis when compared to 6/4/25 scans.  - S/p vanc and cefepime x1 in ED  - C/w empiric Zosyn (6/16-  )  - no longer having diarrhea, if returns will check for GI PCR,  Stool culture . per heme/on diarrhea may be 2/2 capecitabine  - S/p US paracentesis on 6/11 (6550L removed) but patient still complaining of tender abdomen. Plan for diagnostic paracentesis, however family deferred at this time  --> no lab records in system/HIE showing results of testing on para-fluid    #Diet  - Keep NPO for now  - Failed dysphagia screen  - SLP eval - recommends to keep NPO and TF to maintain provide adequate nutrition. (pt noted to be lethargic during exam, delayed swallowing)   - Family deferred NGT placement at this time, reports discussing NGT with oncologist and based on their conversation would like to hold off for now  - Patient will require adequate nutrition/hydration, plan to reassess and repeat SLP as mental status improves,  - Nutrition consult      =====Renal/=====  #Hyponatremia  #IVORY  Baseline SCr 0.68 (6/4/25)  - SCr uptrending -> 1.5, likely prerenal 2/2 hemodynamic instability, IV contrast use, and decreased oral intake  - CT A/P with IV contrast 6/16 showed kidneys/ureters within normal limits  - F/u Renal Ultrasound  - Nephrology consulted, now following  - Indwelling catheter placed, monitor I/O's  - S/p 4.5L IVF, POCUS showed IVC 1.3   - hyponatremia likely iso of malignancy, decreased PO intake vs SIADH?  - f/up urine lytes, serum osms, and urine osms  - continue to monitor electrolytes closely, maintain K>4, Phos>3, Mag>2, iCal>1  - s/p intermittantly lasix for goal net negative 1L , last dose 40mg IV x2 6/18    =====Endocrine=====  #No acute issues  A1c- 5.8%  - TSH 7.29, T3 106    =====Infectious Disease=====  #Septic shock  #Hypotensive and hypothermic  #Leukocytosis  #Strep Bacteremia  #Gastroenterocolitis   - WBC 13.46 with 10.3% bandemia, hypothermic   - Lactic acid 10.8, downtrending -> 3  - Procal >100  - S/p vanc and cefepime x1 in ED  - Blood cultures x 1 with growth of strep species in anaerobic bottle (6/16) --> check repeat BCx w/ am labs 6/19  - UCx neg  - C/w Empiric Zosyn (6/16-  )  - ID consulted  - CT a/p showed evidence of gastroenterocolitis, bacteremia possibly from GI source. ddx is bacterial peritonitis iso peritoneal mets. family deferring paracentesis for now.     =====Heme/Onc=====  #metastatic adenocarcinoma (CDX2+, TNM stage: T, M1 unknown primary with peritoneal mets)   - recently started dose reduced single agent cabecitapine 1-2 weeks ago  - CT abdomen and pelvis: interval increase in bilateral large pleural effusions. Peritoneal and serosal implants demonstrate mild interval increase in size, however there is significant decrease in attenuation. Findings   suggestive of posttreatment changes. New diffuse mural thickening of the GI tract, suggestive of gastroenterocolitis. Mild interval decrease of large ascites with trace pneumoperitoneum, representing changes from prior paracentesis.  - heme/onc consulted , holding chemotherapy for now and upon discharge  -Patient to follow up with Dr. Daly to further discuss plan of care. Please contact oncology upon discharge.   -Oncology to sign off. Please call with any questions or concerns.     #DVT Ppx  - Heparin SQ q12h    ====Skin=====  #Extravasation  - Left forearm PIV site- levophed running through PIV with signs of extravasation under ultrasound visualization .Extremity found to be red, mildly edematous, blanchable, pulses intact. patent flow on bedside US. Cap refill present distally,  skin intact without blistering or breakdown.   - Pressor extravasation protocol, S/p phentolamine and Nitropatse applied   - C/w Vascular checks as per protocol  - RUE noted to be purple yesterday morning, pulses intact, A line was removed  - RUE VA duplex 6/1/ : No occlusive arterial disease noted in the right upper extremity    ====Lines=====  Right UA [place]- 6/17-6/18  RIJ TLC 6/17  Indwelling Catheter 6/17    =====Ethics=====  FULL CODE GREG PELAYO is a 74y female with PMH colon cancer stage IV and HTN who was admitted for sepsis and hypotension on norepinephrine    PLAN  =====Neurologic=====  Baseline AOx4  - Currently weak, with episodes of restlessness, oriented x4  - s/p precedex gtt for anxiety/restlessness   - ammonia wnls    =====Pulmonary=====  #Pleural Effusions  - CXR 6/16 shows bilateral large effusions with underlying atelectasis  - 02 sat >95% on 6L NC    =====Cardiovascular=====  #Hypotension  #Shock  #HFrEF  - likely Septic shock  - requiring pressors, off vaso 6/18 , c/w levo 0.06  - started on stress dose steroids w/ hydrocortisone 50 q6hrs (6/17- ) tapered tp 50 q8hrs   - continue to hold home med telmisartan-hydrochlorothiazide 80 mg-12.5 mg qd iso septic shock  - TTE 6/17 Paradoxical septal wall motion. LV systolic function mildly decreased, EF 45 to 50%, global LV hypokinesis. mild (grade 1) left ventricular diastolic dysfunction. RV not well visualized. Mild MR and moderate TR. PASP is 45 mmHg, consistent with mild pHTN. Bilateral pleural effusion noted.      =====GI=====  #Abdominal Pain  #Diarrhea  #Ascites  Concern for gastroenteritis vs peritonitis. CT showing gastroenterocolitis and changes consistent with prior paracentesis when compared to 6/4/25 scans.  - S/p vanc and cefepime x1 in ED  - C/w empiric Zosyn (6/16-  )  - no longer having diarrhea, if returns will check for GI PCR,  Stool culture . per heme/on diarrhea may be 2/2 capecitabine  - S/p US paracentesis on 6/11 (6550L removed) but patient still complaining of tender abdomen. Plan for diagnostic paracentesis, however family deferred at this time  --> no lab records in system/HIE showing results of testing on para-fluid    #Diet  - Keep NPO for now  - Failed dysphagia screen  - SLP eval - recommends to keep NPO and TF to maintain provide adequate nutrition. (pt noted to be lethargic during exam, delayed swallowing)   - Family deferred NGT placement at this time, reports discussing NGT with oncologist and based on their conversation would like to hold off for now  - Patient will require adequate nutrition/hydration, plan to reassess and repeat SLP as mental status improves,  - Nutrition consult      =====Renal/=====  #Hyponatremia  #IVORY  Baseline SCr 0.68 (6/4/25)  - SCr uptrending -> 1.5, likely prerenal 2/2 hemodynamic instability, IV contrast use, and decreased oral intake  - CT A/P with IV contrast 6/16 showed kidneys/ureters within normal limits  - F/u Renal Ultrasound  - Nephrology consulted, now following  - Indwelling catheter placed, monitor I/O's  - S/p 4.5L IVF, POCUS showed IVC 1.3   - hyponatremia likely iso of malignancy, decreased PO intake vs SIADH?  - f/up urine lytes, serum osms, and urine osms  - continue to monitor electrolytes closely, maintain K>4, Phos>3, Mag>2, iCal>1  - s/p intermittently lasix for goal net negative 1L , last dose 40mg IV x2 6/18    =====Endocrine=====  #No acute issues  A1c- 5.8%  - TSH 7.29, T3 106    =====Infectious Disease=====  #Septic shock  #Hypotensive and hypothermic  #Leukocytosis  #Strep Bacteremia  #Gastroenterocolitis   - WBC 13.46 with 10.3% bandemia, hypothermic   - Lactic acid 10.8, downtrending -> 3  - Procal >100  - S/p vanc and cefepime x1 in ED  - Blood cultures x 1 with growth of strep species in anaerobic bottle (6/16) --> check repeat BCx w/ am labs 6/19  - UCx neg  - C/w Empiric Zosyn (6/16-  )  - ID consulted  - CT a/p showed evidence of gastroenterocolitis, bacteremia possibly from GI source. ddx is bacterial peritonitis iso peritoneal mets. family deferring paracentesis for now.     =====Heme/Onc=====  #metastatic adenocarcinoma (CDX2+, TNM stage: T, M1 unknown primary with peritoneal mets)   - recently started dose reduced single agent cabecitapine 1-2 weeks ago  - CT abdomen and pelvis: interval increase in bilateral large pleural effusions. Peritoneal and serosal implants demonstrate mild interval increase in size, however there is significant decrease in attenuation. Findings   suggestive of posttreatment changes. New diffuse mural thickening of the GI tract, suggestive of gastroenterocolitis. Mild interval decrease of large ascites with trace pneumoperitoneum, representing changes from prior paracentesis.  - heme/onc consulted , holding chemotherapy for now and upon discharge  -Patient to follow up with Dr. Daly to further discuss plan of care. Please contact oncology upon discharge.   -Oncology to sign off. Please call with any questions or concerns.     #DVT Ppx  - Heparin SQ q12h    ====Skin=====  #Extravasation  - Left forearm PIV site- levophed running through PIV with signs of extravasation under ultrasound visualization .Extremity found to be red, mildly edematous, blanchable, pulses intact. patent flow on bedside US. Cap refill present distally,  skin intact without blistering or breakdown.   - Pressor extravasation protocol, S/p phentolamine and Nitropatse applied   - C/w Vascular checks as per protocol  - RUE noted to be purple yesterday morning, pulses intact, A line was removed  - RUE VA duplex 6/1/ : No occlusive arterial disease noted in the right upper extremity    ====Lines=====  Right UA Carrol- 6/17-6/18  RIJ TLC 6/17  Indwelling Catheter 6/17    =====Ethics=====  FULL CODE GREG PELAYO is a 74y female with PMH colon cancer stage IV and HTN who was admitted for sepsis and hypotension on norepinephrine    PLAN  =====Neurologic=====  Baseline AOx4  - Currently weak, with episodes of restlessness, oriented x4  - s/p precedex gtt for anxiety/restlessness   - ammonia wnls    =====Pulmonary=====  #Pleural Effusions  - CXR 6/16 shows bilateral large effusions with underlying atelectasis  - 02 sat >95% on 6L NC    =====Cardiovascular=====  #Hypotension  #Shock  #HFrEF  - likely Septic shock  - requiring pressors, off vaso 6/18 , c/w levo 0.06  - started on stress dose steroids w/ hydrocortisone 50 q6hrs (6/17- ) tapered tp 50 q8hrs   - continue to hold home med telmisartan-hydrochlorothiazide 80 mg-12.5 mg qd iso septic shock  - TTE 6/17 Paradoxical septal wall motion. LV systolic function mildly decreased, EF 45 to 50%, global LV hypokinesis. mild (grade 1) left ventricular diastolic dysfunction. RV not well visualized. Mild MR and moderate TR. PASP is 45 mmHg, consistent with mild pHTN. Bilateral pleural effusion noted.      =====GI=====  #Abdominal Pain  #Diarrhea  #Ascites  Concern for gastroenteritis vs peritonitis. CT showing gastroenterocolitis and changes consistent with prior paracentesis when compared to 6/4/25 scans.  - S/p vanc and cefepime x1 in ED  - C/w empiric Zosyn (6/16-  )  - no longer having diarrhea, if returns will check for GI PCR,  Stool culture . per heme/on diarrhea may be 2/2 capecitabine  - S/p US paracentesis on 6/11 (6550L removed) but patient still complaining of tender abdomen. Plan for diagnostic paracentesis, however family deferred at this time  --> no lab records in system/HIE showing results of testing on para-fluid    #Diet  - Keep NPO for now  - Failed dysphagia screen  - SLP eval - recommends to keep NPO and TF to maintain provide adequate nutrition. (pt noted to be lethargic during exam, delayed swallowing)   - Family deferred NGT placement at this time, reports discussing NGT with oncologist and based on their conversation would like to hold off for now  - Patient will require adequate nutrition/hydration, plan to reassess and repeat SLP as mental status improves,  - Nutrition consult      =====Renal/=====  #Hyponatremia  #IVORY  Baseline SCr 0.68 (6/4/25)  - SCr uptrending -> 1.5, likely prerenal 2/2 hemodynamic instability, IV contrast use, and decreased oral intake  - CT A/P with IV contrast 6/16 showed kidneys/ureters within normal limits  - F/u Renal Ultrasound  - Nephrology consulted, now following  - Indwelling catheter placed, monitor I/O's  - S/p 4.5L IVF, POCUS showed IVC 1.3   - hyponatremia likely iso of malignancy, decreased PO intake vs SIADH?  - f/up urine lytes, serum osms 289, and urine osms 416  - s/p intermittently lasix for goal net negative 1L , last dose 40mg IV x2 6/18  - >3043  - trend BMP, VBG w/ lactate, CK Q12hrs    =====Endocrine=====  #Prediabetes  A1c- 5.8%  - monitor FS q6h while NPO and on stress dose steroids    #Abnormal Thyroid Function Tests  #Euthyroid Sick Syndrome  - TSH 7.29, Free thyroxine 0.4, T3 106  - endo consulted, abnormal TFTs likely euthyroid sick syndrome iso critical illness, no recommending treatment at this   - check repeating thyroid function tests  in 4 weeks    =====Infectious Disease=====  #Septic shock  #Hypotensive and hypothermic  #Leukocytosis  #Strep Bacteremia  #Gastroenterocolitis   - WBC 13.46 with 10.3% bandemia, hypothermic   - Lactic acid 10.8, downtrending -> 3  - Procal >100  - S/p vanc and cefepime x1 in ED  - Blood cultures x 1 with growth of strep species in anaerobic bottle (6/16) --> check repeat BCx w/ am labs 6/19  - UCx neg  - C/w Empiric Zosyn (6/16-  )  - ID consulted  - CT a/p showed evidence of gastroenterocolitis, bacteremia possibly from GI source. ddx is bacterial peritonitis iso peritoneal mets. family deferring paracentesis for now.     =====Heme/Onc=====  #metastatic adenocarcinoma (CDX2+, TNM stage: T, M1 unknown primary with peritoneal mets)   - recently started dose reduced single agent cabecitapine 1-2 weeks ago  - CT abdomen and pelvis: interval increase in bilateral large pleural effusions. Peritoneal and serosal implants demonstrate mild interval increase in size, however there is significant decrease in attenuation. Findings   suggestive of posttreatment changes. New diffuse mural thickening of the GI tract, suggestive of gastroenterocolitis. Mild interval decrease of large ascites with trace pneumoperitoneum, representing changes from prior paracentesis.  - heme/onc consulted , holding chemotherapy for now and upon discharge  -Patient to follow up with Dr. Daly to further discuss plan of care. Please contact oncology upon discharge.   -Oncology to sign off. Please call with any questions or concerns.     #DVT Ppx  - SQ Heparin     ====Skin=====  #Extravasation  - Left forearm PIV site- levophed running through PIV with signs of extravasation under ultrasound visualization .Extremity found to be red, mildly edematous, blanchable, pulses intact. patent flow on bedside US. Cap refill present distally,  skin intact without blistering or breakdown.   - Pressor extravasation protocol, S/p phentolamine and Nitropatse applied   - C/w Vascular checks as per protocol  - RUE noted to be purple yesterday morning, pulses intact, A line was removed  - RUE VA duplex 6/1/ : No occlusive arterial disease noted in the right upper extremity    ====Lines=====  Right UA Carrol- 6/17-6/18  RIJ TLC 6/17  Indwelling Catheter 6/17    =====Ethics=====  FULL CODE

## 2025-06-18 NOTE — CONSULT NOTE ADULT - SUBJECTIVE AND OBJECTIVE BOX
Patient is a 74y old  Female who presents with a chief complaint of Sepsis (18 Jun 2025 09:57)    HPI:  74-year-old female past medical history of colon cancer stage IV brought in by EMS from home status post wellness check.  Per EMS daughter had not heard from her mother in 2 days and called for wellness check when EMS arrived she was found surrounded by feces on her bed.  Patient found to be hypotensive and appeared very drowsy.  And route patient received IV fluids about 200 cc.  At the time of my assessment patient is not endorsing any pain.  She does state she had a stomachache last night and multiple episodes of diarrhea.  She denies any falls or trauma to her head.  Patient appears very weak.  Rectal temp taken at bedside 92.4.  POCUS performed at bedside showed no obvious effusions.  IVC collapsible.  No B-lines noted on pulmonary exam. Of note, patient had  recent therapeutic paracentesis on 6/11 with removal of 650 cc of fluid.    Notable for temp of 92.4F, MAP 58 which improved to 80s-90s after fluids but dropped again. On 4L NC.   WBC of 13.46 with bands, Cr 1.18 (up from 0.68 on 6/4), Albumin 1.7 (down from 3.1 on 6/4), VBG with pH 7.19, pCO2 32, lactate 10.8->8.8,   In the ED received, 3L LR, 1g Vanc, 1g Cefepime,   CT a/p with interval increase in bilateral large pleural effusions, new diffuse mural thickening of the GI tract, suggestive of gastroenterocolitis, mild interval decrease of large ascites with trace pneumoperitoneum, representing changes from prior paracentesis. (16 Jun 2025 18:47)    Patient currently in MICU on multiple pressors, treating for septic shock with stress dose steroids and abx.  Endocrinology consulted due to abnormal TFT, patient to found have TSH 7.29, low free T4 0.4 and normal T3 106.  History obtained largely from chart review due to patient's clinical status- altered mentation, unable to answer questions.    PAST MEDICAL & SURGICAL HISTORY:  Hypertension  Colon cancer stage IV    REVIEW OF SYSTEMS:  Unable to obtain due to patient's clinical status      No Known Allergies    MEDICATIONS  (STANDING):  chlorhexidine 2% Cloths 1 Application(s) Topical <User Schedule>  dexMEDEtomidine Infusion 0.1 MICROgram(s)/kG/Hr (1.14 mL/Hr) IV Continuous <Continuous>  heparin   Injectable 5000 Unit(s) SubCutaneous every 12 hours  hydrocortisone sodium succinate Injectable 50 milliGRAM(s) IV Push every 8 hours  mupirocin 2% Nasal 1 Application(s) Both Nostrils two times a day  nitroglycerin    2% Ointment 2 Inch(s) Transdermal once  norepinephrine Infusion 0.19 MICROgram(s)/kG/Min (7.86 mL/Hr) IV Continuous <Continuous>  pantoprazole  Injectable 40 milliGRAM(s) IV Push daily  piperacillin/tazobactam IVPB.. 3.375 Gram(s) IV Intermittent every 8 hours  vasopressin Infusion 0.04 Unit(s)/Min (6 mL/Hr) IV Continuous <Continuous>    MEDICATIONS  (PRN):        OBJECTIVE:     Vital Signs Last 24 Hrs  T(C): 36.4 (18 Jun 2025 12:00), Max: 37.2 (18 Jun 2025 00:00)  T(F): 97.5 (18 Jun 2025 12:00), Max: 98.9 (18 Jun 2025 00:00)  HR: 98 (18 Jun 2025 14:00) (63 - 102)  BP: 102/76 (18 Jun 2025 14:00) (60/48 - 133/82)  BP(mean): 85 (18 Jun 2025 14:00) (55 - 97)  RR: 25 (18 Jun 2025 14:00) (10 - 25)  SpO2: 100% (18 Jun 2025 14:00) (91% - 100%)    Parameters below as of 18 Jun 2025 14:00  Patient On (Oxygen Delivery Method): nasal cannula w/ humidification  O2 Flow (L/min): 6      PHYSICAL EXAM:  ***      I&O's Detail    17 Jun 2025 07:01  -  18 Jun 2025 07:00  --------------------------------------------------------  IN:    Dexmedetomidine: 190.4 mL    IV PiggyBack: 300 mL    Lactated Ringers: 375 mL    Norepinephrine: 138.5 mL    Sodium Bicarbonate: 3000 mL    Vasopressin: 144 mL  Total IN: 4147.9 mL    OUT:    Indwelling Catheter - Urethral (mL): 685 mL    Voided (mL): 0 mL  Total OUT: 685 mL    Total NET: 3462.9 mL      18 Jun 2025 07:01  -  18 Jun 2025 14:31  --------------------------------------------------------  IN:    IV PiggyBack: 100 mL    Norepinephrine: 23.3 mL    Norepinephrine: 15.8 mL    Sodium Bicarbonate: 300 mL    Vasopressin: 12 mL  Total IN: 451.1 mL    OUT:    Indwelling Catheter - Urethral (mL): 275 mL  Total OUT: 275 mL    Total NET: 176.1 mL          LABS:                        10.2   26.56 )-----------( 168      ( 18 Jun 2025 01:44 )             31.8     Hgb Trend: 10.2<--, 10.9<--, 13.8<--, 13.8<--  06-18    126[L]  |  87[L]  |  35[H]  ----------------------------<  252[H]  4.5   |  24  |  1.51[H]    Ca    6.8[L]      18 Jun 2025 09:51  Phos  4.9     06-18  Mg     1.70     06-18    TPro  4.2[L]  /  Alb  1.7[L]  /  TBili  0.2  /  DBili  x   /  AST  69[H]  /  ALT  20  /  AlkPhos  88  06-18    Creatinine Trend: 1.51<--, 1.64<--, 1.60<--, 1.62<--, 1.48<--, 1.36<--  PT/INR - ( 18 Jun 2025 01:44 )   PT: 15.8 sec;   INR: 1.37 ratio         PTT - ( 18 Jun 2025 01:44 )  PTT:37.7 sec  Urinalysis Basic - ( 18 Jun 2025 09:51 )    Color: x / Appearance: x / SG: x / pH: x  Gluc: 252 mg/dL / Ketone: x  / Bili: x / Urobili: x   Blood: x / Protein: x / Nitrite: x   Leuk Esterase: x / RBC: x / WBC x   Sq Epi: x / Non Sq Epi: x / Bacteria: x        Assessment/Plan:    ***    Thank you for the consult. Will continue to monitor. Contact via pager or [employer] Teams during business hours. For follow up questions, discharge recommendations, or new consults please call answering service at [phone] (weekdays), [phone] (nights/weekends). For nonurgent matters, please email [email or web address] or [email or web address]. Patient can follow up at discharge with [facility] by calling [phone] to make an appointment or [address] [facility] #[phone] or [facility] [place]: [address] [place] [address]; Ph # [phone].    Lilly Retana D.O.  Department of Endocrinology, Diabetes and metabolism   MS Teams   Patient is a 74y old  Female who presents with a chief complaint of Sepsis (18 Jun 2025 09:57)    HPI:  74-year-old female past medical history of colon cancer stage IV brought in by EMS from home status post wellness check.  Per EMS daughter had not heard from her mother in 2 days and called for wellness check when EMS arrived she was found surrounded by feces on her bed.  Patient found to be hypotensive and appeared very drowsy.  And route patient received IV fluids about 200 cc.  At the time of my assessment patient is not endorsing any pain.  She does state she had a stomachache last night and multiple episodes of diarrhea.  She denies any falls or trauma to her head.  Patient appears very weak.  Rectal temp taken at bedside 92.4.  POCUS performed at bedside showed no obvious effusions.  IVC collapsible.  No B-lines noted on pulmonary exam. Of note, patient had  recent therapeutic paracentesis on 6/11 with removal of 650 cc of fluid.    Notable for temp of 92.4F, MAP 58 which improved to 80s-90s after fluids but dropped again. On 4L NC.   WBC of 13.46 with bands, Cr 1.18 (up from 0.68 on 6/4), Albumin 1.7 (down from 3.1 on 6/4), VBG with pH 7.19, pCO2 32, lactate 10.8->8.8,   In the ED received, 3L LR, 1g Vanc, 1g Cefepime,   CT a/p with interval increase in bilateral large pleural effusions, new diffuse mural thickening of the GI tract, suggestive of gastroenterocolitis, mild interval decrease of large ascites with trace pneumoperitoneum, representing changes from prior paracentesis. (16 Jun 2025 18:47)    Patient currently in MICU on multiple pressors, treating for septic shock with stress dose steroids and abx.  Endocrinology consulted due to abnormal TFT, patient to found have TSH 7.29, low free T4 0.4 and normal T3 106.  She denies personal hx of thyroid problems or fhx of thyroid disease.  Per HIE:  She had normal TSH 1.79 on 4/29/25, Normal TSH 2.18 and free T4 1.0 on 2/29/24    PAST MEDICAL & SURGICAL HISTORY:  Hypertension  Colon cancer stage IV    REVIEW OF SYSTEMS:  Unable to obtain due to patient's clinical status      No Known Allergies    MEDICATIONS  (STANDING):  chlorhexidine 2% Cloths 1 Application(s) Topical <User Schedule>  dexMEDEtomidine Infusion 0.1 MICROgram(s)/kG/Hr (1.14 mL/Hr) IV Continuous <Continuous>  heparin   Injectable 5000 Unit(s) SubCutaneous every 12 hours  hydrocortisone sodium succinate Injectable 50 milliGRAM(s) IV Push every 8 hours  mupirocin 2% Nasal 1 Application(s) Both Nostrils two times a day  nitroglycerin    2% Ointment 2 Inch(s) Transdermal once  norepinephrine Infusion 0.19 MICROgram(s)/kG/Min (7.86 mL/Hr) IV Continuous <Continuous>  pantoprazole  Injectable 40 milliGRAM(s) IV Push daily  piperacillin/tazobactam IVPB.. 3.375 Gram(s) IV Intermittent every 8 hours  vasopressin Infusion 0.04 Unit(s)/Min (6 mL/Hr) IV Continuous <Continuous>    MEDICATIONS  (PRN):        OBJECTIVE:     Vital Signs Last 24 Hrs  T(C): 36.4 (18 Jun 2025 12:00), Max: 37.2 (18 Jun 2025 00:00)  T(F): 97.5 (18 Jun 2025 12:00), Max: 98.9 (18 Jun 2025 00:00)  HR: 98 (18 Jun 2025 14:00) (63 - 102)  BP: 102/76 (18 Jun 2025 14:00) (60/48 - 133/82)  BP(mean): 85 (18 Jun 2025 14:00) (55 - 97)  RR: 25 (18 Jun 2025 14:00) (10 - 25)  SpO2: 100% (18 Jun 2025 14:00) (91% - 100%)    Parameters below as of 18 Jun 2025 14:00  Patient On (Oxygen Delivery Method): nasal cannula w/ humidification  O2 Flow (L/min): 6      PHYSICAL EXAM:  General: thin, no acute distress, ill appearing  HEENT: normocephalic, atraumatic head, dry mucous membranes, poor dentition  Cardiac: RRR  Respiratory: no respiratory distress, on nasal cannula  GI: nondistended  Extremities: no edema  MSK: no gross deformities  Skin: warm and dry  Neuro: alert, answering questions appropriately  Psych: reactive affect       I&O's Detail    17 Jun 2025 07:01  -  18 Jun 2025 07:00  --------------------------------------------------------  IN:    Dexmedetomidine: 190.4 mL    IV PiggyBack: 300 mL    Lactated Ringers: 375 mL    Norepinephrine: 138.5 mL    Sodium Bicarbonate: 3000 mL    Vasopressin: 144 mL  Total IN: 4147.9 mL    OUT:    Indwelling Catheter - Urethral (mL): 685 mL    Voided (mL): 0 mL  Total OUT: 685 mL    Total NET: 3462.9 mL      18 Jun 2025 07:01  -  18 Jun 2025 14:31  --------------------------------------------------------  IN:    IV PiggyBack: 100 mL    Norepinephrine: 23.3 mL    Norepinephrine: 15.8 mL    Sodium Bicarbonate: 300 mL    Vasopressin: 12 mL  Total IN: 451.1 mL    OUT:    Indwelling Catheter - Urethral (mL): 275 mL  Total OUT: 275 mL    Total NET: 176.1 mL          LABS:                        10.2   26.56 )-----------( 168      ( 18 Jun 2025 01:44 )             31.8     Hgb Trend: 10.2<--, 10.9<--, 13.8<--, 13.8<--  06-18    126[L]  |  87[L]  |  35[H]  ----------------------------<  252[H]  4.5   |  24  |  1.51[H]    Ca    6.8[L]      18 Jun 2025 09:51  Phos  4.9     06-18  Mg     1.70     06-18    TPro  4.2[L]  /  Alb  1.7[L]  /  TBili  0.2  /  DBili  x   /  AST  69[H]  /  ALT  20  /  AlkPhos  88  06-18    Creatinine Trend: 1.51<--, 1.64<--, 1.60<--, 1.62<--, 1.48<--, 1.36<--  PT/INR - ( 18 Jun 2025 01:44 )   PT: 15.8 sec;   INR: 1.37 ratio         PTT - ( 18 Jun 2025 01:44 )  PTT:37.7 sec  Urinalysis Basic - ( 18 Jun 2025 09:51 )    Color: x / Appearance: x / SG: x / pH: x  Gluc: 252 mg/dL / Ketone: x  / Bili: x / Urobili: x   Blood: x / Protein: x / Nitrite: x   Leuk Esterase: x / RBC: x / WBC x   Sq Epi: x / Non Sq Epi: x / Bacteria: x        Assessment/Plan:    74-year-old female past medical history of colon cancer stage IV presenting with weakness and diarrhea, admitted to MICU with septic shock in setting of Strep bacteremia in setting of gastroenterocolitis  Endocrinology consulted due to abnormal TFT, patient to found have TSH 7.29, low free T4 0.4 and normal T3 106.    #Abnormal TFTs  Patient's pattern of thyroid function tests are most consistent with non-thyroidal illness given overall clinical picture as patient admitted with septic shock and is critically ill. Non-thyroidal illness describes abnormalities in thyroid function tests that occur in patients with acute or chronic systemic illnesses not primarily involving the thyroid gland. Crucially, these abnormalities do not represent true thyroid disease and generally don't require thyroid hormone treatment. Treating SES can actually be harmful.    Lab Findings in Sick Euthyroid Syndrome:  Normal or Low Free T4 (Thyroxine): Free T4 may be normal initially but can decrease as illness severity increases. This can also be due to decreased conversion of T3 to T4 and changes in binding proteins.  Normal or Low TSH (Thyroid Stimulating Hormone): TSH can be low, normal, or sometimes mildly elevated. The normal or low TSH in the setting of low T3 and T4 indicates that the hypothalamic-pituitary-thyroid axis is suppressed in SES.    Transient Nature of acute illness : The thyroid hormone abnormalities in SES are usually transient and resolve as the underlying illness improves. Treating with thyroid hormone is unnecessary and potentially harmful.  Risk of Thyrotoxicosis: Administering exogenous thyroid hormone to a patient with SES can precipitate thyrotoxicosis (excess thyroid hormone) once the underlying illness resolves and normal thyroid function returns.  Interference with Recovery: Thyroid hormone plays a role in metabolism and energy expenditure. Suppressing thyroid function during illness may be a protective mechanism. Artificially raising thyroid hormone levels could interfere with this process and potentially worsen the patient's condition.    Recommend against treatment with levothyroxine as described above.  Recommend repeating thyroid function tests outpatient in 4 weeks.    #Stress hyperglycemia  #Prediabetes  A1c 5.8%  - monitor POC glc q6h while NPO  - recommend low dose correction scale q6h while NPO    Thank you for the consult. Will continue to monitor. Contact via pager or [employer] Teams during business hours. For follow up questions, discharge recommendations, or new consults please call answering service at [phone] (weekdays), [phone] (nights/weekends). For nonurgent matters, please email [email or web address] or [email or web address]. Patient can follow up at discharge with [facility] by calling [phone] to make an appointment or [address] Ambulatory End[facility] Clinic #[phone] or Endocrine Clinic at [facility] [place]: [address] [place] [address]; Ph # [phone].    Lilly Retana D.O.  Department of Endocrinology, Diabetes and metabolism   MS Teams

## 2025-06-18 NOTE — PROGRESS NOTE ADULT - NUTRITIONAL ASSESSMENT
This patient has been assessed with a concern for Malnutrition and has been determined to have a diagnosis/diagnoses of Severe protein-calorie malnutrition and Underweight (BMI < 19).    This patient is being managed with:   Diet NPO-  Except Medications     Special Instructions for Nursing:  Except Medications  Entered: Jun 16 2025  5:55PM

## 2025-06-19 NOTE — PROGRESS NOTE ADULT - PROBLEM SELECTOR PLAN 1
Pt. with IVORY in the setting of hemodynamic instability, IV contrast use, and decreased oral intake. On review of [facility], Scr 0.68 on 6/4/25. Last UA showed turbid urine with proteinuria and leuk esterase (5/9/25). CT A/P with IV contrast showed kidneys/ureters within normal limits (6/16/25).    Scr elevated at 1.73 today. Maintain brandt catheter. Initiated on bumex gtt for oliguria.  cc in the last 24 hours. Recommend GOC discussion. Maintain bumex drip. Currently, no plan for RRT. Avoid any potential nephrotoxins when possible and dose medications per eGFR.
Pt. with IVORY in the setting of hemodynamic instability, IV contrast use, and decreased oral intake. [facility], Scr 0.68 on 6/4/25. Last UA showed turbid urine with proteinuria and leuk esterase (5/9/25). CT A/P with IV contrast showed kidneys/ureters within normal limits (6/16/25).    Scr elevated/stable at 1.64 today. Maintain brandt catheter. Monitor labs and urine output. Currently, no plan for RRT. Avoid any potential nephrotoxins when possible and dose medications per eGFR.

## 2025-06-19 NOTE — PROGRESS NOTE ADULT - SUBJECTIVE AND OBJECTIVE BOX
[facility] Division of Kidney Diseases & Hypertension  FOLLOW UP NOTE  [phone]--------------------------------------------------------------------------------  Chief Complaint: IVORY    24 hour events/subjective: Pt. seen and examined earlier today with sons bedside. Still on supplemental O2. More alert and awake today. States she feels okay.     PAST HISTORY  --------------------------------------------------------------------------------  No significant changes to PMH, PSH, FHx, SHx, unless otherwise noted    ALLERGIES & MEDICATIONS  --------------------------------------------------------------------------------  Allergies    No Known Allergies    Intolerances    Standing Inpatient Medications  argatroban Infusion 0.3 MICROgram(s)/kG/Min IV Continuous <Continuous>  bumetanide Infusion 4 mG/Hr IV Continuous <Continuous>  calcium gluconate IVPB 2 Gram(s) IV Intermittent once  chlorhexidine 2% Cloths 1 Application(s) Topical <User Schedule>  hydrocortisone sodium succinate Injectable 50 milliGRAM(s) IV Push every 8 hours  lidocaine   4% Patch 1 Patch Transdermal once  mupirocin 2% Nasal 1 Application(s) Both Nostrils two times a day  nitroglycerin    2% Ointment 2 Inch(s) Transdermal once  norepinephrine Infusion 0.4 MICROgram(s)/kG/Min IV Continuous <Continuous>  pantoprazole  Injectable 40 milliGRAM(s) IV Push daily  piperacillin/tazobactam IVPB.. 3.375 Gram(s) IV Intermittent every 8 hours  vasopressin Infusion 0.04 Unit(s)/Min IV Continuous <Continuous>    PRN Inpatient Medications    REVIEW OF SYSTEMS  --------------------------------------------------------------------------------  Limited ROS as above.     VITALS/PHYSICAL EXAM  --------------------------------------------------------------------------------  T(C): 37.1 (06-19-25 @ 12:00), Max: 37.8 (06-18-25 @ 20:00)  HR: 116 (06-19-25 @ 15:00) (87 - 127)  BP: 96/60 (06-19-25 @ 10:00) (73/56 - 115/79)  RR: 23 (06-19-25 @ 15:00) (18 - 38)  SpO2: 100% (06-19-25 @ 15:00) (93% - 100%)  Wt(kg): --    06-18-25 @ 07:01  -  06-19-25 @ 07:00  --------------------------------------------------------  IN: 1100.4 mL / OUT: 930 mL / NET: 170.4 mL    06-19-25 @ 07:01  -  06-19-25 @ 15:26  --------------------------------------------------------  IN: 216 mL / OUT: 455 mL / NET: -239 mL    Physical Exam:  Gen: ill appearing  HEENT: slightly dry mucous membranes  Pulm: Decreased breath sounds at bases, on supplemental O2  CV: S1S2  Abd: Soft, +BS   Ext: +B/l LE edema B/L  Neuro: Awake  Skin: Warm and dry  Vascular access: peripheral IVs, R IJ central line    LABS/STUDIES  --------------------------------------------------------------------------------              10.2   29.01 >-----------<  144      [06-19-25 @ 10:10]              30.5     130  |  89  |  41  ----------------------------<  196      [06-19-25 @ 10:10]  4.4   |  21  |  1.70        Ca     7.2     [06-19-25 @ 10:10]      Mg     1.90     [06-19-25 @ 10:10]      Phos  5.8     [06-19-25 @ 10:10]    TPro  4.9  /  Alb  2.2  /  TBili  0.5  /  DBili  x   /  AST  160  /  ALT  47  /  AlkPhos  128  [06-19-25 @ 10:10]    PT/INR: PT 16.2 , INR 1.40       [06-19-25 @ 10:10]  PTT: 50.3       [06-19-25 @ 14:30]    Serum Osmolality 289      [06-17-25 @ 16:45]    Creatinine Trend:  SCr 1.70 [06-19 @ 10:10]  SCr 1.67 [06-19 @ 00:25]  SCr 1.51 [06-18 @ 09:51]  SCr 1.64 [06-18 @ 01:44]  SCr 1.60 [06-17 @ 18:00]    Urine Osmolality 416      [06-17-25 @ 16:45]    TSH 7.29      [06-16-25 @ 23:27]

## 2025-06-19 NOTE — PROGRESS NOTE ADULT - ASSESSMENT
GREG PELAYO is a 74y female with PMH colon cancer stage IV and HTN who was admitted for sepsis and hypotension on norepinephrine    PLAN  =====Neurologic=====  Baseline AOx4  - Currently weak, with episodes of restlessness, oriented x4  - s/p precedex gtt for anxiety/restlessness   - ammonia wnls    =====Pulmonary=====  #Pleural Effusions  - CXR 6/16 shows bilateral large effusions with underlying atelectasis  - 02 sat >95% on 6L NC    =====Cardiovascular=====  #Hypotension  #Shock  #HFrEF  - likely Septic shock  - requiring pressors, off vaso 6/18 , c/w levo 0.06  - started on stress dose steroids w/ hydrocortisone 50 q6hrs (6/17- ) tapered tp 50 q8hrs   - continue to hold home med telmisartan-hydrochlorothiazide 80 mg-12.5 mg qd iso septic shock  - TTE 6/17 Paradoxical septal wall motion. LV systolic function mildly decreased, EF 45 to 50%, global LV hypokinesis. mild (grade 1) left ventricular diastolic dysfunction. RV not well visualized. Mild MR and moderate TR. PASP is 45 mmHg, consistent with mild pHTN. Bilateral pleural effusion noted.      =====GI=====  #Abdominal Pain  #Diarrhea  #Ascites  Concern for gastroenteritis vs peritonitis. CT showing gastroenterocolitis and changes consistent with prior paracentesis when compared to 6/4/25 scans.  - S/p vanc and cefepime x1 in ED  - C/w empiric Zosyn (6/16-  )  - no longer having diarrhea, if returns will check for GI PCR,  Stool culture . per heme/on diarrhea may be 2/2 capecitabine  - S/p US paracentesis on 6/11 (6550L removed) but patient still complaining of tender abdomen. Plan for diagnostic paracentesis, however family deferred at this time  --> no lab records in system/HIE showing results of testing on para-fluid    #Diet  - Keep NPO for now  - Failed dysphagia screen  - SLP eval - recommends to keep NPO and TF to maintain provide adequate nutrition. (pt noted to be lethargic during exam, delayed swallowing)   - Family deferred NGT placement at this time, reports discussing NGT with oncologist and based on their conversation would like to hold off for now  - Patient will require adequate nutrition/hydration, plan to reassess and repeat SLP as mental status improves,  - Nutrition consult      =====Renal/=====  #Hyponatremia  #IVORY  Baseline SCr 0.68 (6/4/25)  - SCr uptrending -> 1.5, likely prerenal 2/2 hemodynamic instability, IV contrast use, and decreased oral intake  - CT A/P with IV contrast 6/16 showed kidneys/ureters within normal limits  - F/u Renal Ultrasound  - Nephrology consulted, now following  - Indwelling catheter placed, monitor I/O's  - S/p 4.5L IVF, POCUS showed IVC 1.3   - hyponatremia likely iso of malignancy, decreased PO intake vs SIADH?  - f/up urine lytes, serum osms 289, and urine osms 416  - s/p intermittently lasix for goal net negative 1L , last dose 40mg IV x2 6/18  - >3043  - trend BMP, VBG w/ lactate, CK Q12hrs    =====Endocrine=====  #Prediabetes  A1c- 5.8%  - monitor FS q6h while NPO and on stress dose steroids    #Abnormal Thyroid Function Tests  #Euthyroid Sick Syndrome  - TSH 7.29, Free thyroxine 0.4, T3 106  - endo consulted, abnormal TFTs likely euthyroid sick syndrome iso critical illness, no recommending treatment at this   - check repeating thyroid function tests  in 4 weeks    =====Infectious Disease=====  #Septic shock  #Hypotensive and hypothermic  #Leukocytosis  #Strep Bacteremia  #Gastroenterocolitis   - WBC 13.46 with 10.3% bandemia, hypothermic   - Lactic acid 10.8, downtrending -> 3  - Procal >100  - S/p vanc and cefepime x1 in ED  - Blood cultures x 1 with growth of strep species in anaerobic bottle (6/16) --> check repeat BCx w/ am labs 6/19  - UCx neg  - C/w Empiric Zosyn (6/16-  )  - ID consulted  - CT a/p showed evidence of gastroenterocolitis, bacteremia possibly from GI source. ddx is bacterial peritonitis iso peritoneal mets. family deferring paracentesis for now.     =====Heme/Onc=====  #metastatic adenocarcinoma (CDX2+, TNM stage: T, M1 unknown primary with peritoneal mets)   - recently started dose reduced single agent cabecitapine 1-2 weeks ago  - CT abdomen and pelvis: interval increase in bilateral large pleural effusions. Peritoneal and serosal implants demonstrate mild interval increase in size, however there is significant decrease in attenuation. Findings   suggestive of posttreatment changes. New diffuse mural thickening of the GI tract, suggestive of gastroenterocolitis. Mild interval decrease of large ascites with trace pneumoperitoneum, representing changes from prior paracentesis.  - heme/onc consulted , holding chemotherapy for now and upon discharge  -Patient to follow up with Dr. Daly to further discuss plan of care. Please contact oncology upon discharge.   -Oncology to sign off. Please call with any questions or concerns.     #DVT Ppx  - SQ Heparin     ====Skin=====  #Extravasation  - Left forearm PIV site- levophed running through PIV with signs of extravasation under ultrasound visualization .Extremity found to be red, mildly edematous, blanchable, pulses intact. patent flow on bedside US. Cap refill present distally,  skin intact without blistering or breakdown.   - Pressor extravasation protocol, S/p phentolamine and Nitropatse applied   - C/w Vascular checks as per protocol  - RUE noted to be purple yesterday morning, pulses intact, A line was removed  - RUE VA duplex 6/1/ : No occlusive arterial disease noted in the right upper extremity    ====Lines=====  Right UA Carrol- 6/17-6/18  RIJ TLC 6/17  Indwelling Catheter 6/17    =====Ethics=====  FULL CODE GREG PELAYO is a 74y female with PMH colon cancer stage IV and HTN who was admitted for sepsis and hypotension on norepinephrine    PLAN  =====Neurologic=====  Baseline AOx4  - Currently weak, opens eyes to name, intermittently responsive  - s/p precedex gtt for anxiety/restlessness   - ammonia wnls  - per d/w family plan for eventual transition to comfort care measures, will initiate Dilaudid gtt and ativan prn    =====Pulmonary=====  #Pleural Effusions  - CXR 6/16 shows bilateral large effusions with underlying atelectasis  - satting well on NRB  - pocus with increase in size of b/l large pleural effusions today  - noted w/ enlarged RV w/ septal flattening, concern for underlying PE, empirically started on Argatroban gtt gtt (iso drop in plts, checking HIT panel)  - c/w diuresis with bumex gtt- suboptimal response, poor CRRT candidate per nephro  - DNI, no further lab draws    =====Cardiovascular=====  #Hypotension  #Shock  #HFrEF  #RV Dysfunction  - likely Septic shock  - requiring pressor support with levophed and vasopressin  - started on stress dose steroids w/ hydrocortisone 50 q6hrs (6/17) tapered tp 50 q8hrs 6/18  - continue to hold home med telmisartan-hydrochlorothiazide 80 mg-12.5 mg qd iso septic shock  - TTE 6/17 Paradoxical septal wall motion. LV systolic function mildly decreased, EF 45 to 50%, global LV hypokinesis. mild (grade 1) left ventricular diastolic dysfunction. RV not well visualized. Mild MR and moderate TR. PASP is 45 mmHg, consistent with mild pHTN. Bilateral pleural effusion noted.  - pocus today with enlarged RV with septal flattening, dilated IVC  - BNP 15k, trop 97/101, EKG without acute changes  - check repeat TTE w/ bubble study  - diuresis wth bumex gtt as per renal plan below - suboptimal response to diureses   - DNR    =====GI=====  #Abdominal Pain  #Diarrhea  #Gastroenterocolitis  #Ascites  #Peritoneal Mets  - Concern for gastroenteritis vs peritonitis. CT showing gastroenterocolitis and changes consistent with prior paracentesis when compared to 6/4/25 scans.  - S/p vanc and cefepime x1 in ED, c/w empiric Zosyn (6/16-  )  - no longer having diarrhea, if returns will check for GI PCR,  Stool culture . per heme/on diarrhea may be 2/2 capecitabine  - S/p US paracentesis on 6/11 (6550L removed) but patient still complaining of tender abdomen. Plan for diagnostic paracentesis, however family deferred at this time  --> no lab records in system/HIE showing results of testing on para-fluid    #Diet  - NPO, no NGT placement following GOC with family    =====Renal/=====  #Hyponatremia  #IVORY  #Oliguria  Baseline SCr 0.68 (6/4/25)  - SCr uptrending -> 1.7, likely prerenal 2/2 hemodynamic instability, IV contrast use, and decreased oral intake  - CT A/P with IV contrast 6/16 showed kidneys/ureters within normal limits  - Renal Ultrasound - Renal parenchymal disease without hydronephrosis.  - Indwelling catheter placed, monitor I/O's  - S/p 4.5L IVF, POCUS showed IVC 1.3   - hyponatremia likely iso of malignancy, decreased PO intake vs SIADH?  - serum osms 289, and urine osms 416  - s/p intermittently lasix and bumex IVP with minimal UOP, started on bumex gtt with suboptimal UOP  - >3043  - Nephrology consulted, pt poor CRRT candidate. rec GOC with family. see below.   - no further lab draws    =====Endocrine=====  #Prediabetes  - A1c- 5.8%  - no further FS    #Abnormal Thyroid Function Tests  #Euthyroid Sick Syndrome  - TSH 7.29, Free thyroxine 0.4, T3 106  - endo consulted, abnormal TFTs likely euthyroid sick syndrome iso critical illness, no recommending treatment at this   - check repeating thyroid function tests  in 4 weeks    =====Infectious Disease=====  #Septic shock  #Hypotensive and hypothermic  #Leukocytosis  #Strep Bacteremia  #Gastroenterocolitis   - WBC 13.46 with 10.3% bandemia, hypothermic   - Lactic acid 10.8, downtrending -> 3  - Procal >100  - S/p vanc and cefepime x1 in ED  - Blood cultures x 1 with growth of strep species in anaerobic bottle (6/16) --> check repeat BCx w/ am labs 6/19  - UCx neg  - C/w Empiric Zosyn (6/16-  )  - ID consulted  - CT a/p showed evidence of gastroenterocolitis, bacteremia possibly from GI source. ddx is bacterial peritonitis iso peritoneal mets. family deferring paracentesis for now.     =====Heme/Onc=====  #metastatic adenocarcinoma (CDX2+, TNM stage: T, M1 unknown primary with peritoneal mets)   - recently started dose reduced single agent cabecitapine 1-2 weeks ago  - CT abdomen and pelvis: interval increase in bilateral large pleural effusions. Peritoneal and serosal implants demonstrate mild interval increase in size, however there is significant decrease in attenuation. Findings   suggestive of posttreatment changes. New diffuse mural thickening of the GI tract, suggestive of gastroenterocolitis. Mild interval decrease of large ascites with trace pneumoperitoneum, representing changes from prior paracentesis.  - heme/onc consulted , holding chemotherapy for now and upon discharge  -Patient to follow up with Dr. Daly to further discuss plan of care. Please contact oncology upon discharge.   -Oncology to sign off. Please call with any questions or concerns.     #DVT Ppx  - started argatroban gtt iso dropping plts, HIT panel sent, monitor pTT  -  no further lab draws    ====Skin=====  #Extravasation  - Left forearm PIV site- levophed running through PIV with signs of extravasation under ultrasound visualization .Extremity found to be red, mildly edematous, blanchable, pulses intact. patent flow on bedside US. Cap refill present distally,  skin intact without blistering or breakdown.   - Pressor extravasation protocol, S/p phentolamine and Nitropaste applied   - C/w Vascular checks as per protocol  - RUE noted to be purple yesterday morning, pulses intact, A line was removed  - RUE VA duplex 6/1/ : No occlusive arterial disease noted in the right upper extremity    ====Lines=====  Right UA Carrol- 6/17-6/18  RIJ TLC 6/17  Indwelling Catheter 6/17  LUE Axillary A line 6/19    =====Ethics=====  DRN/DNI    6/19- pt noted with rising pressor requirements, enlarged RV w/ decreased RVSF on pocus. also with concomitant suboptimal UOP on bumex gtt. case d/w nephrology attending, pt poor crrt candidate given comorbidities and shock state. had GOC conversation with pt's six children (Harrison, Angely, Johanna, Corey, Ace, Walter). they understand pt's overall poor prognosis given her multiorgan failure, severe deconditioning and underlying metastatic cancer. pt's children do not wish to continue measures that may potentially prolong their mother's suffering with little overall benefit. decision made for DNR/DNI code status, no further lab draws, no feeding tube placement. plan is for transition to comfort care measures and compassionate withdrawal of care after they spend time with her.  has been called per family request for [employer]. all of pt's children are in agreement with plan. emotional support provided, all questions answered.

## 2025-06-19 NOTE — PROGRESS NOTE ADULT - CRITICAL CARE ATTENDING COMMENT
Patient is a 73 yo F w/ Stage IV Colon Ca and HTN who is admitted to MICU for septic shock in setting of Strep bacteremia in setting of gastroenterocolitis.    Pressor requirements were improving but then increased early this AM    RV now enlarged with decreased RVSF on POCUS    #Septic Shock  #Strep Bacteremia  #Gastroenterocolitis  #IVORY  #Encephalopathy  - Hold sedating medications, monitor ABG. Monitor end tidal  - Monitor airway, NPO for now  - c/w vasopressors to maintain MAP > 65, wean as tolerated, improved. Readd Vaso  - Repeat TTE with bubble study  - c/w stress dose steroids for now  - c/w empiric Zosyn, f/u cultures  - Concern for new PE, will empirically start Argatroban gtt as plts also dropped. Check HIT panel  - ID consult appreciated  - Will given Bumex 4mg IV push x 1 and start gtt. If no UOP will discuss with renal re CRRT  - Monitor UOP and BMP  - TSH elevated to 7.3, Endo consult appreciated, likely sick euthyroid  - Ascites appears simple, diagnositic para likely low yield now after abx, family had deferred on admission    #DVT ppx - Argatroban gtt    #GOC - Full Code    Morris Neil MD  Pulmonary & Critical Care Patient is a 75 yo F w/ Stage IV Colon Ca and HTN who is admitted to MICU for septic shock in setting of Strep bacteremia in setting of gastroenterocolitis.    Pressor requirements were improving but then increased early this AM    RV now enlarged with decreased RVSF on POCUS    #Septic Shock  #Strep Bacteremia  #Gastroenterocolitis  #IVORY  #Encephalopathy  - Hold sedating medications, monitor ABG. Monitor end tidal  - Monitor airway, NPO for now  - c/w vasopressors to maintain MAP > 65, wean as tolerated, improved. Readd Vaso  - Repeat TTE with bubble study  - c/w stress dose steroids for now  - c/w empiric Zosyn, f/u cultures  - Concern for new PE, will empirically start Argatroban gtt as plts also dropped. Check HIT panel  - Check cardiac enzymes, EKG, trend lactate  - ID consult appreciated  - Will given Bumex 4mg IV push x 1 and start gtt. If no UOP will discuss with renal re CRRT  - Monitor UOP and BMP  - TSH elevated to 7.3, Endo consult appreciated, likely sick euthyroid  - Ascites appears simple, diagnositic para likely low yield now after abx, family had deferred on admission    #DVT ppx - Argatroban gtt    #GOC - Full Code    Morris Neil MD  Pulmonary & Critical Care Patient is a 75 yo F w/ Stage IV Colon Ca and HTN who is admitted to MICU for septic shock in setting of Strep bacteremia in setting of gastroenterocolitis.    Pressor requirements were improving but then increased early this AM    RV now enlarged with decreased RVSF on POCUS    #Septic Shock  #Strep Bacteremia  #Gastroenterocolitis  #IVORY  #Encephalopathy  - Hold sedating medications, monitor ABG. Monitor end tidal  - Monitor airway, NPO for now  - c/w vasopressors to maintain MAP > 65, wean as tolerated, improved. Readd Vaso  - Repeat TTE with bubble study  - c/w stress dose steroids for now  - c/w empiric Zosyn, f/u cultures  - Concern for new PE, will empirically start Argatroban gtt as plts also dropped. Check HIT panel  - Check cardiac enzymes, EKG, trend lactate  - ID consult appreciated  - Will given Bumex 4mg IV push x 1 and start gtt. If no UOP will discuss with renal re CRRT  - Monitor UOP and BMP  - TSH elevated to 7.3, Endo consult appreciated, likely sick euthyroid  - Ascites appears simple, diagnostic para likely low yield now after abx, family had deferred on admission    #DVT ppx - Argatroban gtt    #GOC - Full Code    Morris Neil MD  Pulmonary & Critical Care

## 2025-06-19 NOTE — PROGRESS NOTE ADULT - SUBJECTIVE AND OBJECTIVE BOX
CC: F/U for Positive BCX    Saw/spoke to patient. Unchanged. No new complaints.    Allergies  No Known Allergies    ANTIMICROBIALS:  piperacillin/tazobactam IVPB.. 3.375 every 8 hours    PE:    Vital Signs Last 24 Hrs  T(C): 37.1 (19 Jun 2025 12:00), Max: 37.8 (18 Jun 2025 20:00)  T(F): 98.7 (19 Jun 2025 12:00), Max: 100 (18 Jun 2025 20:00)  HR: 119 (19 Jun 2025 13:00) (87 - 127)  BP: 96/60 (19 Jun 2025 10:00) (73/56 - 115/79)  BP(mean): 73 (19 Jun 2025 10:00) (63 - 91)  RR: 25 (19 Jun 2025 13:00) (18 - 38)  SpO2: 96% (19 Jun 2025 13:00) (93% - 100%)    Gen: AOx3, NAD, non-toxic  CV: Nontachycardic  Resp: Breathing comfortably, RA  Abd: Soft, nontender  IV/Skin: No thrombophlebitis    LABS:                        10.2   29.01 )-----------( 144      ( 19 Jun 2025 10:10 )             30.5     06-19    130[L]  |  89[L]  |  41[H]  ----------------------------<  196[H]  4.4   |  21[L]  |  1.70[H]    Ca    7.2[L]      19 Jun 2025 10:10  Phos  5.8     06-19  Mg     1.90     06-19    TPro  4.9[L]  /  Alb  2.2[L]  /  TBili  0.5  /  DBili  x   /  AST  160[H]  /  ALT  47[H]  /  AlkPhos  128[H]  06-19    Urinalysis Basic - ( 19 Jun 2025 10:10 )    Color: x / Appearance: x / SG: x / pH: x  Gluc: 196 mg/dL / Ketone: x  / Bili: x / Urobili: x   Blood: x / Protein: x / Nitrite: x   Leuk Esterase: x / RBC: x / WBC x   Sq Epi: x / Non Sq Epi: x / Bacteria: x    MICROBIOLOGY:    Catheterized Catheterized  06-17-25   No growth  --  --    Blood Blood-Peripheral  06-16-25   Growth in anaerobic bottle: Streptococcus sanguinis "Susceptibilities not  performed"  Growth in anaerobic bottle: Gram Positive Rods  Direct identification is available within approximately 3-5  hours either by Blood Panel Multiplexed PCR or Direct  MALDI-TOF. Details: [email or web address]  --  Blood Culture PCR    Blood Blood-Peripheral  06-16-25   No growth at 48 Hours  --  --    Rapid RVP Result: Paul (06-16 @ 12:16)    RADIOLOGY:    6/19    No evidence of deep venous thrombosis in either lower extremity.

## 2025-06-19 NOTE — GOALS OF CARE CONVERSATION - ADVANCED CARE PLANNING - CONVERSATION DETAILS
pt noted with rising pressor requirements, enlarged RV w/ decreased RVSF on pocus. also with concomitant suboptimal UOP on bumex gtt. case d/w nephrology attending, pt poor crrt candidate given comorbidities and shock state. had GOC conversation with pt's six children (Harrison, Angely, Johanna, Corey, Ace, Walter). they understand pt's overall poor prognosis given her multiorgan failure, severe deconditioning and underlying metastatic cancer. pt's children do not wish to continue measures that may potentially prolong their mother's suffering with little overall benefit. decision made for DNR/DNI code status, no further lab draws, no feeding tube placement. plan is for transition to comfort care measures and compassionate withdrawal of care after they spend time with her.  has been called per family request for Last Rights. all of pt's children are in agreement with plan. emotional support provided, all questions answered.

## 2025-06-19 NOTE — CHART NOTE - NSCHARTNOTESELECT_GEN_ALL_CORE
Event Note
Event Note
Nutrition Services
Patient Education     Puncture Wound: Foot  Â     A puncture wound occurs when a pointed object (such as a nail) pushes into the skin. It may go into the tissues below the skin of the foot, including fat and muscle. This type of wound is narrow and deep. They can be hard to clean. Puncture wounds are at high risk for becoming infected. One type of serious infection is more likely if you were wearing a rubber-soled shoe at the time of injury. Bacteria from the sole of the shoe may be dragged into the wound. Symptoms of infection may appear as late as 2 to 3 weeks after the injury. Be sure to watch for symptoms of infection and call your healthcare provider right away if any them appear. X-rays may be done to see whether any objects remain under the skin. Your may also need a tetanus shot. This is given if you are not up-to-date on this vaccination and the object that caused the wound may lead to tetanus. Puncture wounds can easily become infected. Â   Home care  Â· When you sit or lie down, raise the footÂ above the level of your heart. Â This helps reduce swelling and pain. Â· Don't put weight on the injured foot if it hurts to do soÂ or if you were told to keep weight off the injury. Â· Your healthcare provider may prescribe an antibiotic. This is to help prevent infection. Follow all instructions for taking this medicine. Take the medicine every day until it is gone or you are told to stop. You should not have any left over. Â· The healthcare provider may prescribe medicines for pain. Follow instructions for taking them. Â· You can take acetaminophen or ibuprofen for pain, unless you were given a different pain medicine to use. Â   Â· Follow the healthcare providerâs instructions on how to care for the wound. Â· Keep the wound clean and dry. Don't get the wound wet until you are told it is OK to do so. If the area gets wet, gently pat it dry with a clean cloth. Replace the wet bandage with a dry one.   Â· If a bandage was
A Line Removal/Event Note
applied and it becomes wet or dirty, replace it. Otherwise, leave it in place for the first 24 hours. Â· Once you can get the wound wet, you may shower as usual but don't soak the wound in water (no tub baths or swimming)  Â· Check the wound daily for symptoms of infection. These include:  ? Increasing redness or swelling around the wound  ? Increased warmth of the wound  ? Worsening pain  ? Red streaking lines away from the wound  ? Draining pus  Follow-up care  Follow up with your healthcare provider, or as advised. Â   When to seek medical advice  Call your healthcare provider right away if any of these occur:  Â· Any symptoms of infection (listed above)  Â· Fever of 100.4Â°F (38. [facility] AT [place]) or higher, or as directed by your healthcare provider  Â· Wound changes colors  Â· Numbness around the wound  Â· Decreased movement around the injured area  [place] last reviewed this educational content on 8/1/2018  Â© 9254-0772 [facility]. All rights reserved. This information is not intended as a substitute for professional medical care. Always follow your healthcare professional's instructions.

## 2025-06-19 NOTE — DISCHARGE NOTE FOR THE EXPIRED PATIENT - HOSPITAL COURSE
Ms. Kilpatrick, a 74-year-old female with stage IV colon cancer and hypertension, was brought to the hospital by EMS after her daughter requested a wellness check, having not heard from her in two days.  EMS found Ms. Kilpatrick lying in her bed surrounded by feces, hypotensive, and very drowsy.  She received approximately 200cc of IV fluids en route. Upon arrival, she denied pain but reported a stomachache the previous night and multiple episodes of diarrhea.  She denied any falls or head trauma.  She presented as weak, with a rectal temperature of 92.4°F.  Point-of-care ultrasound (POCUS) revealed a collapsible IVC and no obvious effusions or B-lines on lung exam, indicating severe hypovolemia and likely septic shock.    Ms. Kilpatrick's hospital course was complicated by multi-organ failure.  Her hypotension required escalating pressor support with norepinephrine and vasopressin.  She was also started on stress-dose steroids. A transthoracic echocardiogram (TTE) showed mildly decreased left ventricular systolic function with an ejection fraction of 45-50%, global LV hypokinesis, mild left ventricular diastolic dysfunction, mild mitral regurgitation, moderate tricuspid regurgitation, and mild pulmonary hypertension.  The right ventricle was not well visualized.  Subsequent POCUS exams revealed an enlarged right ventricle with septal flattening and a dilated IVC, suggestive of worsening right heart strain.  She developed acute kidney injury (IVORY), likely prerenal from hypovolemia and hypotension, evidenced by a rising creatinine. Despite IV fluids and attempts at diuresis with bumex, her urine output remained suboptimal. Nephrology consulted and deemed her a poor candidate for continuous renal replacement therapy (CRRT).    Her initial workup included a CT scan of the abdomen and pelvis which showed findings consistent with gastroenterocolitis and changes from a recent paracentesis performed a few days prior to admission.  She received empiric antibiotics for suspected sepsis and gastroenterocolitis.  Blood cultures grew Streptococcus species.  Although a repeat paracentesis was considered to investigate possible peritonitis given her abdominal tenderness, the family declined the procedure.  Diffuse gastrointestinal mural thickening was also observed on CT, likely related to gastroenterocolitis. Her significantly elevated procalcitonin and lactic acid (which later downtrended) further supported the diagnosis of sepsis.  Infectious Disease was consulted and concurred with the plan.    Further complicating her course, Ms. Kilpatrick developed bilateral pleural effusions, seen on chest x-ray and confirmed by POCUS.  Given the enlarged right ventricle and pleural effusions, a pulmonary embolism (PE) was suspected, and she was empirically started on argatroban.  A drop in platelets prompted a heparin-induced thrombocytopenia (HIT) panel.  Her low free thyroxine and elevated TSH, in the context of her critical illness, were attributed to euthyroid sick syndrome by Endocrinology, who recommended no specific treatment.  She also experienced a pressor extravasation in her left forearm, which was managed per protocol.    Ms. Kilpatrick’s oncology history also played a significant role in her care. She had recently started a reduced dose of capecitabine for her metastatic colon cancer. The CT scan showed interval growth of her peritoneal and serosal implants, but with decreased attenuation, suggesting possible treatment response.  However, given her critical condition, Hematology/Oncology held further chemotherapy.    A critical turning point in Ms. Kilpatrick’s care occurred when her pressor requirements continued to rise, her right ventricular function worsened, and her urine output remained poor despite interventions. Nephrology reiterated that she was not a candidate for CRRT. A goals of care discussion was held with Ms. Kilpatrick’s six children (Harrison, Angely, Johanna, Corye, Ace, and Walter).  The medical team explained her poor prognosis given her multi-organ failure, severe deconditioning, and underlying metastatic cancer.  Her children understood the gravity of the situation and expressed their desire to prioritize her comfort and avoid prolonging her suffering with interventions that offered little chance of meaningful recovery.  Consequently, the decision was made to transition to comfort care.  She was made DNR/DNI, further lab draws were discontinued, and a feeding tube was not placed.  The family requested Last Rites, which were performed. All six children were in agreement with the plan, and the medical team provided emotional support and answered their questions.  This difficult but compassionate decision allowed Ms. Kilpatrick to receive comfort-focused care during her final moments.

## 2025-06-19 NOTE — PROGRESS NOTE ADULT - ASSESSMENT
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 Potential GI source based on imaging  Rising LFTs noted  Overall, Bacteremia, Leukocytosis  - Zosyn 3.375g q 8, monitor CrCl, borderline, would need decreased dose if worsening Cr  - Please repeat BCXs for clear  - Hold on ROOPA for now  - If checking paracentesis, would check cells and cultures  - Trend WBC to normal, trend LFTs (If worsening LFTs, check RUQ USG)  - Monitor for other sources    Henry Flores MD  Contact on TEAMS messaging from 9am - 5pm  From 5pm-9am, on weekends, or if no response call [phone]    Antibiotic decision making based on local antibiogram and available recent culture results

## 2025-06-19 NOTE — PROGRESS NOTE ADULT - PROBLEM SELECTOR PLAN 2
Pt. with hyponatremia in the setting of malignancy, decreased PO intake and ADH stimulation due to pain. On review of [facility] HIE/[place], SNa WNL at 135 on 6/4/25. SNa low at 131 on admission (6/16/25). SNa with further decrease to 125 yesterday (6/17/25). Pt. previously initiated on bicarb infusion and steroids. SNa now improved at 128 today (6/18/25). Fluids stopped.    Please obtain urine lytes. Pt. still appears volume depleted. Consider NS. Optimize glucose control. Monitor SNa.    **Recommendations preliminary until attending attestation**  If you have any questions, please feel free to contact me  Washington Alva  Nephrology Fellow  Page [phone] / [employer] Teams (Preferred)  (After 4pm or on weekends please page the on-call fellow).
Pt. with hyponatremia in the setting of malignancy, decreased PO intake and ADH stimulation due to pain. On review of [facility] HIE/[place], SNa WNL at 135 on 6/4/25. SNa low at 131 on admission (6/16/25). SNa with further decrease to 125 yesterday (6/17/25). Pt. previously initiated on bicarb infusion and steroids. SNa now improved at 132 today (6/19/25). Optimize glucose control. Monitor SNa.    **Recommendations preliminary until attending attestation**  If you have any questions, please feel free to contact me  Washington Alva  Nephrology Fellow  Page [phone] / [employer] Teams (Preferred)  (After 4pm or on weekends please page the on-call fellow).

## 2025-06-19 NOTE — PROGRESS NOTE ADULT - ATTENDING COMMENTS
IVORY  Hyponatremia    Pt with elevated Cr today, low blood pressures  Cont pressors, supportive care  Monitor labs and Is/Os
IVORY   ATN  Hyponatremia    Pt is becoming more oliguric   Prognosis poor however decision made for comfort measures  Will sign off for now, please call with questions

## 2025-06-19 NOTE — GOALS OF CARE CONVERSATION - ADVANCED CARE PLANNING - TREATMENT GUIDELINES
Group Topic: [employer] Activity Group    Date: 8/20/2022  Start Time: 1300  End Time: 1330  Facilitators: Riya Garcia LPC    Focus:  Activity Group  Number in attendance:  9    Time was spent in the healing garden socializing, relaxing and doing light exercise.   technician was also present.    Method: Group   Attendance: Present  Participation: Active  Patient Response: Attentive  Mood: Normal  Affect: Type: Euthymic (normal mood)   Range: Full (normal)   Congruency: Congruent   Stability: Stable  Behavior/Socialization: Cooperative  Thought Process: Focused  Task Performance: Follows directions  Patient Evaluation: Independent - full participation     Patient seemed to enjoy the healing garden activity and spent time talking with peers and staff and enjoying nature.    Riya Garcia LPC         DNR/No blood draws/No artificial nutrition/DNI

## 2025-06-19 NOTE — PROGRESS NOTE ADULT - SUBJECTIVE AND OBJECTIVE BOX
INTERVAL HPI/OVERNIGHT EVENTS:     SUBJECTIVE: Patient seen and examined at bedside.       VITAL SIGNS:  ICU Vital Signs Last 24 Hrs  T(C): 36.4 (19 Jun 2025 04:00), Max: 37.8 (18 Jun 2025 20:00)  T(F): 97.5 (19 Jun 2025 04:00), Max: 100 (18 Jun 2025 20:00)  HR: 105 (19 Jun 2025 06:45) (69 - 114)  BP: 101/64 (19 Jun 2025 06:45) (60/48 - 133/82)  BP(mean): 77 (19 Jun 2025 06:45) (55 - 97)  ABP: --  ABP(mean): --  RR: 26 (19 Jun 2025 06:45) (10 - 38)  SpO2: 96% (19 Jun 2025 06:45) (93% - 100%)    O2 Parameters below as of 19 Jun 2025 06:45  Patient On (Oxygen Delivery Method): nasal cannula w/ humidification  O2 Flow (L/min): 2        06-17 @ 07:01  -  06-18 @ 07:00  --------------------------------------------------------  IN: 4147.9 mL / OUT: 685 mL / NET: 3462.9 mL    06-18 @ 07:01  -  06-19 @ 06:51  --------------------------------------------------------  IN: 983.9 mL / OUT: 840 mL / NET: 143.9 mL      CAPILLARY BLOOD GLUCOSE      POCT Blood Glucose.: 144 mg/dL (19 Jun 2025 05:33)    ECG:    PHYSICAL EXAM:  GENERAL: NAD, cachectic, ill appearing    HEAD:  Atraumatic, Normocephalic  EYES: EOMI, PERRLA, conjunctiva and sclera clear  ENMT: No tonsillar erythema, exudates, or enlargement; Moist mucous membranes, Good dentition, No lesions  NECK: Supple, No JVD, Normal thyroid  CHEST/LUNG: Clear to percussion bilaterally; No rales, rhonchi, wheezing, or rubs  HEART: Regular rate and rhythm; No murmurs, rubs, or gallops  ABDOMEN: Soft, Nontender, Nondistended; Bowel sounds present  NERVOUS SYSTEM:  Alert & Oriented X1, lethargic, profoundly weak in all extremities   EXTREMITIES:  2+ Peripheral Pulses, No clubbing, cyanosis, or edema  LYMPH: No lymphadenopathy noted  SKIN: No rashes or lesions    MEDICATIONS:  MEDICATIONS  (STANDING):  chlorhexidine 2% Cloths 1 Application(s) Topical <User Schedule>  heparin   Injectable 5000 Unit(s) SubCutaneous every 12 hours  hydrocortisone sodium succinate Injectable 50 milliGRAM(s) IV Push every 8 hours  mupirocin 2% Nasal 1 Application(s) Both Nostrils two times a day  nitroglycerin    2% Ointment 2 Inch(s) Transdermal once  norepinephrine Infusion 0.15 MICROgram(s)/kG/Min (6.2 mL/Hr) IV Continuous <Continuous>  pantoprazole  Injectable 40 milliGRAM(s) IV Push daily  piperacillin/tazobactam IVPB.. 3.375 Gram(s) IV Intermittent every 8 hours    MEDICATIONS  (PRN):      ALLERGIES:  Allergies    No Known Allergies    Intolerances        LABS:                        10.7   30.35 )-----------( 144      ( 19 Jun 2025 00:25 )             32.2     06-19    130[L]  |  89[L]  |  40[H]  ----------------------------<  153[H]  4.1   |  26  |  1.67[H]    Ca    7.2[L]      19 Jun 2025 00:25  Phos  4.9     06-19  Mg     1.80     06-19    TPro  4.7[L]  /  Alb  1.9[L]  /  TBili  0.4  /  DBili  x   /  AST  172[H]  /  ALT  48[H]  /  AlkPhos  120  06-19    PT/INR - ( 19 Jun 2025 00:25 )   PT: 14.1 sec;   INR: 1.19 ratio         PTT - ( 19 Jun 2025 00:25 )  PTT:34.6 sec  Urinalysis Basic - ( 19 Jun 2025 00:25 )    Color: x / Appearance: x / SG: x / pH: x  Gluc: 153 mg/dL / Ketone: x  / Bili: x / Urobili: x   Blood: x / Protein: x / Nitrite: x   Leuk Esterase: x / RBC: x / WBC x   Sq Epi: x / Non Sq Epi: x / Bacteria: x        RADIOLOGY & ADDITIONAL TESTS: Reviewed.   INTERVAL HPI/OVERNIGHT EVENTS: levophed requirements decreased 0.15 to 0.1 this morning, remains off vasopressin. received albumin 250 x1 and bumex 2 x1.     SUBJECTIVE: Patient seen and examined at bedside.       VITAL SIGNS:  ICU Vital Signs Last 24 Hrs  T(C): 36.4 (19 Jun 2025 04:00), Max: 37.8 (18 Jun 2025 20:00)  T(F): 97.5 (19 Jun 2025 04:00), Max: 100 (18 Jun 2025 20:00)  HR: 105 (19 Jun 2025 06:45) (69 - 114)  BP: 101/64 (19 Jun 2025 06:45) (60/48 - 133/82)  BP(mean): 77 (19 Jun 2025 06:45) (55 - 97)  ABP: --  ABP(mean): --  RR: 26 (19 Jun 2025 06:45) (10 - 38)  SpO2: 96% (19 Jun 2025 06:45) (93% - 100%)    O2 Parameters below as of 19 Jun 2025 06:45  Patient On (Oxygen Delivery Method): nasal cannula w/ humidification  O2 Flow (L/min): 2      06-17 @ 07:01  -  06-18 @ 07:00  --------------------------------------------------------  IN: 4147.9 mL / OUT: 685 mL / NET: 3462.9 mL    06-18 @ 07:01 - 06-19 @ 06:51  --------------------------------------------------------  IN: 983.9 mL / OUT: 840 mL / NET: 143.9 mL      CAPILLARY BLOOD GLUCOSE      POCT Blood Glucose.: 144 mg/dL (19 Jun 2025 05:33)    ECG:    PHYSICAL EXAM:  GENERAL: NAD, cachectic, ill appearing    HEAD:  Atraumatic, Normocephalic  EYES: EOMI, PERRLA, conjunctiva and sclera clear  ENMT: No tonsillar erythema, exudates, or enlargement; Moist mucous membranes, Good dentition, No lesions  NECK: Supple, No JVD, Normal thyroid  CHEST/LUNG: Clear to percussion bilaterally; No rales, rhonchi, wheezing, or rubs  HEART: Regular rate and rhythm; No murmurs, rubs, or gallops  ABDOMEN: Soft, Nontender, Nondistended; Bowel sounds present  NERVOUS SYSTEM:  Alert & Oriented X1, lethargic, profoundly weak in all extremities   EXTREMITIES:  2+ Peripheral Pulses, No clubbing, cyanosis, or edema  LYMPH: No lymphadenopathy noted  SKIN: No rashes or lesions    MEDICATIONS:  MEDICATIONS  (STANDING):  chlorhexidine 2% Cloths 1 Application(s) Topical <User Schedule>  heparin   Injectable 5000 Unit(s) SubCutaneous every 12 hours  hydrocortisone sodium succinate Injectable 50 milliGRAM(s) IV Push every 8 hours  mupirocin 2% Nasal 1 Application(s) Both Nostrils two times a day  nitroglycerin    2% Ointment 2 Inch(s) Transdermal once  norepinephrine Infusion 0.15 MICROgram(s)/kG/Min (6.2 mL/Hr) IV Continuous <Continuous>  pantoprazole  Injectable 40 milliGRAM(s) IV Push daily  piperacillin/tazobactam IVPB.. 3.375 Gram(s) IV Intermittent every 8 hours    MEDICATIONS  (PRN):      ALLERGIES:  Allergies    No Known Allergies    Intolerances        LABS:                        10.7   30.35 )-----------( 144      ( 19 Jun 2025 00:25 )             32.2     06-19    130[L]  |  89[L]  |  40[H]  ----------------------------<  153[H]  4.1   |  26  |  1.67[H]    Ca    7.2[L]      19 Jun 2025 00:25  Phos  4.9     06-19  Mg     1.80     06-19    TPro  4.7[L]  /  Alb  1.9[L]  /  TBili  0.4  /  DBili  x   /  AST  172[H]  /  ALT  48[H]  /  AlkPhos  120  06-19    PT/INR - ( 19 Jun 2025 00:25 )   PT: 14.1 sec;   INR: 1.19 ratio         PTT - ( 19 Jun 2025 00:25 )  PTT:34.6 sec  Urinalysis Basic - ( 19 Jun 2025 00:25 )    Color: x / Appearance: x / SG: x / pH: x  Gluc: 153 mg/dL / Ketone: x  / Bili: x / Urobili: x   Blood: x / Protein: x / Nitrite: x   Leuk Esterase: x / RBC: x / WBC x   Sq Epi: x / Non Sq Epi: x / Bacteria: x        RADIOLOGY & ADDITIONAL TESTS: Reviewed.   INTERVAL HPI/OVERNIGHT EVENTS: levophed requirements decreased 0.15 to 0.1 this morning, received albumin 250 x1 and bumex 2 x1. later this morning noted with increasing levophed requirements to 0.3, vasopressin restarted. RV enlarged on pocus with poor UOP despite bumex gtt.    SUBJECTIVE: Patient seen and examined at bedside. lethargic, opens eyes to name, nods yes and no. denies pain.       VITAL SIGNS:  ICU Vital Signs Last 24 Hrs  T(C): 36.4 (19 Jun 2025 04:00), Max: 37.8 (18 Jun 2025 20:00)  T(F): 97.5 (19 Jun 2025 04:00), Max: 100 (18 Jun 2025 20:00)  HR: 105 (19 Jun 2025 06:45) (69 - 114)  BP: 101/64 (19 Jun 2025 06:45) (60/48 - 133/82)  BP(mean): 77 (19 Jun 2025 06:45) (55 - 97)  ABP: --  ABP(mean): --  RR: 26 (19 Jun 2025 06:45) (10 - 38)  SpO2: 96% (19 Jun 2025 06:45) (93% - 100%)    O2 Parameters below as of 19 Jun 2025 06:45  Patient On (Oxygen Delivery Method): nasal cannula w/ humidification  O2 Flow (L/min): 2      06-17 @ 07:01 - 06-18 @ 07:00  --------------------------------------------------------  IN: 4147.9 mL / OUT: 685 mL / NET: 3462.9 mL    06-18 @ 07:01  -  06-19 @ 06:51  --------------------------------------------------------  IN: 983.9 mL / OUT: 840 mL / NET: 143.9 mL      CAPILLARY BLOOD GLUCOSE      POCT Blood Glucose.: 144 mg/dL (19 Jun 2025 05:33)    ECG:    PHYSICAL EXAM:  GENERAL: NAD, cachectic, ill appearing    HEAD:  Atraumatic, Normocephalic  EYES: EOMI, PERRLA, conjunctiva and sclera clear  ENMT: No tonsillar erythema, exudates, or enlargement; Moist mucous membranes, Good dentition, No lesions  NECK: Supple, No JVD, Normal thyroid  CHEST/LUNG: Clear to percussion bilaterally; No rales, rhonchi, wheezing, or rubs  HEART: Regular rate and rhythm; No murmurs, rubs, or gallops  ABDOMEN: Soft, Nontender, Nondistended; Bowel sounds present  NERVOUS SYSTEM:  Alert & Oriented X1, lethargic, profoundly weak in all extremities   EXTREMITIES:  2+ Peripheral Pulses, No clubbing, cyanosis, or edema  LYMPH: No lymphadenopathy noted  SKIN: No rashes or lesions    MEDICATIONS:  MEDICATIONS  (STANDING):  chlorhexidine 2% Cloths 1 Application(s) Topical <User Schedule>  heparin   Injectable 5000 Unit(s) SubCutaneous every 12 hours  hydrocortisone sodium succinate Injectable 50 milliGRAM(s) IV Push every 8 hours  mupirocin 2% Nasal 1 Application(s) Both Nostrils two times a day  nitroglycerin    2% Ointment 2 Inch(s) Transdermal once  norepinephrine Infusion 0.15 MICROgram(s)/kG/Min (6.2 mL/Hr) IV Continuous <Continuous>  pantoprazole  Injectable 40 milliGRAM(s) IV Push daily  piperacillin/tazobactam IVPB.. 3.375 Gram(s) IV Intermittent every 8 hours    MEDICATIONS  (PRN):      ALLERGIES:  Allergies    No Known Allergies    Intolerances        LABS:                        10.7   30.35 )-----------( 144      ( 19 Jun 2025 00:25 )             32.2     06-19    130[L]  |  89[L]  |  40[H]  ----------------------------<  153[H]  4.1   |  26  |  1.67[H]    Ca    7.2[L]      19 Jun 2025 00:25  Phos  4.9     06-19  Mg     1.80     06-19    TPro  4.7[L]  /  Alb  1.9[L]  /  TBili  0.4  /  DBili  x   /  AST  172[H]  /  ALT  48[H]  /  AlkPhos  120  06-19    PT/INR - ( 19 Jun 2025 00:25 )   PT: 14.1 sec;   INR: 1.19 ratio         PTT - ( 19 Jun 2025 00:25 )  PTT:34.6 sec  Urinalysis Basic - ( 19 Jun 2025 00:25 )    Color: x / Appearance: x / SG: x / pH: x  Gluc: 153 mg/dL / Ketone: x  / Bili: x / Urobili: x   Blood: x / Protein: x / Nitrite: x   Leuk Esterase: x / RBC: x / WBC x   Sq Epi: x / Non Sq Epi: x / Bacteria: x        RADIOLOGY & ADDITIONAL TESTS: Reviewed.

## 2025-06-19 NOTE — ED PROCEDURE NOTE - CPROC ED INFORMED CONSENT1
Benefits, risks, and possible complications of procedure explained to patient/caregiver who verbalized understanding and gave verbal consent.
NICU
19-Jun-2025 13:55

## 2025-06-19 NOTE — CHART NOTE - NSCHARTNOTEFT_GEN_A_CORE
Arterial Line Removal Note    PROCEDURE NOTE:  Arterial Line Removal    Site:    R Arterial Line    Explained the procedure. Dressing taken down, sutures removed, catheter removed and tip intact. Pressure applied for greater than 15 mins, no hematoma or bleeding noted. Patient tolerated procedure well. A dry sterile dressing applied.     Rossi Maravilla PA-C
DEATH NOTE    Called to bedside to evaluate the patient for asystole.     On physical exam, patient did not respond to verbal or noxious stimuli.  No spontaneous respirations.  Absent heart and breath sounds.  Absent radial and carotid pulses.   Pupils are fixed and dilated, no corneal reflex.  EKG rhythm strip shows asystole.   Patient pronounced dead at 2130 on 6/19/2025.  Attending Dr. Mancilla notified.
NUTRITION BRIEF CHART NOTE    Nutrition consult received for Assessment (placed 6/17 @ 16:55). Patient seen prior to consult order for a complete initial RD assessment on the same date (6/17), please refer to this note for further information.    Of note, per chart review, SLP evaluation (6/17) recommended "Continue NPO with consideration for short-term, non-oral means of nutrition/hydration/medication administration."     Per physician documentation (6/18), "Family deferred NGT placement at this time, reports discussing NGT with oncologist and based on their conversation would like to hold off for now. Patient will require adequate nutrition/hydration, plan to reassess and repeat SLP as mental status improves."     Given the above information, no nutrition intervention available at this time. Recommend palliative, GOC discussions as it pertains to nutrition. Nutrition plan of care to be in alignment with patient/family goals of care.    RD remains available, please consult as needed       Minerva Peterson MS RDN CDN  On [employer] Teams (Preferred), Pager #[phone]
Pt initially arrived to ICU mentating and conversant on 0.11 of levophed. I was called back to bedside for concerns of left arm PIV extrav. Pt minimally responsive to sternal rub with SBP in 30s on BP cuff. Pt immediately placed on NRB 15L and R Tibial IO placed and levophed uptitrated to 0.5 w/ 1L LR bolus given. Pt mentation returned with rise of blood pressure to 100s systolic. R Axillary A-line placed for stricter BP monitoring and levophed downtitrating to 0.3. Weaned back to nasal cannula. Extravasation protocol to be followed for left arm PIV site. Consented for CVC as pressor requirements continue to escalate and RIJ CVC placed, refer to procedure note for further information. Pt and son updated frequently at bedside. Attending notified.     Levophed running through PIV in left forearm with signs of extravasation under ultrasound visualization. Extremity found to be red, mildly edematous, with some induration. Adhered to pressor infiltration protocol- discontinued use of IV, phentolamine reconstituted with 10cc normal saline and injected through IV site and in clockwise fashion around indurated area. Peripheral IV then removed. Nitropatse applied in 2 inch ribbon and sterile dressing applied or not applied 2/2 hypotension. Extremity closely examined area at infiltration site with good blanchability. Pulses intact. Patent flow on bedside US. Cap refill present distally , skin intact without blistering or breakdown. Affected area marked and will be monitored. Vascular checks ordered q 4. RIJ CVC placed for vascular access.

## 2025-06-20 LAB
CULTURE RESULTS: ABNORMAL
ORGANISM # SPEC MICROSCOPIC CNT: ABNORMAL
ORGANISM # SPEC MICROSCOPIC CNT: ABNORMAL
SPECIMEN SOURCE: SIGNIFICANT CHANGE UP

## 2025-06-21 LAB
CULTURE RESULTS: SIGNIFICANT CHANGE UP
SPECIMEN SOURCE: SIGNIFICANT CHANGE UP

## 2025-06-23 LAB — NON-GYNECOLOGICAL CYTOLOGY STUDY: SIGNIFICANT CHANGE UP

## 2025-06-24 LAB
CULTURE RESULTS: SIGNIFICANT CHANGE UP
CULTURE RESULTS: SIGNIFICANT CHANGE UP
SPECIMEN SOURCE: SIGNIFICANT CHANGE UP
SPECIMEN SOURCE: SIGNIFICANT CHANGE UP

## 2025-07-30 ENCOUNTER — APPOINTMENT (OUTPATIENT)
Dept: CARDIOLOGY | Facility: CLINIC | Age: 75
End: 2025-07-30